# Patient Record
Sex: FEMALE | Race: WHITE | NOT HISPANIC OR LATINO | Employment: FULL TIME
[De-identification: names, ages, dates, MRNs, and addresses within clinical notes are randomized per-mention and may not be internally consistent; named-entity substitution may affect disease eponyms.]

---

## 2017-06-05 ENCOUNTER — RECORDS - HEALTHEAST (OUTPATIENT)
Dept: ADMINISTRATIVE | Facility: OTHER | Age: 22
End: 2017-06-05

## 2017-06-06 ENCOUNTER — COMMUNICATION - HEALTHEAST (OUTPATIENT)
Dept: SCHEDULING | Facility: CLINIC | Age: 22
End: 2017-06-06

## 2017-06-06 DIAGNOSIS — G43.909 MIGRAINE: ICD-10-CM

## 2017-06-09 ENCOUNTER — AMBULATORY - HEALTHEAST (OUTPATIENT)
Dept: FAMILY MEDICINE | Facility: CLINIC | Age: 22
End: 2017-06-09

## 2017-06-09 ENCOUNTER — COMMUNICATION - HEALTHEAST (OUTPATIENT)
Dept: INTERNAL MEDICINE | Facility: CLINIC | Age: 22
End: 2017-06-09

## 2017-06-09 ENCOUNTER — AMBULATORY - HEALTHEAST (OUTPATIENT)
Dept: NURSING | Facility: CLINIC | Age: 22
End: 2017-06-09

## 2017-06-09 ENCOUNTER — AMBULATORY - HEALTHEAST (OUTPATIENT)
Dept: INTERNAL MEDICINE | Facility: CLINIC | Age: 22
End: 2017-06-09

## 2017-06-09 DIAGNOSIS — Z23 IMMUNIZATION DUE: ICD-10-CM

## 2017-09-25 ENCOUNTER — OFFICE VISIT - HEALTHEAST (OUTPATIENT)
Dept: FAMILY MEDICINE | Facility: CLINIC | Age: 22
End: 2017-09-25

## 2017-09-25 DIAGNOSIS — R51.9 HEADACHE: ICD-10-CM

## 2017-09-25 DIAGNOSIS — R53.83 FATIGUE: ICD-10-CM

## 2017-09-25 LAB — HBA1C MFR BLD: 5.1 % (ref 3.5–6.1)

## 2017-09-25 RX ORDER — SUMATRIPTAN 100 MG/1
TABLET, FILM COATED ORAL
Qty: 10 TABLET | Refills: 0 | Status: SHIPPED | OUTPATIENT
Start: 2017-09-25 | End: 2022-03-14

## 2017-09-28 ENCOUNTER — COMMUNICATION - HEALTHEAST (OUTPATIENT)
Dept: FAMILY MEDICINE | Facility: CLINIC | Age: 22
End: 2017-09-28

## 2017-09-28 ENCOUNTER — AMBULATORY - HEALTHEAST (OUTPATIENT)
Dept: FAMILY MEDICINE | Facility: CLINIC | Age: 22
End: 2017-09-28

## 2017-09-28 DIAGNOSIS — E55.9 VITAMIN D DEFICIENCY: ICD-10-CM

## 2017-09-28 DIAGNOSIS — R79.0 LOW FERRITIN: ICD-10-CM

## 2017-11-13 ENCOUNTER — OFFICE VISIT - HEALTHEAST (OUTPATIENT)
Dept: FAMILY MEDICINE | Facility: CLINIC | Age: 22
End: 2017-11-13

## 2017-11-13 DIAGNOSIS — J02.9 SORE THROAT: ICD-10-CM

## 2017-11-21 ENCOUNTER — RECORDS - HEALTHEAST (OUTPATIENT)
Dept: ADMINISTRATIVE | Facility: OTHER | Age: 22
End: 2017-11-21

## 2020-02-25 ENCOUNTER — COMMUNICATION - HEALTHEAST (OUTPATIENT)
Dept: SCHEDULING | Facility: CLINIC | Age: 25
End: 2020-02-25

## 2020-02-26 ENCOUNTER — OFFICE VISIT - HEALTHEAST (OUTPATIENT)
Dept: FAMILY MEDICINE | Facility: CLINIC | Age: 25
End: 2020-02-26

## 2020-02-26 DIAGNOSIS — M54.50 ACUTE RIGHT-SIDED LOW BACK PAIN WITHOUT SCIATICA: ICD-10-CM

## 2020-02-26 RX ORDER — TRAMADOL HYDROCHLORIDE 50 MG/1
50 TABLET ORAL EVERY 6 HOURS PRN
Qty: 30 TABLET | Refills: 0 | Status: SHIPPED | OUTPATIENT
Start: 2020-02-26 | End: 2022-03-14

## 2020-02-26 RX ORDER — CYCLOBENZAPRINE HCL 10 MG
10 TABLET ORAL 3 TIMES DAILY PRN
Qty: 30 TABLET | Refills: 0 | Status: SHIPPED | OUTPATIENT
Start: 2020-02-26 | End: 2022-03-14

## 2020-08-14 ENCOUNTER — AMBULATORY - HEALTHEAST (OUTPATIENT)
Dept: FAMILY MEDICINE | Facility: CLINIC | Age: 25
End: 2020-08-14

## 2020-08-14 ENCOUNTER — VIRTUAL VISIT (OUTPATIENT)
Dept: FAMILY MEDICINE | Facility: OTHER | Age: 25
End: 2020-08-14

## 2020-08-14 DIAGNOSIS — Z20.822 SUSPECTED COVID-19 VIRUS INFECTION: ICD-10-CM

## 2020-08-14 NOTE — PROGRESS NOTES
"Date: 2020 10:16:31  Clinician: Tiana Castro  Clinician NPI: [id number]  Patient: Jennifer Gr  Patient : 1995  Patient Address: [address] [place], [place] [address]  Patient Phone: [phone]  Visit Protocol: URI  Patient Summary:  Jennifer is a 25 year old ( : 1995 ) female who initiated a Visit for COVID-19 (Coronavirus) evaluation and screening. When asked the question \"Please sign me up to receive news, health information and promotions. \", Jennifer responded \"No\".    Jennifer states her symptoms started gradually 3-4 days ago.   Her symptoms consist of a headache, a sore throat, a cough, nasal congestion, myalgia, anosmia, and malaise. Jennifer also feels feverish.   Symptom details     Nasal secretions: The color of her mucus is clear.    Cough: Jennifer coughs a few times an hour and her cough is not more bothersome at night. Phlegm comes into her throat when she coughs. She does not believe her cough is caused by post-nasal drip. The color of the phlegm is clear.     Sore throat: Jennifer reports having mild throat pain (1-3 on a 10 point pain scale), does not have exudate on her tonsils, and can swallow liquids. She is not sure if the lymph nodes in her neck are enlarged. A rash has not appeared on the skin since the sore throat started.     Temperature: Her current temperature is 99.4 degrees Fahrenheit.     Headache: She states the headache is mild (1-3 on a 10 point pain scale).      Jennifer denies having ear pain, chills, nausea, teeth pain, ageusia, diarrhea, vomiting, rhinitis, facial pain or pressure, and wheezing. She also denies having a sinus infection within the past year, taking antibiotic medication in the past month, double sickening (worsening symptoms after initial improvement), and having recent facial or sinus surgery in the past 60 days. She is not experiencing dyspnea.   Precipitating events  Within the past week, Jennifer has not been exposed to someone with strep " throat. She has recently been exposed to someone with influenza. Jennifer has not been in close contact with any high risk individuals.   Pertinent COVID-19 (Coronavirus) information  In the past 14 days, Jennifer has not worked in a congregate living setting.   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 She lives with a healthcare worker.   Jennifer has had a close contact with a laboratory-confirmed COVID-19 patient within 14 days of symptom onset.   Since December 2019, Jennifer and has not had upper respiratory infection or influenza-like illness. Has not been diagnosed with lab-confirmed COVID-19 test   Pertinent medical history  Jennifer does not get yeast infections when she takes antibiotics.   Jennifer does not need a return to work/school note.   Weight: 165 lbs   Jennifer does not smoke or use smokeless tobacco.   She denies pregnancy and denies breastfeeding. She has menstruated in the past month.   Weight: 165 lbs    MEDICATIONS: No current medications, ALLERGIES: NKDA  Clinician Response:  Dear Jennifer,   Your symptoms show that you may have coronavirus (COVID-19). This illness can cause fever, cough and trouble breathing. Many people get a mild case and get better on their own. Some people can get very sick.  What should I do?  We would like to test you for this virus.   1. Please call [phone] to schedule your visit. Explain that you were referred by [facility] to have a COVID-19 test. Be ready to share your [facility] visit ID number.  The following will serve as your written order for this COVID Test, ordered by me, for the indication of suspected COVID [Z20.828]: The test will be ordered in [employer], our electronic health record, after you are scheduled. It  "will show as ordered and authorized by Toy Gastelum MD.  Order: COVID-19 (Coronavirus) PCR for SYMPTOMATIC testing from [facility].    2. When it's time for your COVID test:  Stay at least 6 feet away from others. (If someone will drive you to your test, stay in the backseat, as far away from the  as you can.)   Cover your mouth and nose with a mask, tissue or washcloth.  Go straight to the testing site. Don't make any stops on the way there or back.    3.Starting now: Stay home and away from others (self-isolate) until:   You've had no fever---and no medicine that reduces fever---for one full day (24 hours). And...  Your other symptoms have gotten better. For example, your cough or breathing has improved. And...  At least 10 days have passed since your symptoms started.    During this time, don't leave the house except for testing or medical care.   Stay in your own room, even for meals. Use your own bathroom if you can.   Stay away from others in your home. No hugging, kissing or shaking hands. No visitors.  Don't go to work, school or anywhere else.    Clean \"high touch\" surfaces often (doorknobs, counters, handles, etc.). Use a household cleaning spray or wipes. You'll find a full list of  on the EPA website: www.epa.gov/pesticide-registration/list-n-disinfectants-use-against-sars-cov-2.   Cover your mouth and nose with a mask, tissue or washcloth to avoid spreading germs.  Wash your hands and face often. Use soap and water.  Caregivers in these groups are at risk for severe illness due to COVID-19:  o People 65 years and older  o People who live in a nursing home or long-term care facility  o People with chronic disease (lung, heart, cancer, diabetes, kidney, liver, immunologic)  o People who have a weakened immune system, including those who:   Are in cancer treatment  Take medicine that weakens the immune system, such as corticosteroids  Had a bone marrow or organ transplant  Have an immune " deficiency  Have poorly controlled HIV or AIDS  Are obese (body mass index of 40 or higher)  Smoke regularly   o Caregivers should wear gloves while washing dishes, handling laundry and cleaning bedrooms and bathrooms.  o Use caution when washing and drying laundry: Don't shake dirty laundry, and use the warmest water setting that you can.  o For more tips, go to www.cdc.gov/coronavirus/2019-ncov/downloads/10Things.pdf.   4.Sign up for [employer]. We know it's scary to hear that you might have COVID-19. We want to track your symptoms to make sure you're okay over the next 2 weeks. Please look for an email from [employer]---this is a free, online program that we'll use to keep in touch. To sign up, follow the link in the email. Learn more at [email or web address]  How can I take care of myself?   Get lots of rest. Drink extra fluids(unless a doctor has told you not to).  Take Tylenol (acetaminophen) for fever or pain. If you have liver or kidney problems, ask your family doctor if it's okay to take Tylenol.   Adults can take either:    650 mg (two 325 mg pills) every 4 to 6 hours, or...  1,000 mg (two 500 mg pills) every 8 hours as needed.   Note: Don't take more than 3,000 mg in one day. Acetaminophen is found in many medicines (both prescribed and over-the-counter medicines). Read all labels to be sure you don't take too much.   For children, check the Tylenol bottle for the right dose. The dose is based on the child's age or weight.    If you have other health problems (like cancer, heart failure, an organ transplant or severe kidney disease):Call your specialty clinic if you don't feel better in the next 2 days.    Know when to call 911. Emergency warning signs include:   Trouble breathing or shortness of breath Pain or pressure in the chest that doesn't go away Feeling confused like you haven't felt before, or not being able to wake up Bluish-colored lips or face.  Where can I get more  information?   [facility] -- About COVID-19: [email or web address]  CDC -- What to Do If You're Sick: www.cdc.gov/coronavirus/2019-ncov/about/steps-when-sick.html  [facility] -- Ending Home Isolation: www.cdc.gov/coronavirus/2019-ncov/hcp/disposition-in-home-patients.html  [facility] -- Caring for Someone: www.cdc.gov/coronavirus/2019-ncov/if-you-are-sick/care-for-someone.html  [facility] -- Interim Guidance for Hospital Discharge to Home: www.[facility].mn./diseases/coronavirus/hcp/hospdischarge.pdf  [facility] clinical trials (COVID-19 research studies): clinicalaffairs.[facility]-clinical-trials  Below are the COVID-19 hotlines at the [place] Department of Health ([facility]). Interpreters are available.    For health questions: Call 892-855-5818 or 1-761.774.4226 (7 a.m. to 7 p.m.) For questions about schools and childcare: Call 978-702-0452 or 1-672.861.9089 (7 a.m. to 7 p.m.)    Diagnosis: Cough  Diagnosis ICD: R05

## 2020-08-16 ENCOUNTER — COMMUNICATION - HEALTHEAST (OUTPATIENT)
Dept: SCHEDULING | Facility: CLINIC | Age: 25
End: 2020-08-16

## 2021-02-22 ENCOUNTER — OFFICE VISIT - HEALTHEAST (OUTPATIENT)
Dept: FAMILY MEDICINE | Facility: CLINIC | Age: 26
End: 2021-02-22

## 2021-02-22 DIAGNOSIS — Z30.019 ENCOUNTER FOR INITIAL PRESCRIPTION OF CONTRACEPTIVES, UNSPECIFIED CONTRACEPTIVE: ICD-10-CM

## 2021-03-04 ENCOUNTER — OFFICE VISIT - HEALTHEAST (OUTPATIENT)
Dept: FAMILY MEDICINE | Facility: CLINIC | Age: 26
End: 2021-03-04

## 2021-03-04 DIAGNOSIS — Z30.430 ENCOUNTER FOR IUD INSERTION: ICD-10-CM

## 2021-03-04 LAB — HCG UR QL: NEGATIVE

## 2021-03-04 RX ORDER — COPPER 313.4 MG/1
1 INTRAUTERINE DEVICE INTRAUTERINE ONCE
Status: SHIPPED | COMMUNITY
Start: 2021-03-04 | End: 2022-03-14

## 2021-05-31 VITALS — BODY MASS INDEX: 24.5 KG/M2 | WEIGHT: 168.3 LBS

## 2021-05-31 VITALS — BODY MASS INDEX: 25.11 KG/M2 | WEIGHT: 172.5 LBS

## 2021-06-04 VITALS
BODY MASS INDEX: 22.48 KG/M2 | WEIGHT: 154.44 LBS | DIASTOLIC BLOOD PRESSURE: 72 MMHG | SYSTOLIC BLOOD PRESSURE: 116 MMHG | HEART RATE: 64 BPM

## 2021-06-05 VITALS
WEIGHT: 171.5 LBS | BODY MASS INDEX: 24.96 KG/M2 | OXYGEN SATURATION: 100 % | HEART RATE: 82 BPM | SYSTOLIC BLOOD PRESSURE: 100 MMHG | DIASTOLIC BLOOD PRESSURE: 78 MMHG

## 2021-06-05 VITALS
SYSTOLIC BLOOD PRESSURE: 120 MMHG | OXYGEN SATURATION: 98 % | HEART RATE: 61 BPM | DIASTOLIC BLOOD PRESSURE: 80 MMHG | WEIGHT: 171.38 LBS | BODY MASS INDEX: 24.94 KG/M2

## 2021-06-06 NOTE — PROGRESS NOTES
ASSESSMENT/PLAN:  Acute right-sided low back pain without sciatica  This is a 25-year-old female who injured her right side lower back from playing broom ball 4 days ago.  Suspect her injury is musculoskeletal.  I will give her Flexeril and tramadol for management of her pain.  I asked that she continue with ice, heat, and modified weightbearing activities.  I encourage stretching when able.  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  Follow-up as needed.  Patient verbalized understanding and agreed with the plan  -     cyclobenzaprine (FLEXERIL) 10 MG tablet; Take 1 tablet (10 mg total) by mouth 3 (three) times a day as needed for muscle spasms.  -     traMADoL (ULTRAM) 50 mg tablet; Take 1 tablet (50 mg total) by mouth every 6 (six) hours as needed for pain.      CHIEF COMPLAINT:  Chief Complaint   Patient presents with     Back Pain     x 4 days due injury playing broom ball (ice hockey), right middle back     HISTORY OF PRESENT ILLNESS:  Violet is a 25 y.o. female presenting to the clinic today for upper right back pain. The patient was playing broom ball 4 days ago when she fell a few times and injured herself. She then went to work after playing broom ball. While at work, she reached for something and felt her back spasm and her pain spiked to a 9 out of 10. The pain is located in the upper lumbar region on the right side. The pain radiates up and down the back whenever she stands or sits. Today, the pain is at a 3 out of a 10 whenever she lays down. However, the pain will increase to a 6 out of a 10 whenever she walks or stands.  She has been icing and heating her back every 2 hours. She denies any hematuria. She does not have any history of chronic back pain.       REVIEW OF SYSTEMS:   Constitutional: Negative.   HENT: Negative.   Eyes: Negative.   Respiratory: Negative.   Cardiovascular: Negative.   Gastrointestinal: Negative.    Endocrine: Negative.   Genitourinary: Negative.   Musculoskeletal: Negative.   Skin: Negative.   Allergic/Immunologic: Negative.   Neurological: Negative.   Hematological: Negative.   Psychiatric/Behavioral: Negative.   All other systems are negative.    PFSH:  She works as a  at a restaurant.     TOBACCO USE:  Social History     Tobacco Use   Smoking Status Never Smoker   Smokeless Tobacco Never Used       VITALS:  Vitals:    02/26/20 0906   BP: 116/72   Patient Site: Left Arm   Patient Position: Sitting   Cuff Size: Adult Regular   Pulse: 64   Weight: 154 lb 7 oz (70.1 kg)     Wt Readings from Last 3 Encounters:   02/26/20 154 lb 7 oz (70.1 kg)   11/13/17 172 lb 8 oz (78.2 kg)   09/25/17 168 lb 4.8 oz (76.3 kg)       PHYSICAL EXAM:  Constitutional: Patient is oriented to person, place, and time. Patient appears well-developed and well-nourished. No distress.   Head: Normocephalic and atraumatic.   Right Ear: External ear normal.   Left Ear: External ear normal.   Nose: Nose normal.   Back: There is tenderness to palpation of the right thoracic area. There is no tenderness to palpation of the spinous or paraspinous process. There is no tenderness to percussion of the costal vertebral angle. There is no bruising, rash or deformity upon exam.   Neurological: Patient is alert and oriented to person, place, and time. Patient has normal reflexes. No cranial nerve deficit. Coordination normal.   Skin: Skin is warm and dry. No rash noted. Patient is not diaphoretic. No erythema. No pallor.    ADDITIONAL HISTORY SUMMARIZED (2): None.  DECISION TO OBTAIN EXTRA INFORMATION (1): None.   RADIOLOGY TESTS (1): None.  LABS (1): None.  MEDICINE TESTS (1): None.  INDEPENDENT REVIEW (2 each): None.     Amaris BARRIOS, am scribing for and in the presence of, Dr. Pittman.    IDr. Pittman, personally performed the services described in this documentation, as scribed by Amaris Irene in my presence, and it is both accurate  and complete.    MEDICATIONS:  Current Outpatient Medications   Medication Sig Dispense Refill     cyclobenzaprine (FLEXERIL) 10 MG tablet Take 1 tablet (10 mg total) by mouth 3 (three) times a day as needed for muscle spasms. 30 tablet 0     SUMAtriptan (IMITREX) 100 MG tablet Take 1 tablet orally once at the onset of headache, may repeat 1 dose after 2 hours. 10 tablet 0     traMADoL (ULTRAM) 50 mg tablet Take 1 tablet (50 mg total) by mouth every 6 (six) hours as needed for pain. 30 tablet 0     No current facility-administered medications for this visit.        Total data points:0

## 2021-06-06 NOTE — TELEPHONE ENCOUNTER
RN Assessment/Reason for Call:   Okay to leave Detailed Message  Jennifer calling in, appt Keith cevallos Sunday injured her back.  Using  ice and heat, tylenol and Motrin; helped some until spasms.  Once went down back of rt leg. Spasms thru her legs.    RN Action/Disposition:  Protocol recommends see Dr in 3 days.  Appt 9am [facility] Dr Pittman 2/26/20  Call back if worse symptoms  Discussed home care measures.  Agrees to plan.     Sandi Dye RN    [facility] Triage/med refill  2/25/2020  5:19 PM        Reason for Disposition    [1] After 3 days (72 hours) AND [2] back pain not improving    Protocols used: BACK INJURY-A-AH

## 2021-06-09 ENCOUNTER — RECORDS - HEALTHEAST (OUTPATIENT)
Dept: LAB | Facility: CLINIC | Age: 26
End: 2021-06-09

## 2021-06-09 LAB
ERYTHROCYTE [DISTWIDTH] IN BLOOD BY AUTOMATED COUNT: 12.3 % (ref 11–14.5)
HCT VFR BLD AUTO: 39.3 % (ref 35–47)
HGB BLD-MCNC: 12.7 G/DL (ref 12–16)
MCH RBC QN AUTO: 30 PG (ref 27–34)
MCHC RBC AUTO-ENTMCNC: 32.3 G/DL (ref 32–36)
MCV RBC AUTO: 93 FL (ref 80–100)
PLATELET # BLD AUTO: 291 THOU/UL (ref 140–440)
PMV BLD AUTO: 12.8 FL (ref 8.5–12.5)
PROGEST SERPL-MCNC: 8.4 NG/ML
RBC # BLD AUTO: 4.24 MILL/UL (ref 3.8–5.4)
TSH SERPL DL<=0.005 MIU/L-ACNC: 1.96 UIU/ML (ref 0.3–5)
WBC: 9.3 THOU/UL (ref 4–11)

## 2021-06-10 LAB — ESTRADIOL SERPL-MCNC: 98 PG/ML

## 2021-06-10 NOTE — TELEPHONE ENCOUNTER
"Coronavirus (COVID-19) Notification    Caller Name (Patient, parent, daughter/sone, grandparent, etc)  Patient     Reason for call  Notify of Positive Coronavirus (COVID-19) lab results, assess symptoms,  review [facility] recommendations    Lab Result    Lab test:  2019-nCoV rRt-PCR or SARS-CoV-2 PCR    Oropharyngeal AND/OR nasopharyngeal swabs is POSITIVE for 2019-nCoV RNA/SARS-COV-2 PCR (COVID-19 virus)    RN Recommendations/Instructions per [facility] Coronavirus COVID-19 recommendations    Brief introduction script  Introduce self and then review script:  \"I am calling on behalf of [employer].  We were notified that your Coronavirus test (COVID-19) for was POSITIVE for the virus.  I have some information to relay to you but first I wanted to mention that the [place] Dept of Health will be contacting you shortly [it's possible MD already called Patient] to talk to you more about how you are feeling and other people you have had contact with who might now also have the virus.  Also, [facility] is Partnering with the [facility] for Covid-19 research, you may be contacted directly by research staff.\"    ssessment (Inquire about Patient's current symptoms)   Assessment   Current Symptoms at time of phone call: (if no symptoms, document No symptoms]  cough,  Sore throat,  Headache, congested   Symptom onset (if applicable)  8/11/20     If at time of call, Patients symptoms hare worsened, the Patient should contact 911 or have someone drive them to Emergency Dept promptly:      If Patient calling 911, inform 911 personal that you have tested positive for the Coronavirus (COVID-19).  Place mask on and await 911 to arrive.    If Emergency Dept, If possible, please have another adult drive you to the Emergency Dept but you need to wear mask when in contact with other people.      Review information with Patient    Your result was positive. This means you have COVID-19 (coronavirus).  We have " sent you a letter that reviews the information that I'll be reviewing with you now.    How can I protect others?    If you have symptoms: stay home and away from others (self-isolate) until:    You've had no fever--and no medicine that reduces fever--for 3 full days (72 hours). And      Your other symptoms have gotten better. For example, your cough or breathing has improved. And     At least 10 days have passed since your symptoms started.    If you don't have symptoms: Stay home and away from others (self-isolate) until at least 10 days have passed since your first positive COVID-19 test. (Date test collected).    During this time:    Stay in your own room, including for meals. Use your own bathroom if you can.    Stay away from others in your home. No hugging, kissing or shaking hands. No visitors.     Don't go to work, school or anywhere else.     Clean  high touch  surfaces often (doorknobs, counters, handles, etc.). Use a household cleaning spray or wipes. You'll find a full list on the EPA website at www.epa.gov/pesticide-registration/list-n-disinfectants-use-against-sars-cov-2.     Cover your mouth and nose with a mask, tissue or washcloth to avoid spreading germs.    Wash your hands and face often with soap and water.    Caregivers in these groups are at risk for severe illness due to COVID-19:  o People 65 years and older  o People who live in a nursing home or long-term care facility  o People with chronic disease (lung, heart, cancer, diabetes, kidney, liver, immunologic)  o People who have a weakened immune system, including those who:  - Are in cancer treatment  - Take medicine that weakens the immune system, such as corticosteroids  - Had a bone marrow or organ transplant  - Have an immune deficiency  - Have poorly controlled HIV or AIDS  - Are obese (body mass index of 40 or higher)  - Smoke regularly    Caregivers should wear gloves while washing dishes, handling laundry and cleaning bedrooms and  bathrooms.    Wash and dry laundry with special caution. Don't shake dirty laundry, and use the warmest water setting you can.    If you have a weakened immune system, ask your doctor about other actions you should take.    For more tips, go to www.cdc.gov/coronavirus/2019-ncov/downloads/10Things.pdf.    You should not go back to work until you meet the guidelines above for ending your home isolation. You should meet these along with any other guidelines that your employer has.    Employers: This document serves as formal notice of your employee's medical guidelines for going back to work. They must meet the above guidelines before going back to work in person.    How can I take care of myself?    1. Get lots of rest. Drink extra fluids (unless a doctor has told you not to).    2. Take Tylenol (acetaminophen) for fever or pain. If you have liver or kidney problems, ask your family doctor if it's okay to take Tylenol.     Take either:     650 mg (two 325 mg pills) every 4 to 6 hours, or     1,000 mg (two 500 mg pills) every 8 hours as needed.     Note: Don't take more than 3,000 mg in one day. Acetaminophen is found in many medicines (both prescribed and over-the-counter medicines). Read all labels to be sure you don't take too much.    For children, check the Tylenol bottle for the right dose (based on their age or weight).    3. If you have other health problems (like cancer, heart failure, an organ transplant or severe kidney disease): Call your specialty clinic if you don't feel better in the next 2 days.    4. Know when to call 911: Emergency warning signs include:    Trouble breathing or shortness of breath    Pain or pressure in the chest that doesn't go away    Feeling confused like you haven't felt before, or not being able to wake up    Bluish-colored lips or face    5. Sign up for [employer]. We know it's scary to hear that you have COVID-19. We want to track your symptoms to make sure you're okay over  the next 2 weeks. Please look for an email from [employer]--this is a free, online program that we'll use to keep in touch. To sign up, follow the link in the email. Learn more at [email or web address].    Where can I get more information?    [facility]: [email or web address].[facility].[email or web address]/    Coronavirus Basics: www.health.[facility].[place]./diseases/coronavirus/basics.html    What to Do If You're Sick: www.cdc.gov/coronavirus/2019-ncov/about/steps-when-sick.html    Ending Home Isolation: www.cdc.gov/coronavirus/2019-ncov/hcp/disposition-in-home-patients.html     Caring for Someone with COVID-19: www.cdc.gov/coronavirus/2019-ncov/if-you-are-sick/care-for-someone.html     [facility] clinical trials (COVID-19 research studies): clinicalaffairs.[facility]-clinical-trials     A Positive COVID-19 letter will be sent via [employer] or the Mail    [Name]  Mark Mccauley RN  [facility] - [facility]  COVID19 Results Team RN  Ph# [phone]

## 2021-06-11 NOTE — PROGRESS NOTES
Pt has an appt with a nurse only today at [facility] asking for varicella shot, appt's note states no immune to varicella. Will call pt to bring result in.   Prior giving live vaccines. Pt needs pregnancy test. No birth control methods on file   Please advise/ place order for pregnancy test and  Varicella vaccine ASAP.       Baylee Oconnor, [facility] 6/9/2017 11:30 AM

## 2021-06-11 NOTE — PROGRESS NOTES
Chief Complaint   Patient presents with     Immunizations     Pt brought in varicella titer result.  Dr. Pena reviewed and states ok to give varicella injection today. Pregnancy test was negative   Pt states will get  2nd varicella in 4 weeks , per SHAYAN Latham in [place].  Varicella titer result is sent to med rec to scan.     Baylee Oconnor, [facility] 6/9/2017 2:53 PM

## 2021-06-13 NOTE — PROGRESS NOTES
Assessment/Plan:        Diagnoses and all orders for this visit:    Headache  -     Ambulatory referral to Neurology    Fatigue  -     Vitamin D, Total (25-Hydroxy)  -     Thyroid Stimulating Hormone (TSH)  -     Ferritin  -     HM2(CBC w/o Differential)  -     Comprehensive Metabolic Panel  -     Glycosylated Hemoglobin A1c    Other orders  -     SUMAtriptan (IMITREX) 100 MG tablet; Take 1 tablet orally once at the onset of headache, may repeat 1 dose after 2 hours.  Dispense: 10 tablet; Refill: 0        She is currently on 50 mg of Imitrex.  We will have her increase to 100 mg, not more than 200 mg total per day.  With her persistent and daily headache, numbness, will refer to [facility].  She is also to keep a headache diary to see if there is any particular pattern or triggers for her headaches.  For her fatigue, multifactorial.  We will do labs.  Await results prior to further recommendations.  For her menstrual cycle, closely monitor.  Could be affected by stressors or increase responsibilities, balancing work, life and school.  Recheck if worse.  She was agreeable with the plans.  Subjective:    Patient ID: Jennifer Gr is a 22 y.o. female.    TONY Rodriguez is here for follow-up from her last visit.  Has been having headaches persistently.  It is on a daily basis which is 2-5/10.  She has around 2 episodes in a month where it would be severe, 7-8/10.  Associated with dizziness, nausea, vomiting and numbness on the right lateral lip.  She takes Imitrex around 3 times a month.  If she is able to combine Imitrex with Naprosyn, Benadryl and sleep that it breaks the cycle.  Has had episodes where she had to repeat the Imitrex 2 hours after but felt miserable throughout the day.  She was started on Topamax but noted numbness in her hands and feet.  She also seems to be forgetting words.  She took it for 6-7 weeks and after that stopped it.  The numbness have improved.  Sometimes her daily headaches  would be worse mid day.  Tolerable during the morning.  Denies any syncopal events, vision changes.  She wears prescription glasses and the last eye exam was 6 months ago and was good.  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  She is able to function on a daily basis.    Wonders if her cycles are adding to her headache.  She would have her period every 26-37 days.  A few instances where she would have some brown spotting for a few days.  Sometimes her cycles are lighter.  She also feels tired for the past couple of months.  Wonders if this is work stress related.  Occasional lack of sleep.  There are times where she gets 8 hours of sleep but still feels tired even with less stressors or responsibilities for the day.  She is balancing work, school.  More requirements and need for focus.  She does function well with moderate amount of stress.    Review of Systems  As above otherwise negative.          Objective:    Physical Exam  /70 (Patient Site: Left Arm, Patient Position: Sitting, Cuff Size: Adult Large)  Pulse 65  Wt 168 lb 4.8 oz (76.3 kg)  LMP 09/07/2017 (Exact Date)  SpO2 98%  Breastfeeding? No  BMI 24.5 kg/m2    Vital signs noted above. AAO ×3.  HEENT no nasal discharge, moist oral mucosa.  Pupils 2-3 mm equally reactive to light.  Ears:TMs intact, no fluid collection. Neck: Supple neck, nonpalpable cervical lymph nodes.  Lungs: Clear to auscultation bilateral.  Heart: S1-S2 regular rate and rhythm, no murmurs were noted.  Abdomen:  with bowel sounds.

## 2021-06-14 NOTE — PROGRESS NOTES
Chief complaint: Sore throat and viral-like illness    HPI: The patient reports that Saturday, 3 days ago, she had body aches and fever.  She has been taking Tylenol and ibuprofen fairly regularly since that time has a fever will often precipitate a migraine.  She lives in a house with small children who have been ill.  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  She is also caring for a lot of classes at [employer] as she is studying public health.    She been ill with body aches over the weekend and yesterday was a little bit of a sore throat but today when she woke up the sore throat was worse and it seemed more swollen on the left than on the right.  She has not had vomiting or diarrhea or cough    Objective:/82 (Patient Site: Right Arm, Patient Position: Sitting, Cuff Size: Adult Regular)  Pulse 72  Wt 172 lb 8 oz (78.2 kg)  LMP 11/10/2017 (Approximate)  Breastfeeding? No  BMI 25.11 kg/m2  She is in no acute distress.  She is wearing glasses.  Her conjunctiva are clear and her TMs are pearly gray.  She does not have facial tenderness.  She has a little bit of erythema of her tonsils and the left one does appear to be slightly larger than the right and she has some mucus in the tonsillar crypts.  She has some anterior cervical adenopathy but it is not tender.  Lungs are clear to auscultation.  Recent Results (from the past 24 hour(s))   Rapid Strep A Screen-Throat   Result Value Ref Range    Rapid Strep A Antigen No Group A Strep detected, presumptive negative No Group A Strep detected, presumptive negative     Assessment: Viral illness with sore throat    Plan: It is possible that the strep throat culture will be positive even though the rapid test was negative.  I suggested salt water gargles and continued ibuprofen.  If the backup culture is positive, she will fill the prescription that I gave her today and printed out, for  amoxicillin.  If later in the week, despite a negative strep, she still is feeling as though she has a very red throat and sore swollen tonsils, she will fill the antibiotic prescription to treat tonsillitis.  She is comfortable with these parameters and will follow-up as needed

## 2021-06-15 NOTE — PROGRESS NOTES
IUD Insertion Procedure Note    Pre-operative Diagnosis: Contraception, paragard IUD Insertion    Post-operative Diagnosis: same    Indications: contraception    Procedure Details   Urine pregnancy test was done and result was negative.  The risks (including infection, bleeding, pain, and uterine perforation) and benefits of the procedure were explained to the patient and Written informed consent was obtained.      Cervix cleansed with Betadine. Uterus sounded to 8 cm. Paragard IUD inserted without difficulty. String visible and trimmed. Patient tolerated procedure well.    Condition:  Stable    Complications:  None    Plan:  The patient was advised to call for any fever or for prolonged or severe pain or bleeding. She was advised to use OTC analgesics as needed for mild to moderate pain.  She was advised to feel for the strings in 2 weeks or come in if she is unable to feel the strings.

## 2021-06-15 NOTE — PROGRESS NOTES
ASSESSMENT/PLAN:  Violet was seen today for iud consult.    Diagnoses and all orders for this visit:    Encounter for initial prescription of contraceptives, unspecified contraceptive  Spoke about various forms of birth control.  Also spoke about hormonal versus nonhormonal form of birth control.  Patient verbalized interest in the paragard IUD.  We spoke about the procedure, risks and benefit, and timing of insertion.  She will come back during her period week for insertion of paragard IUD.  Patient verbalized understanding and agree with plan    SUBJECTIVE:    Jennifer Gr is a 26 y.o. female who came in today     Interested in paragard IUD  Getting  end of June 2021  Was on progesterone only pills; got worsening HA  Menstrual cycles are 25-37 days interval  No heavy menstrual bleeding  Never been sexually active    Review of Systems (except those mentioned above)  Constitutional: Negative.   HENT: Negative.   Eyes: Negative.   Respiratory: Negative.   Cardiovascular: Negative.   Gastrointestinal: Negative.   Endocrine: Negative.   Genitourinary: Negative.   Musculoskeletal: Negative.   Skin: Negative.   Allergic/Immunologic: Negative.   Neurological: Negative.   Hematological: Negative.   Psychiatric/Behavioral: Negative.     There are no active problems to display for this patient.    No Known Allergies  Current Outpatient Medications   Medication Sig Dispense Refill     cyclobenzaprine (FLEXERIL) 10 MG tablet Take 1 tablet (10 mg total) by mouth 3 (three) times a day as needed for muscle spasms. 30 tablet 0     SUMAtriptan (IMITREX) 100 MG tablet Take 1 tablet orally once at the onset of headache, may repeat 1 dose after 2 hours. 10 tablet 0     traMADoL (ULTRAM) 50 mg tablet Take 1 tablet (50 mg total) by mouth every 6 (six) hours as needed for pain. 30 tablet 0     No current facility-administered medications for this visit.      No past medical history on file.  No past surgical history on  file.  Social History     Socioeconomic History     Marital status: Single     Spouse name: None     Number of children: None     Years of education: None     Highest education level: None   Occupational History     None   Social Needs     Financial resource strain: None     Food insecurity     Worry: None     Inability: None     Transportation needs     Medical: None     Non-medical: None   Tobacco Use     Smoking status: Never Smoker     Smokeless tobacco: Never Used   Substance and Sexual Activity     Alcohol use: None     Drug use: None     Sexual activity: None   Lifestyle     Physical activity     Days per week: None     Minutes per session: None     Stress: None   Relationships     Social connections     Talks on phone: None     Gets together: None     Attends [employer] service: None     Active member of club or organization: None     Attends meetings of clubs or organizations: None     Relationship status: None     Intimate partner violence     Fear of current or ex partner: None     Emotionally abused: None     Physically abused: None     Forced sexual activity: None   Other Topics Concern     None   Social History Narrative     None     No family history on file.      OBJECTIVE:    Vitals:    02/22/21 1036   BP: 120/80   Patient Site: Left Arm   Patient Position: Sitting   Cuff Size: Adult Regular   Pulse: 61   SpO2: 98%   Weight: 171 lb 6 oz (77.7 kg)     Body mass index is 24.94 kg/m .    Physical Exam:  Constitutional: Patient is oriented to person, place, and time. Patient appears well-developed and well-nourished. No distress.   Head: Normocephalic and atraumatic.   Right Ear: External ear normal.   Left Ear: External ear normal.   Eyes: Conjunctivae and EOM are normal. Right eye exhibits no discharge. Left eye exhibits no discharge. No scleral icterus.   Neurological: Patient is alert and oriented to person, place, and time. No cranial nerve deficit. Coordination normal.   Skin: No rash noted.  Patient is not diaphoretic. No erythema. No pallor.

## 2021-06-20 ENCOUNTER — HEALTH MAINTENANCE LETTER (OUTPATIENT)
Age: 26
End: 2021-06-20

## 2021-06-20 NOTE — LETTER
Letter by Oliver Taylor MD at      Author: Oliver Taylor MD Service: -- Author Type: --    Filed:  Encounter Date: 2/26/2020 Status: (Other)         February 26, 2020     Patient: Jennifer Gr   YOB: 1995   Date of Visit: 2/26/2020       To Whom it May Concern:    Jennifer Gr was seen in my clinic on 2/26/2020.  She injured her back and is experiencing significant pain and limitation in range of motion.  I recommend that she refrains from work effective today through March 2, 2020.  I recommend that she limits her weight bearing activities and lifting in the meantime.  She may return to work on March 3, 2020 with light duties for the first week.  Light duties include no lifting more than 5 pounds and to change position after prolonged standing or sitting for more than 60 minutes.    If you have any questions or concerns, please don't hesitate to call.    Sincerely,         Electronically signed by Oliver Alejo MD

## 2021-10-04 ENCOUNTER — LAB REQUISITION (OUTPATIENT)
Dept: LAB | Facility: CLINIC | Age: 26
End: 2021-10-04

## 2021-10-04 DIAGNOSIS — Z01.84 ENCOUNTER FOR ANTIBODY RESPONSE EXAMINATION: ICD-10-CM

## 2021-10-04 PROCEDURE — 86769 SARS-COV-2 COVID-19 ANTIBODY: CPT | Mod: ORL | Performed by: MIDWIFE

## 2021-10-06 LAB
SARS-COV-2 AB SERPL IA-ACNC: >250 U/ML
SARS-COV-2 AB SERPL-IMP: POSITIVE

## 2021-10-11 ENCOUNTER — HEALTH MAINTENANCE LETTER (OUTPATIENT)
Age: 26
End: 2021-10-11

## 2021-11-18 ENCOUNTER — LAB REQUISITION (OUTPATIENT)
Dept: LAB | Facility: CLINIC | Age: 26
End: 2021-11-18

## 2021-11-18 PROCEDURE — 86769 SARS-COV-2 COVID-19 ANTIBODY: CPT | Mod: ORL | Performed by: MIDWIFE

## 2021-11-22 LAB
SARS-COV-2 AB SERPL IA-ACNC: >250 U/ML
SARS-COV-2 AB SERPL-IMP: POSITIVE

## 2021-12-08 ENCOUNTER — OFFICE VISIT (OUTPATIENT)
Dept: URGENT CARE | Facility: URGENT CARE | Age: 26
End: 2021-12-08

## 2021-12-08 VITALS
HEART RATE: 104 BPM | DIASTOLIC BLOOD PRESSURE: 89 MMHG | OXYGEN SATURATION: 99 % | WEIGHT: 171 LBS | SYSTOLIC BLOOD PRESSURE: 138 MMHG | TEMPERATURE: 98.7 F | BODY MASS INDEX: 24.89 KG/M2

## 2021-12-08 DIAGNOSIS — M62.830 BACK MUSCLE SPASM: Primary | ICD-10-CM

## 2021-12-08 PROCEDURE — 99213 OFFICE O/P EST LOW 20 MIN: CPT | Performed by: NURSE PRACTITIONER

## 2021-12-08 RX ORDER — METHOCARBAMOL 750 MG/1
750 TABLET, FILM COATED ORAL 3 TIMES DAILY
Qty: 21 TABLET | Refills: 0 | Status: SHIPPED | OUTPATIENT
Start: 2021-12-08 | End: 2021-12-15

## 2021-12-08 NOTE — PROGRESS NOTES
Chief Complaint   Patient presents with     Urgent Care     Back Pain     c/o back pain after a fall yesterday     SUBJECTIVE:  Jennifer Gr is a 26 year old female who presents to the clinic today with left scapular back muscle spasm for 1 day. She slipped on ice yesterday and landed on her buttocks. Felt fine until this morning, was putting on jeans, and had severe pain, tightness, spasms. It hurts over her left scapula and somewhat towards her c spine. She declines numbness, tingling, weakness. Had had back injuries in the past, wonders if there is some disc narrowing, feels like this is more muscular. Tried ibuprofen.    No past medical history on file.  copper (PARAGARD T 380A) 380 square mm IUD IUD, [COPPER (PARAGARD T 380A) 380 SQUARE MM IUD IUD] 1 each by Intrauterine route once. Inserted by Dr. Jovita Ptitman on 03/04/2021  cyclobenzaprine (FLEXERIL) 10 MG tablet, [CYCLOBENZAPRINE (FLEXERIL) 10 MG TABLET] Take 1 tablet (10 mg total) by mouth 3 (three) times a day as needed for muscle spasms.  SUMAtriptan (IMITREX) 100 MG tablet, [SUMATRIPTAN (IMITREX) 100 MG TABLET] Take 1 tablet orally once at the onset of headache, may repeat 1 dose after 2 hours.  traMADoL (ULTRAM) 50 mg tablet, [TRAMADOL (ULTRAM) 50 MG TABLET] Take 1 tablet (50 mg total) by mouth every 6 (six) hours as needed for pain.    No current facility-administered medications on file prior to visit.    Social History     Tobacco Use     Smoking status: Never Smoker     Smokeless tobacco: Never Used   Substance Use Topics     Alcohol use: Not on file     No Known Allergies    Review of Systems   All systems negative except for those listed above in HPI.    EXAM:   /89   Pulse 104   Temp 98.7  F (37.1  C) (Tympanic)   Wt 77.6 kg (171 lb)   LMP 11/24/2021   SpO2 99%   BMI 24.89 kg/m      Physical Exam  Vitals reviewed.   Constitutional:       General: She is in acute distress (tearful).      Appearance: Normal appearance.   HENT:       Head: Normocephalic and atraumatic.   Cardiovascular:      Rate and Rhythm: Normal rate.   Pulmonary:      Effort: Pulmonary effort is normal.   Musculoskeletal:         General: Tenderness and signs of injury present. No swelling or deformity. Normal range of motion.      Right lower leg: No edema.      Left lower leg: No edema.   Skin:     General: Skin is warm and dry.      Findings: No rash.   Neurological:      General: No focal deficit present.      Mental Status: She is alert and oriented to person, place, and time.   Psychiatric:         Mood and Affect: Mood normal.         Behavior: Behavior normal.       ASSESSMENT:    ICD-10-CM    1. Back muscle spasm  M62.830 methocarbamol (ROBAXIN) 750 MG tablet     PLAN:    Likely cervical muscle strain with spasm  Robaxin  Rotate tylenol and ibuprofen  Heat, massage, stretching, topicals  Shared decision making to hold on imaging at this time as she did not fall onto her c spine and is neurovascularly intact    Patient Instructions     Patient Education     Back Spasm (No Trauma)    Spasm of the back muscles can occur after a sudden forceful twisting or bending such as in a car accident. A spasm can also happen after a simple awkward movement, or after lifting something heavy with poor body positioning. In any case, muscle spasm adds to the pain. Sleeping in an awkward position or on a poor quality mattress can also cause this. Some people respond to emotional stress by tensing the muscles of their back.  Pain that continues may need further assessment or other types of treatment such as physical therapy.  You don't always need X-rays for the first assessment of back pain, unless you had a physical injury such as from a car accident or fall. If your pain continues and doesn't respond to medical treatment, X-rays and other tests may then be done.   Home care    As soon as possible, start sitting or walking again. This will help prevent problems from a long bed rest.  These problems include muscle weakness, worsening back stiffness and pain, and blood clots in the legs.    When in bed, try to find a position of comfort. A firm mattress is best. Try lying flat on your back with pillows under your knees. You can also try lying on your side with your knees bent up toward your chest and a pillow between your knees.    Don't sit for long periods. Also limit car rides and travel. This puts more stress on the lower back than standing or walking.     During the first 24 to 72 hours after an injury or flare-up, put an ice pack on the painful area for 20 minutes, then remove it for 20 minutes. Do this over a period of 60 to 90 minutes, or several times a day. This will reduce swelling and pain. Always wrap ice packs in a thin towel.    You can start with ice, then switch to heat. Heat from a hot shower, hot bath, or heating pad reduces pain and works well for muscle spasms. Put heat on the painful area for 20 minutes, then remove it for 20 minutes. Do this over a period of 60 to 90 minutes, or several times a day. Don't sleep on a heating pad. It can burn or damage skin.    Alternate using ice and heat.    Be aware of safe lifting methods. don't lift anything over 15 pounds until all the pain is gone.  Gentle stretching will help your back heal faster. Do this simple routine 2 to 3 times a day until your back is feeling better.    Lie on your back with your knees bent and both feet on the ground.    Slowly raise your left knee to your chest as you flatten your lower back against the floor. Hold for 20 to 30 seconds.    Relax and repeat the exercise with your right knee.    Do 2 to 3 of these exercises for each leg.    Repeat, hugging both knees to your chest at the same time.    Don't bounce, but use a gentle pull.  Medicines  Talk with your doctor before using medicine, especially if you have other medical problems or are taking other medicines.  You may use over-the-counter medicines  such as acetaminophen, ibuprofen, or naprosyn to control pain, unless your healthcare provider prescribed another pain medicine. Talk with your healthcare provider if you have a chronic condition such as diabetes, liver or kidney disease, stomach ulcer, or digestive bleeding, or are taking blood thinners.  Be careful if you are given prescription pain medicine, opioids, or medicine for muscle spasm. They can cause drowsiness, and affect your coordination, reflexes, and judgment. Don't drive or operate heavy machinery when taking these medicines. Take pain medicine only as prescribed by your healthcare provider.  Follow-up care  Follow up with your doctor, or as advised. You may need physical therapy or more tests.  If X-rays were taken, they may be reviewed by a radiologist. You will be told of any new findings that may affect your care.  Call   Call if any of these occur:    Trouble breathing    Confusion    Drowsiness or trouble awakening    Fainting or loss of consciousness    Rapid or very slow heart rate    Loss of bowel or bladder control  When to seek medical advice  Call your healthcare provider right away if any of these occur:    Pain becomes worse or spreads to your legs    Weakness or numbness in one or both legs    Numbness in the groin or genital area    Fever of 100.4 F (38 C) or higher , or as directed by your healthcare provider    Chills    Burning or pain when passing urine  [employer] last reviewed this educational content on 11/1/2018 2000-2021 [employer]. All rights reserved. This information is not intended as a substitute for professional medical care. Always follow your healthcare professional's instructions.             Follow up with primary care provider with any problems, questions or concerns or if symptoms worsen or fail to improve. Patient agreed to plan and verbalized understanding.    [facility]

## 2021-12-08 NOTE — PATIENT INSTRUCTIONS
Patient Education     Back Spasm (No Trauma)    Spasm of the back muscles can occur after a sudden forceful twisting or bending such as in a car accident. A spasm can also happen after a simple awkward movement, or after lifting something heavy with poor body positioning. In any case, muscle spasm adds to the pain. Sleeping in an awkward position or on a poor quality mattress can also cause this. Some people respond to emotional stress by tensing the muscles of their back.  Pain that continues may need further assessment or other types of treatment such as physical therapy.  You don't always need X-rays for the first assessment of back pain, unless you had a physical injury such as from a car accident or fall. If your pain continues and doesn't respond to medical treatment, X-rays and other tests may then be done.   Home care    As soon as possible, start sitting or walking again. This will help prevent problems from a long bed rest. These problems include muscle weakness, worsening back stiffness and pain, and blood clots in the legs.    When in bed, try to find a position of comfort. A firm mattress is best. Try lying flat on your back with pillows under your knees. You can also try lying on your side with your knees bent up toward your chest and a pillow between your knees.    Don't sit for long periods. Also limit car rides and travel. This puts more stress on the lower back than standing or walking.     During the first 24 to 72 hours after an injury or flare-up, put an ice pack on the painful area for 20 minutes, then remove it for 20 minutes. Do this over a period of 60 to 90 minutes, or several times a day. This will reduce swelling and pain. Always wrap ice packs in a thin towel.    You can start with ice, then switch to heat. Heat from a hot shower, hot bath, or heating pad reduces pain and works well for muscle spasms. Put heat on the painful area for 20 minutes, then remove it for 20 minutes. Do this  over a period of 60 to 90 minutes, or several times a day. Don't sleep on a heating pad. It can burn or damage skin.    Alternate using ice and heat.    Be aware of safe lifting methods. don't lift anything over 15 pounds until all the pain is gone.  Gentle stretching will help your back heal faster. Do this simple routine 2 to 3 times a day until your back is feeling better.    Lie on your back with your knees bent and both feet on the ground.    Slowly raise your left knee to your chest as you flatten your lower back against the floor. Hold for 20 to 30 seconds.    Relax and repeat the exercise with your right knee.    Do 2 to 3 of these exercises for each leg.    Repeat, hugging both knees to your chest at the same time.    Don't bounce, but use a gentle pull.  Medicines  Talk with your doctor before using medicine, especially if you have other medical problems or are taking other medicines.  You may use over-the-counter medicines such as acetaminophen, ibuprofen, or naprosyn to control pain, unless your healthcare provider prescribed another pain medicine. Talk with your healthcare provider if you have a chronic condition such as diabetes, liver or kidney disease, stomach ulcer, or digestive bleeding, or are taking blood thinners.  Be careful if you are given prescription pain medicine, opioids, or medicine for muscle spasm. They can cause drowsiness, and affect your coordination, reflexes, and judgment. Don't drive or operate heavy machinery when taking these medicines. Take pain medicine only as prescribed by your healthcare provider.  Follow-up care  Follow up with your doctor, or as advised. You may need physical therapy or more tests.  If X-rays were taken, they may be reviewed by a radiologist. You will be told of any new findings that may affect your care.  Call   Call if any of these occur:    Trouble breathing    Confusion    Drowsiness or trouble awakening    Fainting or loss of consciousness    Rapid  or very slow heart rate    Loss of bowel or bladder control  When to seek medical advice  Call your healthcare provider right away if any of these occur:    Pain becomes worse or spreads to your legs    Weakness or numbness in one or both legs    Numbness in the groin or genital area    Fever of 100.4 F (38 C) or higher , or as directed by your healthcare provider    Chills    Burning or pain when passing urine  [employer] last reviewed this educational content on 11/1/2018 2000-2021 [employer]. All rights reserved. This information is not intended as a substitute for professional medical care. Always follow your healthcare professional's instructions.

## 2022-03-14 ENCOUNTER — OFFICE VISIT (OUTPATIENT)
Dept: FAMILY MEDICINE | Facility: CLINIC | Age: 27
End: 2022-03-14

## 2022-03-14 VITALS
BODY MASS INDEX: 29.51 KG/M2 | OXYGEN SATURATION: 98 % | HEART RATE: 81 BPM | WEIGHT: 199.25 LBS | SYSTOLIC BLOOD PRESSURE: 123 MMHG | TEMPERATURE: 98.3 F | DIASTOLIC BLOOD PRESSURE: 84 MMHG | HEIGHT: 69 IN

## 2022-03-14 DIAGNOSIS — M54.41 ACUTE BILATERAL LOW BACK PAIN WITH RIGHT-SIDED SCIATICA: Primary | ICD-10-CM

## 2022-03-14 DIAGNOSIS — Z11.4 SCREENING FOR HIV (HUMAN IMMUNODEFICIENCY VIRUS): ICD-10-CM

## 2022-03-14 DIAGNOSIS — Z11.59 NEED FOR HEPATITIS C SCREENING TEST: ICD-10-CM

## 2022-03-14 DIAGNOSIS — Z12.4 CERVICAL CANCER SCREENING: ICD-10-CM

## 2022-03-14 PROCEDURE — 99214 OFFICE O/P EST MOD 30 MIN: CPT | Performed by: FAMILY MEDICINE

## 2022-03-14 ASSESSMENT — ENCOUNTER SYMPTOMS: BACK PAIN: 1

## 2022-03-14 NOTE — PROGRESS NOTES
"Assessment & Plan    1. Acute bilateral low back pain with right-sided sciatica  This is a 28 yo female with acute on chronic low back pain - midline, but with right sided sciatica.  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  There are no neurologic red flags.  Xray of the lower spine is obtained and reviewed together.  I don't see any worrisome findings.  I would recommend PT and/or chiropractic intervention for active therapy/core strengthening.  Discussed warning signs/symptoms that should be evaluated more urgently.   - XR Lumbar Spine 2/3 Views; Future    2. Screening for HIV (human immunodeficiency virus)  3. Need for hepatitis C screening test  Reviewed HM - has \"health maintenance\" done at her place of employment    4. Cervical cancer screening  Reports normal pap smear at her place of employment - patient works as a midwife      Patient also notes a \"lump\" in left cheek - reports +TMJ.  Exam is unremarkable - reassured.      Return in about 4 weeks (around 4/11/2022) for if not getting better.    Chief Complaint   Patient presents with     Back Pain     lower left side-     Derm Problem      HPI  Lower back pain x months   Has \"spot\" where pain is x 1 month   Pain is not localized just to the lump  Lump isn't getting bigger  Hasn't seen someone for pain in past -   Is a midwife -   No injury to back  Slipped on ice a couple months ago - more upper back pain     Some pain on outside of right hip to side of legs -   Lump is on left leg     Had COVID August 2020  Still monitors antibodies - \"I'm really high\"    Patient adopted -   No known family history     Gained weight in last few months (30 pounds in last 10-11 months - got , happy, sitting still on the couch more) - now, starting to exercise a little more, snacking has been terrible;    Lump in left cheek x years -   No change   Has TMJ as well      Back Pain     History " of Present Illness       Back Pain:  She presents for follow up of back pain. Patient's back pain is a chronic problem.  Location of back pain:  Right lower back, left lower back and left buttock  Description of back pain: dull ache, sharp and shooting  Back pain spreads: right thigh    Since patient first noticed back pain, pain is: always present, but gets better and worse  Does back pain interfere with her job:  Yes      Reason for visit:  Low back pain with a cyst or bump low L side and L facial cheek  Symptom onset:  More than a month  Symptoms include:  Low pack una especially with bending and moving sitting to standing  Symptom intensity:  Moderate  Symptom progression:  Staying the same  Had these symptoms before:  No  What makes it worse:  Lots of bending of moving from sitting to standing  What makes it better:  Heat, NSAIDs, stretching, rest    She eats 4 or more servings of fruits and vegetables daily.She consumes 1 sweetened beverage(s) daily.She exercises with enough effort to increase her heart rate 9 or less minutes per day.  She exercises with enough effort to increase her heart rate 3 or less days per week.   She is taking medications regularly.       There is no problem list on file for this patient.       No past medical history on file.     No current outpatient medications on file.     No current facility-administered medications for this visit.        No past surgical history on file.     Social History     Socioeconomic History     Marital status:      Spouse name: Not on file     Number of children: Not on file     Years of education: Not on file     Highest education level: Not on file   Occupational History     Not on file   Tobacco Use     Smoking status: Never Smoker     Smokeless tobacco: Never Used   Substance and Sexual Activity     Alcohol use: Not on file     Drug use: Not on file     Sexual activity: Not on file   Other Topics Concern     Not on file   Social History  "Narrative     Not on file     Social Determinants of Health     Financial Resource Strain: Not on file   Food Insecurity: Not on file   Transportation Needs: Not on file   Physical Activity: Not on file   Stress: Not on file   Social Connections: Not on file   Intimate Partner Violence: Not on file   Housing Stability: Not on file        No family history on file.     Review of Systems   HENT:        Positive TMJ, L>R     Musculoskeletal: Positive for back pain.   Skin:        Lump in left cheek (unchanged x years per patient)_   All other systems reviewed and are negative.       /84   Pulse 81   Temp 98.3  F (36.8  C)   Ht 1.753 m (5' 9\")   Wt 90.4 kg (199 lb 4 oz)   LMP 03/02/2022   SpO2 98%   BMI 29.42 kg/m       Physical Exam  Constitutional:       General: She is not in acute distress.     Appearance: She is well-developed.   HENT:      Right Ear: Tympanic membrane and external ear normal.      Left Ear: Tympanic membrane and external ear normal.      Nose: Nose normal.      Mouth/Throat:      Pharynx: No oropharyngeal exudate.   Eyes:      General:         Right eye: No discharge.         Left eye: No discharge.      Conjunctiva/sclera: Conjunctivae normal.      Pupils: Pupils are equal, round, and reactive to light.   Neck:      Thyroid: No thyromegaly.      Trachea: No tracheal deviation.   Cardiovascular:      Rate and Rhythm: Normal rate and regular rhythm.      Pulses: Normal pulses.      Heart sounds: Normal heart sounds, S1 normal and S2 normal. No murmur heard.    No friction rub. No S3 or S4 sounds.   Pulmonary:      Effort: Pulmonary effort is normal. No respiratory distress.      Breath sounds: Normal breath sounds. No wheezing or rales.   Abdominal:      General: Bowel sounds are normal.      Palpations: Abdomen is soft. There is no mass.      Tenderness: There is no abdominal tenderness.   Musculoskeletal:         General: Normal range of motion.      Cervical back: Neck supple.      " Comments: Neg SLR   Back supple, spine straight   Lymphadenopathy:      Cervical: No cervical adenopathy.   Skin:     General: Skin is warm and dry.      Findings: No rash.   Neurological:      General: No focal deficit present.      Mental Status: She is alert and oriented to person, place, and time.      Cranial Nerves: No cranial nerve deficit.      Sensory: No sensory deficit.      Motor: No weakness or abnormal muscle tone.      Coordination: Coordination normal.      Gait: Gait normal.      Deep Tendon Reflexes: Reflexes are normal and symmetric. Reflexes normal.   Psychiatric:         Thought Content: Thought content normal.         Judgment: Judgment normal.          Results:  Results for orders placed or performed in visit on 03/14/22   XR Lumbar Spine 2/3 Views     Status: None    Narrative    EXAM: XR LUMBAR SPINE 2-3 VIEWS  LOCATION: [facility]  DATE/TIME: 3/14/2022 9:04 AM    INDICATION:  Acute bilateral low back pain with right-sided sciatica.  COMPARISON: None.  TECHNIQUE: CR Lumbar Spine.      Impression    IMPRESSION: There are 5 lumbar type vertebral bodies. Mild levocurvature centered at L3 with otherwise normal alignment. Normal vertebral body heights without compression fracture. Normal disc spaces. Normal posterior elements. The sacrum and visualized   pelvis are unremarkable.       Medications at Conclusion of Visit:  No current outpatient medications on file.         ANTHONY FLEMING MD

## 2022-04-04 ENCOUNTER — TRANSFERRED RECORDS (OUTPATIENT)
Dept: HEALTH INFORMATION MANAGEMENT | Facility: CLINIC | Age: 27
End: 2022-04-04

## 2022-04-04 ENCOUNTER — LAB REQUISITION (OUTPATIENT)
Dept: LAB | Facility: CLINIC | Age: 27
End: 2022-04-04
Payer: COMMERCIAL

## 2022-04-04 DIAGNOSIS — Z34.01 ENCOUNTER FOR SUPERVISION OF NORMAL FIRST PREGNANCY, FIRST TRIMESTER: ICD-10-CM

## 2022-04-04 DIAGNOSIS — Z36.0 ENCOUNTER FOR ANTENATAL SCREENING FOR CHROMOSOMAL ANOMALIES: ICD-10-CM

## 2022-04-04 LAB
HCG SERPL QL: POSITIVE
HCG SERPL-ACNC: 975 MLU/ML (ref 0–4)
PROGEST SERPL-MCNC: 15.5 NG/ML

## 2022-04-04 PROCEDURE — 84702 CHORIONIC GONADOTROPIN TEST: CPT | Performed by: MIDWIFE

## 2022-04-04 PROCEDURE — 36415 COLL VENOUS BLD VENIPUNCTURE: CPT | Performed by: MIDWIFE

## 2022-04-04 PROCEDURE — 84144 ASSAY OF PROGESTERONE: CPT | Performed by: MIDWIFE

## 2022-04-04 PROCEDURE — 84703 CHORIONIC GONADOTROPIN ASSAY: CPT | Mod: ORL | Performed by: MIDWIFE

## 2022-04-04 PROCEDURE — P9603 ONE-WAY ALLOW PRORATED MILES: HCPCS | Mod: ORL | Performed by: MIDWIFE

## 2022-04-04 PROCEDURE — P9603 ONE-WAY ALLOW PRORATED MILES: HCPCS | Performed by: MIDWIFE

## 2022-04-04 PROCEDURE — 84702 CHORIONIC GONADOTROPIN TEST: CPT | Mod: ORL | Performed by: MIDWIFE

## 2022-04-04 PROCEDURE — 84144 ASSAY OF PROGESTERONE: CPT | Mod: ORL | Performed by: MIDWIFE

## 2022-04-04 PROCEDURE — 84703 CHORIONIC GONADOTROPIN ASSAY: CPT | Performed by: MIDWIFE

## 2022-04-05 ENCOUNTER — TRANSFERRED RECORDS (OUTPATIENT)
Dept: HEALTH INFORMATION MANAGEMENT | Facility: CLINIC | Age: 27
End: 2022-04-05

## 2022-04-05 ENCOUNTER — LAB REQUISITION (OUTPATIENT)
Dept: LAB | Facility: CLINIC | Age: 27
End: 2022-04-05
Payer: COMMERCIAL

## 2022-04-05 DIAGNOSIS — Z36.0 ENCOUNTER FOR ANTENATAL SCREENING FOR CHROMOSOMAL ANOMALIES: ICD-10-CM

## 2022-04-05 DIAGNOSIS — Z34.01 ENCOUNTER FOR SUPERVISION OF NORMAL FIRST PREGNANCY, FIRST TRIMESTER: ICD-10-CM

## 2022-04-05 LAB
HCG SERPL QL: POSITIVE
HCG SERPL-ACNC: 1570 MLU/ML (ref 0–4)

## 2022-04-05 PROCEDURE — 84702 CHORIONIC GONADOTROPIN TEST: CPT | Performed by: MIDWIFE

## 2022-04-05 PROCEDURE — 84702 CHORIONIC GONADOTROPIN TEST: CPT | Mod: ORL | Performed by: MIDWIFE

## 2022-04-05 PROCEDURE — 84703 CHORIONIC GONADOTROPIN ASSAY: CPT | Performed by: MIDWIFE

## 2022-04-05 PROCEDURE — 84703 CHORIONIC GONADOTROPIN ASSAY: CPT | Mod: ORL | Performed by: MIDWIFE

## 2022-04-11 ENCOUNTER — LAB REQUISITION (OUTPATIENT)
Dept: LAB | Facility: CLINIC | Age: 27
End: 2022-04-11
Payer: COMMERCIAL

## 2022-04-11 ENCOUNTER — LAB REQUISITION (OUTPATIENT)
Dept: LAB | Facility: CLINIC | Age: 27
End: 2022-04-11

## 2022-04-11 ENCOUNTER — TRANSFERRED RECORDS (OUTPATIENT)
Dept: HEALTH INFORMATION MANAGEMENT | Facility: CLINIC | Age: 27
End: 2022-04-11

## 2022-04-11 DIAGNOSIS — Z34.01 ENCOUNTER FOR SUPERVISION OF NORMAL FIRST PREGNANCY, FIRST TRIMESTER: ICD-10-CM

## 2022-04-11 DIAGNOSIS — Z36.0 ENCOUNTER FOR ANTENATAL SCREENING FOR CHROMOSOMAL ANOMALIES: ICD-10-CM

## 2022-04-11 LAB
HCG SERPL QL: POSITIVE
HCG SERPL-ACNC: ABNORMAL MLU/ML (ref 0–4)
PROGEST SERPL-MCNC: 14.7 NG/ML

## 2022-04-11 PROCEDURE — 84702 CHORIONIC GONADOTROPIN TEST: CPT | Performed by: MIDWIFE

## 2022-04-11 PROCEDURE — 84144 ASSAY OF PROGESTERONE: CPT | Mod: ORL | Performed by: MIDWIFE

## 2022-04-11 PROCEDURE — 84703 CHORIONIC GONADOTROPIN ASSAY: CPT | Mod: ORL | Performed by: MIDWIFE

## 2022-04-11 PROCEDURE — 84144 ASSAY OF PROGESTERONE: CPT | Performed by: MIDWIFE

## 2022-04-11 PROCEDURE — 84703 CHORIONIC GONADOTROPIN ASSAY: CPT | Performed by: MIDWIFE

## 2022-04-11 PROCEDURE — 84702 CHORIONIC GONADOTROPIN TEST: CPT | Mod: ORL | Performed by: MIDWIFE

## 2022-04-15 ENCOUNTER — TRANSFERRED RECORDS (OUTPATIENT)
Dept: HEALTH INFORMATION MANAGEMENT | Facility: CLINIC | Age: 27
End: 2022-04-15

## 2022-04-18 ENCOUNTER — TRANSFERRED RECORDS (OUTPATIENT)
Dept: HEALTH INFORMATION MANAGEMENT | Facility: CLINIC | Age: 27
End: 2022-04-18

## 2022-04-18 PROCEDURE — 84144 ASSAY OF PROGESTERONE: CPT | Mod: ORL | Performed by: MIDWIFE

## 2022-04-18 PROCEDURE — 84702 CHORIONIC GONADOTROPIN TEST: CPT | Mod: ORL | Performed by: MIDWIFE

## 2022-04-18 PROCEDURE — 84702 CHORIONIC GONADOTROPIN TEST: CPT | Performed by: MIDWIFE

## 2022-04-18 PROCEDURE — 84144 ASSAY OF PROGESTERONE: CPT | Performed by: MIDWIFE

## 2022-04-19 ENCOUNTER — LAB REQUISITION (OUTPATIENT)
Dept: LAB | Facility: CLINIC | Age: 27
End: 2022-04-19
Payer: COMMERCIAL

## 2022-04-19 DIAGNOSIS — Z36.0 ENCOUNTER FOR ANTENATAL SCREENING FOR CHROMOSOMAL ANOMALIES: ICD-10-CM

## 2022-04-19 DIAGNOSIS — Z34.01 ENCOUNTER FOR SUPERVISION OF NORMAL FIRST PREGNANCY, FIRST TRIMESTER: ICD-10-CM

## 2022-04-19 LAB
B-HCG SERPL-ACNC: ABNORMAL IU/L (ref 0–5)
PROGEST SERPL-MCNC: 34.9 NG/ML

## 2022-05-03 ENCOUNTER — TRANSFERRED RECORDS (OUTPATIENT)
Dept: HEALTH INFORMATION MANAGEMENT | Facility: CLINIC | Age: 27
End: 2022-05-03

## 2022-05-03 PROCEDURE — 84144 ASSAY OF PROGESTERONE: CPT | Mod: ORL | Performed by: MIDWIFE

## 2022-05-04 ENCOUNTER — LAB REQUISITION (OUTPATIENT)
Dept: LAB | Facility: CLINIC | Age: 27
End: 2022-05-04
Payer: COMMERCIAL

## 2022-05-04 DIAGNOSIS — Z34.01 ENCOUNTER FOR SUPERVISION OF NORMAL FIRST PREGNANCY, FIRST TRIMESTER: ICD-10-CM

## 2022-05-04 LAB — PROGEST SERPL-MCNC: 12.3 NG/ML

## 2022-05-27 ENCOUNTER — TRANSFERRED RECORDS (OUTPATIENT)
Dept: HEALTH INFORMATION MANAGEMENT | Facility: CLINIC | Age: 27
End: 2022-05-27

## 2022-05-27 ENCOUNTER — LAB REQUISITION (OUTPATIENT)
Dept: LAB | Facility: CLINIC | Age: 27
End: 2022-05-27
Payer: COMMERCIAL

## 2022-05-27 DIAGNOSIS — Z34.81 ENCOUNTER FOR SUPERVISION OF OTHER NORMAL PREGNANCY, FIRST TRIMESTER: ICD-10-CM

## 2022-05-27 LAB
ABO/RH(D): NORMAL
ANTIBODY SCREEN: NEGATIVE
ERYTHROCYTE [DISTWIDTH] IN BLOOD BY AUTOMATED COUNT: 12.5 % (ref 10–15)
HCT VFR BLD AUTO: 43.6 % (ref 35–47)
HGB BLD-MCNC: 13.9 G/DL (ref 11.7–15.7)
HIV 1+2 AB+HIV1 P24 AG SERPL QL IA: NEGATIVE
MCH RBC QN AUTO: 28.8 PG (ref 26.5–33)
MCHC RBC AUTO-ENTMCNC: 31.9 G/DL (ref 31.5–36.5)
MCV RBC AUTO: 91 FL (ref 78–100)
PLATELET # BLD AUTO: 316 10E3/UL (ref 150–450)
PROGEST SERPL-MCNC: 24.4 NG/ML
RBC # BLD AUTO: 4.82 10E6/UL (ref 3.8–5.2)
SPECIMEN EXPIRATION DATE: NORMAL
SPECIMEN EXPIRATION DATE: NORMAL
TSH SERPL DL<=0.005 MIU/L-ACNC: 1.83 UIU/ML (ref 0.3–5)
WBC # BLD AUTO: 9 10E3/UL (ref 4–11)

## 2022-05-27 PROCEDURE — 86850 RBC ANTIBODY SCREEN: CPT | Mod: ORL | Performed by: MIDWIFE

## 2022-05-27 PROCEDURE — 86762 RUBELLA ANTIBODY: CPT | Mod: ORL | Performed by: MIDWIFE

## 2022-05-27 PROCEDURE — 84443 ASSAY THYROID STIM HORMONE: CPT | Mod: ORL | Performed by: MIDWIFE

## 2022-05-27 PROCEDURE — 84144 ASSAY OF PROGESTERONE: CPT | Mod: ORL | Performed by: MIDWIFE

## 2022-05-27 PROCEDURE — 86780 TREPONEMA PALLIDUM: CPT | Mod: ORL | Performed by: MIDWIFE

## 2022-05-27 PROCEDURE — 86803 HEPATITIS C AB TEST: CPT | Mod: ORL | Performed by: MIDWIFE

## 2022-05-27 PROCEDURE — 85027 COMPLETE CBC AUTOMATED: CPT | Mod: ORL | Performed by: MIDWIFE

## 2022-05-27 PROCEDURE — 86901 BLOOD TYPING SEROLOGIC RH(D): CPT | Mod: ORL | Performed by: MIDWIFE

## 2022-05-27 PROCEDURE — 87340 HEPATITIS B SURFACE AG IA: CPT | Performed by: MIDWIFE

## 2022-05-27 PROCEDURE — 86696 HERPES SIMPLEX TYPE 2 TEST: CPT | Mod: ORL | Performed by: MIDWIFE

## 2022-05-27 PROCEDURE — 86787 VARICELLA-ZOSTER ANTIBODY: CPT | Mod: ORL | Performed by: MIDWIFE

## 2022-05-27 PROCEDURE — 82306 VITAMIN D 25 HYDROXY: CPT | Mod: ORL | Performed by: MIDWIFE

## 2022-05-27 PROCEDURE — 87389 HIV-1 AG W/HIV-1&-2 AB AG IA: CPT | Performed by: MIDWIFE

## 2022-05-28 LAB — T PALLIDUM AB SER QL: NONREACTIVE

## 2022-05-30 LAB
DEPRECATED CALCIDIOL+CALCIFEROL SERPL-MC: 25 UG/L (ref 20–75)
HBV SURFACE AG SERPL QL IA: NONREACTIVE
HCV AB SERPL QL IA: NEGATIVE

## 2022-05-31 ENCOUNTER — TRANSFERRED RECORDS (OUTPATIENT)
Dept: HEALTH INFORMATION MANAGEMENT | Facility: CLINIC | Age: 27
End: 2022-05-31

## 2022-05-31 LAB
HSV1 IGG SERPL QL IA: 42.6 INDEX
HSV1 IGG SERPL QL IA: ABNORMAL
HSV2 IGG SERPL QL IA: 0.11 INDEX
HSV2 IGG SERPL QL IA: ABNORMAL
RUBV IGG SERPL QL IA: 2.31 INDEX
RUBV IGG SERPL QL IA: POSITIVE
VZV IGG SER QL IA: 642.6 INDEX
VZV IGG SER QL IA: POSITIVE

## 2022-06-29 ENCOUNTER — LAB REQUISITION (OUTPATIENT)
Dept: LAB | Facility: CLINIC | Age: 27
End: 2022-06-29
Payer: COMMERCIAL

## 2022-06-29 ENCOUNTER — TRANSFERRED RECORDS (OUTPATIENT)
Dept: HEALTH INFORMATION MANAGEMENT | Facility: CLINIC | Age: 27
End: 2022-06-29

## 2022-06-29 DIAGNOSIS — Z34.02 ENCOUNTER FOR SUPERVISION OF NORMAL FIRST PREGNANCY, SECOND TRIMESTER: ICD-10-CM

## 2022-06-29 PROCEDURE — 84144 ASSAY OF PROGESTERONE: CPT | Mod: ORL | Performed by: MIDWIFE

## 2022-06-29 PROCEDURE — 82306 VITAMIN D 25 HYDROXY: CPT | Mod: ORL

## 2022-06-29 PROCEDURE — 36415 COLL VENOUS BLD VENIPUNCTURE: CPT | Mod: ORL | Performed by: MIDWIFE

## 2022-06-29 PROCEDURE — 82306 VITAMIN D 25 HYDROXY: CPT | Mod: ORL | Performed by: MIDWIFE

## 2022-06-29 PROCEDURE — 36415 COLL VENOUS BLD VENIPUNCTURE: CPT | Mod: ORL

## 2022-06-29 PROCEDURE — 84144 ASSAY OF PROGESTERONE: CPT | Mod: ORL

## 2022-06-30 LAB — DEPRECATED CALCIDIOL+CALCIFEROL SERPL-MC: 48 UG/L (ref 20–75)

## 2022-07-01 LAB — PROGEST SERPL-MCNC: 31.4 NG/ML

## 2022-07-17 ENCOUNTER — HEALTH MAINTENANCE LETTER (OUTPATIENT)
Age: 27
End: 2022-07-17

## 2022-07-18 ENCOUNTER — LAB REQUISITION (OUTPATIENT)
Dept: LAB | Facility: CLINIC | Age: 27
End: 2022-07-18
Payer: COMMERCIAL

## 2022-07-18 ENCOUNTER — TRANSFERRED RECORDS (OUTPATIENT)
Dept: HEALTH INFORMATION MANAGEMENT | Facility: CLINIC | Age: 27
End: 2022-07-18

## 2022-07-18 DIAGNOSIS — Z34.02 ENCOUNTER FOR SUPERVISION OF NORMAL FIRST PREGNANCY, SECOND TRIMESTER: ICD-10-CM

## 2022-07-18 PROCEDURE — 84144 ASSAY OF PROGESTERONE: CPT | Mod: ORL | Performed by: MIDWIFE

## 2022-07-19 LAB — PROGEST SERPL-MCNC: 30 NG/ML

## 2022-07-26 ENCOUNTER — TRANSFERRED RECORDS (OUTPATIENT)
Dept: HEALTH INFORMATION MANAGEMENT | Facility: CLINIC | Age: 27
End: 2022-07-26

## 2022-08-01 ENCOUNTER — TRANSFERRED RECORDS (OUTPATIENT)
Dept: HEALTH INFORMATION MANAGEMENT | Facility: CLINIC | Age: 27
End: 2022-08-01

## 2022-08-01 PROCEDURE — 84144 ASSAY OF PROGESTERONE: CPT | Mod: ORL | Performed by: MIDWIFE

## 2022-08-02 ENCOUNTER — LAB REQUISITION (OUTPATIENT)
Dept: LAB | Facility: CLINIC | Age: 27
End: 2022-08-02
Payer: MEDICAID

## 2022-08-02 LAB — PROGEST SERPL-MCNC: 39.2 NG/ML

## 2022-08-03 ENCOUNTER — TRANSFERRED RECORDS (OUTPATIENT)
Dept: HEALTH INFORMATION MANAGEMENT | Facility: CLINIC | Age: 27
End: 2022-08-03

## 2022-08-04 ENCOUNTER — TRANSFERRED RECORDS (OUTPATIENT)
Dept: HEALTH INFORMATION MANAGEMENT | Facility: CLINIC | Age: 27
End: 2022-08-04

## 2022-08-05 ENCOUNTER — HOSPITAL ENCOUNTER (OUTPATIENT)
Dept: CARDIOLOGY | Facility: CLINIC | Age: 27
Discharge: HOME | End: 2022-08-05
Attending: PEDIATRICS | Admitting: PEDIATRICS
Payer: MEDICAID

## 2022-08-05 ENCOUNTER — OFFICE VISIT (OUTPATIENT)
Dept: CARDIOLOGY | Facility: CLINIC | Age: 27
End: 2022-08-05
Payer: MEDICAID

## 2022-08-05 DIAGNOSIS — Q89.9 CONGENITAL ABNORMALITY: ICD-10-CM

## 2022-08-05 DIAGNOSIS — O35.BXX0 FETAL CARDIAC DISEASE AFFECTING PREGNANCY, SINGLE OR UNSPECIFIED FETUS: Primary | ICD-10-CM

## 2022-08-05 PROCEDURE — 93325 DOPPLER ECHO COLOR FLOW MAPG: CPT

## 2022-08-05 PROCEDURE — 99205 OFFICE O/P NEW HI 60 MIN: CPT | Mod: 25 | Performed by: PEDIATRICS

## 2022-08-05 PROCEDURE — 76827 ECHO EXAM OF FETAL HEART: CPT | Mod: 26 | Performed by: PEDIATRICS

## 2022-08-05 PROCEDURE — 76825 ECHO EXAM OF FETAL HEART: CPT | Mod: 26 | Performed by: PEDIATRICS

## 2022-08-05 PROCEDURE — 93325 DOPPLER ECHO COLOR FLOW MAPG: CPT | Mod: 26 | Performed by: PEDIATRICS

## 2022-08-05 NOTE — PROGRESS NOTES
[facility]   Heart Center Fetal Consult Note    Patient:  Jennifer El MRN:  [id number]   YOB: 1995 Age:  27 year old   Date of Visit:  2022 PCP:  No Ref-Primary, Physician     Dear Doctor,     I had the pleasure of seeing Jennifer El at the [facility] on 2022 in fetal cardiology consultation for fetal echocardiogram results. She presented today accompanied by her  and mother. 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 The ascending aorta, transverse arch, and aortic isthmus are severely hypoplastic with retrograde flow from the ductus arteriosus. The atrial septum based on 2D and Doppler is concerning for mild restriction; the pulmonary venous Doppler VTI is normal. Mildly dilated right ventricle with normal systolic function. No hydrops.       With the help of diagrams, I reviewed the echo findings today with Jennifer El, her mother, and her partner. I discussed the fetal cardiac anatomy, function, physiology, and plans for intervention following delivery. I recommended delivery at [facility], with plans for PGE and post-wander echocardiogram. I reviewed the importance of a post-wander transthoracic echocardiogram to confirm these findings and help direct management. She had appropriate questions which I addressed. I provided her with my direct contact information for additional questions after they left.    Plan:    1. Follow up Fetal Echo at 26-27 weeks gestatoin.  2. This anatomy is dependent on the ductus arteriosus, and PGE will be needed following delivery.  3. Transthoracic echocardiogram to be obtained after delivery immediately on arrival in the NICU.   4.  neonatology  consultation and attendance at delivery is recommended.   5.  cardiothoracic surgery consultation is recommended.     Thank you for allowing me to participate in Jennifer's care. Please do not hesitate to contact me with questions or concerns.    This visit was separate from the performance and interpretation of the ultrasound. The majority of the time (>50%) was spent in counseling and coordination of care. I spent approximately 60 minutes in face-to-face time reviewing the above considerations.    Fernando Matson M.D.  Pediatric Cardiology  [facility] [address], 5th floor, [place] [address]  Phone [phone]  Fax [phone]

## 2022-08-08 DIAGNOSIS — O35.BXX0 ANOMALY OF HEART OF FETUS AFFECTING PREGNANCY, ANTEPARTUM, SINGLE OR UNSPECIFIED FETUS: Primary | ICD-10-CM

## 2022-08-12 ENCOUNTER — TRANSFERRED RECORDS (OUTPATIENT)
Dept: HEALTH INFORMATION MANAGEMENT | Facility: CLINIC | Age: 27
End: 2022-08-12

## 2022-08-15 ENCOUNTER — TRANSFERRED RECORDS (OUTPATIENT)
Dept: HEALTH INFORMATION MANAGEMENT | Facility: CLINIC | Age: 27
End: 2022-08-15

## 2022-08-15 PROCEDURE — 81001 URINALYSIS AUTO W/SCOPE: CPT | Mod: ORL | Performed by: MIDWIFE

## 2022-08-15 PROCEDURE — 82728 ASSAY OF FERRITIN: CPT | Mod: ORL | Performed by: MIDWIFE

## 2022-08-15 PROCEDURE — 84144 ASSAY OF PROGESTERONE: CPT | Mod: ORL | Performed by: MIDWIFE

## 2022-08-15 PROCEDURE — 85027 COMPLETE CBC AUTOMATED: CPT | Mod: ORL | Performed by: MIDWIFE

## 2022-08-16 ENCOUNTER — LAB REQUISITION (OUTPATIENT)
Dept: LAB | Facility: CLINIC | Age: 27
End: 2022-08-16
Payer: MEDICAID

## 2022-08-16 DIAGNOSIS — Z34.02 ENCOUNTER FOR SUPERVISION OF NORMAL FIRST PREGNANCY, SECOND TRIMESTER: ICD-10-CM

## 2022-08-16 LAB
ALBUMIN UR-MCNC: NEGATIVE MG/DL
APPEARANCE UR: CLEAR
BILIRUB UR QL STRIP: NEGATIVE
COLOR UR AUTO: ABNORMAL
ERYTHROCYTE [DISTWIDTH] IN BLOOD BY AUTOMATED COUNT: 12.9 % (ref 10–15)
FERRITIN SERPL-MCNC: 26 NG/ML (ref 6–175)
GLUCOSE UR STRIP-MCNC: NEGATIVE MG/DL
HCT VFR BLD AUTO: 37.9 % (ref 35–47)
HGB BLD-MCNC: 12.1 G/DL (ref 11.7–15.7)
HGB UR QL STRIP: NEGATIVE
KETONES UR STRIP-MCNC: NEGATIVE MG/DL
LEUKOCYTE ESTERASE UR QL STRIP: NEGATIVE
MCH RBC QN AUTO: 29.1 PG (ref 26.5–33)
MCHC RBC AUTO-ENTMCNC: 31.9 G/DL (ref 31.5–36.5)
MCV RBC AUTO: 91 FL (ref 78–100)
MUCOUS THREADS #/AREA URNS LPF: PRESENT /LPF
NITRATE UR QL: NEGATIVE
PH UR STRIP: 6.5 [PH] (ref 5–7)
PLATELET # BLD AUTO: 325 10E3/UL (ref 150–450)
PROGEST SERPL-MCNC: 41.7 NG/ML
RBC # BLD AUTO: 4.16 10E6/UL (ref 3.8–5.2)
RBC URINE: <1 /HPF
SP GR UR STRIP: 1.01 (ref 1–1.03)
SQUAMOUS EPITHELIAL: <1 /HPF
UROBILINOGEN UR STRIP-MCNC: NORMAL MG/DL
WBC # BLD AUTO: 14.8 10E3/UL (ref 4–11)
WBC URINE: 1 /HPF

## 2022-08-18 ENCOUNTER — PRE VISIT (OUTPATIENT)
Dept: MATERNAL FETAL MEDICINE | Facility: CLINIC | Age: 27
End: 2022-08-18

## 2022-08-23 ENCOUNTER — PRE VISIT (OUTPATIENT)
Dept: MATERNAL FETAL MEDICINE | Facility: CLINIC | Age: 27
End: 2022-08-23

## 2022-08-26 ENCOUNTER — HOSPITAL ENCOUNTER (OUTPATIENT)
Dept: ULTRASOUND IMAGING | Facility: CLINIC | Age: 27
Discharge: HOME | End: 2022-08-26
Attending: OBSTETRICS & GYNECOLOGY
Payer: MEDICAID

## 2022-08-26 ENCOUNTER — OFFICE VISIT (OUTPATIENT)
Dept: MATERNAL FETAL MEDICINE | Facility: CLINIC | Age: 27
End: 2022-08-26
Attending: OBSTETRICS & GYNECOLOGY
Payer: MEDICAID

## 2022-08-26 ENCOUNTER — TRANSFERRED RECORDS (OUTPATIENT)
Dept: HEALTH INFORMATION MANAGEMENT | Facility: CLINIC | Age: 27
End: 2022-08-26

## 2022-08-26 DIAGNOSIS — O35.BXX0 ANOMALY OF HEART OF FETUS AFFECTING PREGNANCY, ANTEPARTUM, SINGLE OR UNSPECIFIED FETUS: ICD-10-CM

## 2022-08-26 DIAGNOSIS — O35.BXX0 ANOMALY OF HEART OF FETUS AFFECTING PREGNANCY, ANTEPARTUM, SINGLE OR UNSPECIFIED FETUS: Primary | ICD-10-CM

## 2022-08-26 PROCEDURE — 76816 OB US FOLLOW-UP PER FETUS: CPT

## 2022-08-26 PROCEDURE — 76816 OB US FOLLOW-UP PER FETUS: CPT | Mod: 26 | Performed by: OBSTETRICS & GYNECOLOGY

## 2022-08-26 NOTE — PROGRESS NOTES
Please see the imaging tab for details of the ultrasound performed today.    Joleen Alberto MD  Specialist in Maternal-Fetal Medicine

## 2022-09-06 PROCEDURE — 85027 COMPLETE CBC AUTOMATED: CPT | Mod: ORL | Performed by: MIDWIFE

## 2022-09-06 PROCEDURE — 84144 ASSAY OF PROGESTERONE: CPT | Mod: ORL | Performed by: MIDWIFE

## 2022-09-06 PROCEDURE — 82950 GLUCOSE TEST: CPT | Mod: ORL | Performed by: MIDWIFE

## 2022-09-07 ENCOUNTER — OFFICE VISIT (OUTPATIENT)
Dept: CARDIOLOGY | Facility: CLINIC | Age: 27
End: 2022-09-07
Payer: MEDICAID

## 2022-09-07 ENCOUNTER — LAB REQUISITION (OUTPATIENT)
Dept: LAB | Facility: CLINIC | Age: 27
End: 2022-09-07
Payer: MEDICAID

## 2022-09-07 ENCOUNTER — HOSPITAL ENCOUNTER (OUTPATIENT)
Dept: CARDIOLOGY | Facility: CLINIC | Age: 27
Discharge: HOME | End: 2022-09-07
Attending: OBSTETRICS & GYNECOLOGY | Admitting: OBSTETRICS & GYNECOLOGY
Payer: MEDICAID

## 2022-09-07 DIAGNOSIS — O35.BXX0 ANOMALY OF HEART OF FETUS AFFECTING PREGNANCY, ANTEPARTUM, SINGLE OR UNSPECIFIED FETUS: ICD-10-CM

## 2022-09-07 DIAGNOSIS — Z34.02 ENCOUNTER FOR SUPERVISION OF NORMAL FIRST PREGNANCY, SECOND TRIMESTER: ICD-10-CM

## 2022-09-07 DIAGNOSIS — Z36.0 ENCOUNTER FOR ANTENATAL SCREENING FOR CHROMOSOMAL ANOMALIES: ICD-10-CM

## 2022-09-07 DIAGNOSIS — O35.BXX0 FETAL CARDIAC DISEASE AFFECTING PREGNANCY, SINGLE OR UNSPECIFIED FETUS: Primary | ICD-10-CM

## 2022-09-07 LAB
ERYTHROCYTE [DISTWIDTH] IN BLOOD BY AUTOMATED COUNT: 12.9 % (ref 10–15)
GLUCOSE 1H P 50 G GLC PO SERPL-MCNC: 89 MG/DL (ref 70–129)
HCT VFR BLD AUTO: 37.8 % (ref 35–47)
HGB BLD-MCNC: 11.9 G/DL (ref 11.7–15.7)
HOLD SPECIMEN: NORMAL
MCH RBC QN AUTO: 29.1 PG (ref 26.5–33)
MCHC RBC AUTO-ENTMCNC: 31.5 G/DL (ref 31.5–36.5)
MCV RBC AUTO: 92 FL (ref 78–100)
PLATELET # BLD AUTO: 315 10E3/UL (ref 150–450)
PROGEST SERPL-MCNC: 46.9 NG/ML
RBC # BLD AUTO: 4.09 10E6/UL (ref 3.8–5.2)
WBC # BLD AUTO: 14.6 10E3/UL (ref 4–11)

## 2022-09-07 PROCEDURE — 93325 DOPPLER ECHO COLOR FLOW MAPG: CPT

## 2022-09-07 PROCEDURE — 93325 DOPPLER ECHO COLOR FLOW MAPG: CPT | Mod: 26 | Performed by: PEDIATRICS

## 2022-09-07 PROCEDURE — 76825 ECHO EXAM OF FETAL HEART: CPT | Mod: 26 | Performed by: PEDIATRICS

## 2022-09-07 PROCEDURE — 99215 OFFICE O/P EST HI 40 MIN: CPT | Mod: 25 | Performed by: PEDIATRICS

## 2022-09-07 PROCEDURE — 76827 ECHO EXAM OF FETAL HEART: CPT | Mod: 26 | Performed by: PEDIATRICS

## 2022-09-07 NOTE — PROGRESS NOTES
[facility]   Heart Center Fetal Consult Note    Patient:  Jennifer El MRN:  [id number]   YOB: 1995 Age:  27 year old   Date of Visit:  2022 PCP:  No Ref-Primary, Physician     Dear Doctor,     I had the pleasure of seeing Jennifer El at the [facility] on 2022 in fetal cardiology consultation for fetal echocardiogram results. She presented today accompanied by her  and mother. As you know, she is a 27 year old  at 27w0d who presented for fetal echocardiogram today because of follow up for HLHS.    I performed and interpreted the fetal echocardiogram today, which demonstrated hypoplsastic left heart syndrome MS/AA vs. MA/AA. The ascending aorta, transverse arch, and aortic isthmus are severely hypoplastic with retrograde flow from the ductus arteriosus. The atrial septum based on 2D and Doppler is restricted; the pulmonary venous foward to reverse ratio is < 5. Mildly dilated right ventricle with normal systolic function. No hydrops.     With the help of diagrams, I reviewed the echo findings today with Jennifer El, her mother, and her partner. I discussed the fetal cardiac anatomy, function, physiology, and plans for intervention following delivery. I recommended delivery at [facility], with plans for post-wander echocardiogram and likely balloon atrial septostomy. I reviewed the importance of a post-wander transthoracic echocardiogram to confirm these findings and help direct management. She had appropriate questions which I addressed. I provided her with my direct contact information for additional questions after they left.    Plan:    1. Follow up Fetal Echo in 32-34 weeks gestation.   2. This anatomy is dependent on the ductus arteriosus, and PGE will be needed following delivery.  3. Transthoracic echocardiogram to be obtained after delivery immediately on arrival in the NICU.    Thank you for allowing me to participate in  Jennifer's care. Please do not hesitate to contact me with questions or concerns.    This visit was separate from the performance and interpretation of the ultrasound. The majority of the time (>50%) was spent in counseling and coordination of care. I spent approximately 45 minutes in face-to-face time reviewing the above considerations.    Fernando Matson M.D.  Pediatric Cardiology  [address], 5th floor, [place] [address]  Phone [phone]  Fax [phone]

## 2022-09-08 ENCOUNTER — TRANSFERRED RECORDS (OUTPATIENT)
Dept: HEALTH INFORMATION MANAGEMENT | Facility: CLINIC | Age: 27
End: 2022-09-08

## 2022-09-13 DIAGNOSIS — O35.BXX0: Primary | ICD-10-CM

## 2022-09-13 ASSESSMENT — ENCOUNTER SYMPTOMS
DYSURIA: 0
PANIC: 0
FLANK PAIN: 0
DIFFICULTY URINATING: 1
NERVOUS/ANXIOUS: 1
DEPRESSION: 0
HEMATURIA: 0
INSOMNIA: 1
DECREASED CONCENTRATION: 0

## 2022-09-13 ASSESSMENT — ANXIETY QUESTIONNAIRES
4. TROUBLE RELAXING: SEVERAL DAYS
7. FEELING AFRAID AS IF SOMETHING AWFUL MIGHT HAPPEN: MORE THAN HALF THE DAYS
8. IF YOU CHECKED OFF ANY PROBLEMS, HOW DIFFICULT HAVE THESE MADE IT FOR YOU TO DO YOUR WORK, TAKE CARE OF THINGS AT HOME, OR GET ALONG WITH OTHER PEOPLE?: SOMEWHAT DIFFICULT
GAD7 TOTAL SCORE: 7
2. NOT BEING ABLE TO STOP OR CONTROL WORRYING: SEVERAL DAYS
6. BECOMING EASILY ANNOYED OR IRRITABLE: SEVERAL DAYS
GAD7 TOTAL SCORE: 7
1. FEELING NERVOUS, ANXIOUS, OR ON EDGE: SEVERAL DAYS
3. WORRYING TOO MUCH ABOUT DIFFERENT THINGS: SEVERAL DAYS
7. FEELING AFRAID AS IF SOMETHING AWFUL MIGHT HAPPEN: MORE THAN HALF THE DAYS
IF YOU CHECKED OFF ANY PROBLEMS ON THIS QUESTIONNAIRE, HOW DIFFICULT HAVE THESE PROBLEMS MADE IT FOR YOU TO DO YOUR WORK, TAKE CARE OF THINGS AT HOME, OR GET ALONG WITH OTHER PEOPLE: SOMEWHAT DIFFICULT
5. BEING SO RESTLESS THAT IT IS HARD TO SIT STILL: NOT AT ALL

## 2022-09-16 ENCOUNTER — OFFICE VISIT (OUTPATIENT)
Dept: OBGYN | Facility: CLINIC | Age: 27
End: 2022-09-16
Attending: ADVANCED PRACTICE MIDWIFE
Payer: MEDICAID

## 2022-09-16 VITALS
HEART RATE: 104 BPM | HEIGHT: 69 IN | BODY MASS INDEX: 32.92 KG/M2 | SYSTOLIC BLOOD PRESSURE: 137 MMHG | DIASTOLIC BLOOD PRESSURE: 84 MMHG | WEIGHT: 222.3 LBS

## 2022-09-16 DIAGNOSIS — O09.90 HIGH RISK PREGNANCY, ANTEPARTUM: ICD-10-CM

## 2022-09-16 DIAGNOSIS — O35.BXX0 ANOMALY OF HEART OF FETUS AFFECTING PREGNANCY, ANTEPARTUM, SINGLE OR UNSPECIFIED FETUS: ICD-10-CM

## 2022-09-16 PROCEDURE — 99207 PR PRENATAL VISIT: CPT | Performed by: ADVANCED PRACTICE MIDWIFE

## 2022-09-16 PROCEDURE — G0463 HOSPITAL OUTPT CLINIC VISIT: HCPCS

## 2022-09-16 RX ORDER — MULTIVIT-MIN/IRON/FOLIC ACID/K 18-600-40
4000 CAPSULE ORAL DAILY
COMMUNITY
End: 2023-01-24

## 2022-09-16 RX ORDER — ONDANSETRON 8 MG/1
8 TABLET, FILM COATED ORAL 3 TIMES DAILY PRN
COMMUNITY
Start: 2022-05-01 | End: 2023-01-24

## 2022-09-16 RX ORDER — PNV NO.95/FERROUS FUM/FOLIC AC 28MG-0.8MG
TABLET ORAL
COMMUNITY
End: 2023-01-24

## 2022-09-16 RX ORDER — PROGESTERONE 200 MG/1
200 CAPSULE ORAL 2 TIMES DAILY
Status: ON HOLD | COMMUNITY
Start: 2022-06-01 | End: 2022-11-18

## 2022-09-16 ASSESSMENT — ANXIETY QUESTIONNAIRES
7. FEELING AFRAID AS IF SOMETHING AWFUL MIGHT HAPPEN: MORE THAN HALF THE DAYS
5. BEING SO RESTLESS THAT IT IS HARD TO SIT STILL: NOT AT ALL
6. BECOMING EASILY ANNOYED OR IRRITABLE: NOT AT ALL
IF YOU CHECKED OFF ANY PROBLEMS ON THIS QUESTIONNAIRE, HOW DIFFICULT HAVE THESE PROBLEMS MADE IT FOR YOU TO DO YOUR WORK, TAKE CARE OF THINGS AT HOME, OR GET ALONG WITH OTHER PEOPLE: NOT DIFFICULT AT ALL
2. NOT BEING ABLE TO STOP OR CONTROL WORRYING: SEVERAL DAYS
3. WORRYING TOO MUCH ABOUT DIFFERENT THINGS: SEVERAL DAYS
GAD7 TOTAL SCORE: 5
1. FEELING NERVOUS, ANXIOUS, OR ON EDGE: SEVERAL DAYS

## 2022-09-16 ASSESSMENT — PATIENT HEALTH QUESTIONNAIRE - PHQ9
5. POOR APPETITE OR OVEREATING: NOT AT ALL
SUM OF ALL RESPONSES TO PHQ QUESTIONS 1-9: 2

## 2022-09-16 ASSESSMENT — PAIN SCALES - GENERAL: PAINLEVEL: NO PAIN (0)

## 2022-09-16 NOTE — LETTER
Date:September 19, 2022      Provider requested that no letter be sent. Do not send.       [facility]

## 2022-09-16 NOTE — LETTER
2022       RE: Jennifer El  [address] [place] [address]     Dear Colleague,    Thank you for referring your patient, Jennifer El, to the [facility] [place] at [facility]. Please see a copy of my visit note below.    Transfer of Care  SUBJECTIVE  27 year old woman presents to clinic for transfer of OB care appointment.    Patient's last menstrual period was 2022.  at 28w2d by Estimated Date of Delivery: Dec 7, 2022 based on LMP.     - Feels well overall.   - Pt was receiving prenatal care from [facility], prenatal records not sent, lab work and ultrasound reports available to review.      - Reason for transfer to [facility] care need to deliver with NICU due to fetal cardiac anomaly-Hypoplastic left heart.   - prenatal records not yet received    - After review of prenatal records, additional routine orders are recommended including Hepatitis B antibody  -Level II Ultrasound abnormal results, follow up per [facility] due to fetal HLHS        OTHER CONCERNS: wondering about starting induction with home birth team (castellanos balloon placement).        - Current Medications    Current Outpatient Medications   Medication Sig Dispense Refill     Doxylamine Succinate, Sleep, (UNISOM PO) Take 1 tablet by mouth daily       Ferrous Sulfate (IRON SUPPLEMENT PO) Take 1 tablet by mouth daily       magnesium citrate solution (NOT currently available) Take 296 mLs by mouth once       ondansetron (ZOFRAN) 8 MG tablet 8 mg 3 times daily as needed       Prenatal Vit-Fe Fumarate-FA (PRENATAL VITAMIN AND MINERAL) 28-0.8 MG TABS        progesterone (PROMETRIUM) 200 MG capsule 400 mg 2 times daily       Vitamin D, Cholecalciferol, 25 MCG (1000 UT) TABS Take 4,000 Units by mouth daily           - Co-morbids    Past Medical History:   Diagnosis Date     Migraines        - Risk for GDM -  Has completed routine GCT and was  normal    - High Risk for Pre E-  NA        - The patient  does not have a history of spontaneous  birth so  WILL NOT consider progesterone starting at 16-20 weeks and/or serial transvaginal cervical length ultrasounds from 16-24 weeks.     -The patient does not have a history of immunosuppresion or HIV so Toxoplasma IgG/IgM WILL NOT be ordered.    PERSONAL/SOCIAL HISTORY  Partner is involved,  Allie  , he works with youth ministry  lives with their spouse.  Employment: Part time. 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  History of anxiety or depression  no    PSYCHIATRIC:  Denies depression  PHQ-9 score:    PHQ-9 SCORE 2022   PHQ-9 Total Score 2     YI-7 SCORE 2022   Total Score 7 (mild anxiety) -   Total Score 7 5         Past History:  Her past medical history   Past Medical History:   Diagnosis Date     Migraines    .   This is her first pregnancy  Since her last LMP she denies use of alcohol, tobacco and street drugs.  HISTORY:  Family History   Adopted: Yes   Problem Relation Age of Onset     Unknown/Adopted No family hx of      Social History     Socioeconomic History     Marital status:      Spouse name: Allie     Number of children: None     Years of education: None     Highest education level: None   Tobacco Use     Smoking status: Never Smoker     Smokeless tobacco: Never Used   Vaping Use     Vaping Use: Never used   Substance and Sexual Activity     Alcohol use: Not Currently     Drug use: Never     Sexual activity: Yes     Partners: Male     Birth control/protection: None     Current Outpatient Medications   Medication Sig     Doxylamine Succinate, Sleep, (UNISOM PO) Take 1 tablet by mouth daily     Ferrous Sulfate (IRON SUPPLEMENT PO) Take 1 tablet by mouth daily     magnesium citrate solution (NOT currently available) Take 296 mLs by mouth once     ondansetron (ZOFRAN) 8 MG tablet 8 mg 3 times daily as needed     Prenatal  "Vit-Fe Fumarate-FA (PRENATAL VITAMIN AND MINERAL) 28-0.8 MG TABS      progesterone (PROMETRIUM) 200 MG capsule 400 mg 2 times daily     Vitamin D, Cholecalciferol, 25 MCG (1000 UT) TABS Take 4,000 Units by mouth daily     No current facility-administered medications for this visit.     No Known Allergies    ============================================  MEDICAL HISTORY  Past Medical History:   Diagnosis Date     Migraines      History reviewed. No pertinent surgical history.    OB History    Para Term  AB Living   1 0 0 0 0 0   SAB IAB Ectopic Multiple Live Births   0 0 0 0 0      # Outcome Date GA Lbr Yeison/2nd Weight Sex Delivery Anes PTL Lv   1 Current                    GYN History- denies Abnormal Pap Smears                        Cervical procedures: denies                        History of STI: no    I personally reviewed the past social/family/medical and surgical history on the date of service.       ROS: 10 point ROS neg other than the symptoms noted above in the HPI.    Objective  /84   Pulse 104   Ht 1.753 m (5' 9\")   Wt 100.8 kg (222 lb 4.8 oz)   LMP 2022   BMI 32.83 kg/m     EXAM:  GENERAL:  Pleasant pregnant female, alert, cooperative and well groomed.  SKIN:  Warm and dry, without lesions or rashes  HEAD: Symmetrical features.  MOUTH:  Buccal mucosa pink, moist without lesions.  Teeth in good repair.    ABDOMEN: Soft without masses , tenderness or organomegaly.  No CVA tenderness.  Uterus palpable at size equal to dates.  No scars noted.. Fetal heart tones present.  MUSCULOSKELETAL:  Full range of motion  EXTREMITIES:  No edema. No significant varicosities.   PELVIC EXAM: deferred    GC/CHLAMYDIA CULTURE OBTAINED:NO     No results found for: PAP     Assessment/Plan  27 year old , 28w3d weeks of pregnancy with ELBA of Dec 7, 2022 by US confirms  Intrauterine pregnancy 28w3d size  consistent with dates        - Oriented to Practice, types of care, and how to reach a " provider.  Pt prefers CNM team  - Patient has ultrasound follow up planned  - Educational handout on the prevention of infections diseases during pregnancy provided.  - Reviewed healthy diet and foods to avoid, exercise and activity during pregnancy; avoiding exposure to toxoplasmosis; and maintenance of a generally healthy lifestyle.   - Discussed the harms, benefits, side effects and alternative therapies for current prescribed and OTC medications. Patient was encouraged to start prenatal vitamins as tolerated.    - Reviewed use of triage nurse line and contacting the on-call provider after hours for an urgent need such as fever, vagina bleeding, bladder or vaginal infection, rupture of membranes,  or term labor.      - All pt's and partner's questions discussed and answered.  Pt verbalized understanding of and agreement to plan of care.   - Will review patient's request for starting induction out of hospital with CNM group and will follow up with patient  - Continue scheduled prenatal care and prn if questions or concerns    MARIO Lucas CNM      Again, thank you for allowing me to participate in the care of your patient.      Sincerely,    MARIO Lucas CNM

## 2022-09-19 ENCOUNTER — TRANSFERRED RECORDS (OUTPATIENT)
Dept: HEALTH INFORMATION MANAGEMENT | Facility: CLINIC | Age: 27
End: 2022-09-19

## 2022-09-19 PROBLEM — O35.BXX0 FETAL CARDIAC ANOMALY AFFECTING PREGNANCY, ANTEPARTUM: Status: ACTIVE | Noted: 2022-09-19

## 2022-09-19 NOTE — PROGRESS NOTES
Transfer of Care  SUBJECTIVE  27 year old woman presents to clinic for transfer of OB care appointment.    Patient's last menstrual period was 2022.  at 28w2d by Estimated Date of Delivery: Dec 7, 2022 based on LMP.     - Feels well overall.   - Pt was receiving prenatal care from [facility], prenatal records not sent, lab work and ultrasound reports available to review.      - Reason for transfer to [facility] care need to deliver with NICU due to fetal cardiac anomaly-Hypoplastic left heart.   - prenatal records not yet received    - After review of prenatal records, additional routine orders are recommended including Hepatitis B antibody  -Level II Ultrasound abnormal results, follow up per [facility] due to fetal HLHS        OTHER CONCERNS: wondering about starting induction with home birth team (castellanos balloon placement).        - Current Medications    Current Outpatient Medications   Medication Sig Dispense Refill     Doxylamine Succinate, Sleep, (UNISOM PO) Take 1 tablet by mouth daily       Ferrous Sulfate (IRON SUPPLEMENT PO) Take 1 tablet by mouth daily       magnesium citrate solution (NOT currently available) Take 296 mLs by mouth once       ondansetron (ZOFRAN) 8 MG tablet 8 mg 3 times daily as needed       Prenatal Vit-Fe Fumarate-FA (PRENATAL VITAMIN AND MINERAL) 28-0.8 MG TABS        progesterone (PROMETRIUM) 200 MG capsule 400 mg 2 times daily       Vitamin D, Cholecalciferol, 25 MCG (1000 UT) TABS Take 4,000 Units by mouth daily           - Co-morbids    Past Medical History:   Diagnosis Date     2014       - Risk for GDM -  Has completed routine GCT and was normal    - High Risk for Pre E-  NA        - The patient  does not have a history of spontaneous  birth so  WILL NOT consider progesterone starting at 16-20 weeks and/or serial transvaginal cervical length ultrasounds from 16-24 weeks.     -The patient does not have a history of immunosuppresion or HIV so Toxoplasma  IgG/IgM WILL NOT be ordered.    PERSONAL/SOCIAL HISTORY  Partner is involved,  Allie  , he works with youth ministry  lives with their spouse.  Employment: Part time. 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  History of anxiety or depression  no    PSYCHIATRIC:  Denies depression  PHQ-9 score:    PHQ-9 SCORE 9/16/2022   PHQ-9 Total Score 2     YI-7 SCORE 9/13/2022 9/16/2022   Total Score 7 (mild anxiety) -   Total Score 7 5         Past History:  Her past medical history   Past Medical History:   Diagnosis Date     Migraines 2014   .   This is her first pregnancy  Since her last LMP she denies use of alcohol, tobacco and street drugs.  HISTORY:  Family History   Adopted: Yes   Problem Relation Age of Onset     Unknown/Adopted No family hx of      Social History     Socioeconomic History     Marital status:      Spouse name: Allie     Number of children: None     Years of education: None     Highest education level: None   Tobacco Use     Smoking status: Never Smoker     Smokeless tobacco: Never Used   Vaping Use     Vaping Use: Never used   Substance and Sexual Activity     Alcohol use: Not Currently     Drug use: Never     Sexual activity: Yes     Partners: Male     Birth control/protection: None     Current Outpatient Medications   Medication Sig     Doxylamine Succinate, Sleep, (UNISOM PO) Take 1 tablet by mouth daily     Ferrous Sulfate (IRON SUPPLEMENT PO) Take 1 tablet by mouth daily     magnesium citrate solution (NOT currently available) Take 296 mLs by mouth once     ondansetron (ZOFRAN) 8 MG tablet 8 mg 3 times daily as needed     Prenatal Vit-Fe Fumarate-FA (PRENATAL VITAMIN AND MINERAL) 28-0.8 MG TABS      progesterone (PROMETRIUM) 200 MG capsule 400 mg 2 times daily     Vitamin D, Cholecalciferol, 25 MCG (1000 UT) TABS Take 4,000 Units by mouth daily     No current facility-administered medications for this visit.     No Known  "Allergies    ============================================  MEDICAL HISTORY  Past Medical History:   Diagnosis Date     Migraines      History reviewed. No pertinent surgical history.    OB History    Para Term  AB Living   1 0 0 0 0 0   SAB IAB Ectopic Multiple Live Births   0 0 0 0 0      # Outcome Date GA Lbr Yeison/2nd Weight Sex Delivery Anes PTL Lv   1 Current                    GYN History- denies Abnormal Pap Smears                        Cervical procedures: denies                        History of STI: no    I personally reviewed the past social/family/medical and surgical history on the date of service.       ROS: 10 point ROS neg other than the symptoms noted above in the HPI.    Objective  /84   Pulse 104   Ht 1.753 m (5' 9\")   Wt 100.8 kg (222 lb 4.8 oz)   LMP 2022   BMI 32.83 kg/m     EXAM:  GENERAL:  Pleasant pregnant female, alert, cooperative and well groomed.  SKIN:  Warm and dry, without lesions or rashes  HEAD: Symmetrical features.  MOUTH:  Buccal mucosa pink, moist without lesions.  Teeth in good repair.    ABDOMEN: Soft without masses , tenderness or organomegaly.  No CVA tenderness.  Uterus palpable at size equal to dates.  No scars noted.. Fetal heart tones present.  MUSCULOSKELETAL:  Full range of motion  EXTREMITIES:  No edema. No significant varicosities.   PELVIC EXAM: deferred    GC/CHLAMYDIA CULTURE OBTAINED:NO     No results found for: PAP     Assessment/Plan  27 year old , 28w3d weeks of pregnancy with ELBA of Dec 7, 2022 by US confirms  Intrauterine pregnancy 28w3d size  consistent with dates        - Oriented to Practice, types of care, and how to reach a provider.  Pt prefers CNM team  - Patient has ultrasound follow up planned  - Educational handout on the prevention of infections diseases during pregnancy provided.  - Reviewed healthy diet and foods to avoid, exercise and activity during pregnancy; avoiding exposure to toxoplasmosis; and " maintenance of a generally healthy lifestyle.   - Discussed the harms, benefits, side effects and alternative therapies for current prescribed and OTC medications. Patient was encouraged to start prenatal vitamins as tolerated.    - Reviewed use of triage nurse line and contacting the on-call provider after hours for an urgent need such as fever, vagina bleeding, bladder or vaginal infection, rupture of membranes,  or term labor.      - All pt's and partner's questions discussed and answered.  Pt verbalized understanding of and agreement to plan of care.   - Will review patient's request for starting induction out of hospital with CNM group and will follow up with patient  - Continue scheduled prenatal care and prn if questions or concerns    MARIO Lucas CNM

## 2022-09-21 LAB
ABO (EXTERNAL): NORMAL
HEMATOCRIT (EXTERNAL): 43.6 % (ref 35.4–44.4)
HEMOGLOBIN (EXTERNAL): 13.9 G/DL (ref 10.5–16)
HEPATITIS C ANTIBODY (EXTERNAL): NONREACTIVE
HIV1+2 AB SERPL QL IA: NONREACTIVE
HIV1+2 AB SERPL QL IA: NONREACTIVE
PLATELET COUNT (EXTERNAL): 316 10E3/UL (ref 165–415)
RH (EXTERNAL): POSITIVE
RUBELLA ANTIBODY IGG (EXTERNAL): NORMAL
TREPONEMA PALLIDUM ANTIBODY (EXTERNAL): NONREACTIVE

## 2022-09-23 ENCOUNTER — ALLIED HEALTH/NURSE VISIT (OUTPATIENT)
Dept: PEDIATRIC CARDIOLOGY | Facility: CLINIC | Age: 27
End: 2022-09-23
Attending: THORACIC SURGERY (CARDIOTHORACIC VASCULAR SURGERY)
Payer: MEDICAID

## 2022-09-23 DIAGNOSIS — O35.BXX0 FETUS WITH COMPLEX CONGENITAL HEART DISEASE, ANTEPARTUM, SINGLE OR UNSPECIFIED FETUS: Primary | ICD-10-CM

## 2022-09-23 PROCEDURE — 99203 OFFICE O/P NEW LOW 30 MIN: CPT | Performed by: THORACIC SURGERY (CARDIOTHORACIC VASCULAR SURGERY)

## 2022-09-23 NOTE — PATIENT INSTRUCTIONS
[facility]  [address]  EXPLORER CLINIC  [address]  [facility] [address] [phone]      Cardiology Clinic   RN Care Coordinators: Vanessa Franklin or Jerica Crockett  [phone]  Pediatric Call Center/Scheduling  [phone]    After Hours and Emergency Contact Number  [phone]  * Ask for the pediatric cardiologist on call         Prescription Renewals  The pharmacy must fax requests to [phone]  * Please allow 3-4 days for prescriptions to be authorized     Imaging Scheduling for Peds Cardiology  Shahzadana lilia Jeevan [phone]  SHE WILL REACH OUT TO YOU TO SCHEDULE ANY IMAGING NEEDS THAT WERE ORDERED.    Your feedback is very important to us. If you receive a survey about your visit today, please take the time to fill this out so we can continue to improve.

## 2022-09-23 NOTE — PROGRESS NOTES
I had the pleasure of seeing Jennifer El at the [facility] on 2022 in prenatal CV surgery consultation for fetal echocardiogram results. She presented today accompanied by her  and mother. As you know, she is a 27 year old  at 27w0d who presented for fetal echocardiogram today because of follow up for HLHS.     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 The ascending aorta, transverse arch, and aortic isthmus are severely hypoplastic with retrograde flow from the ductus arteriosus. The atrial septum based on 2D and Doppler is restricted; the pulmonary venous foward to reverse ratio is < 5. Mildly dilated right ventricle with normal systolic function. No hydrops.      With the help of diagrams, I reviewed the findings today with Jennifer El, her mother, and her partner. I discussed the fetal cardiac anatomy, function, physiology, and plans for intervention following delivery. I second the pediatric cardiology recommendation of delivery at [facility], with plans for post-wander echocardiogram. I reviewed the importance of a post- transthoracic echocardiogram to confirm these findings and help direct management. She had appropriate questions which I addressed. I provided her with my direct contact information for additional questions after they left.     Plan:     1. Transthoracic echocardiogram to be obtained after delivery immediately on arrival in the NICU.  2. Surgical and percutaneous intervention planning based on  anatomy and physiology     Thank you for allowing me to participate in Jennifer's care. Please do not hesitate to contact me with questions or concerns.      I spent approximately 10 minutes reviewing the chart and 30 minutes in face-to-face time reviewing the above considerations.

## 2022-09-23 NOTE — PROGRESS NOTES
Met with Jennifer, her  Allie and her mother during pre- visit with CV Surgery. Introduced single ventricle team, home monitoring plan, and reviewed principles of home monitoring. Shared parental support information, including [facility] handout.  Parents were engaged and asked appropriate questions. They were able to describe hypoplastic left heart syndrome, and had understanding of the staged approach to palliation.   Encouraged parents to reach out with questions.     I spent approximately 40 minutes in direct consultation with Jennifer and her family.

## 2022-09-23 NOTE — LETTER
Date:September 26, 2022      Patient was self referred, no letter generated. Do not send.        [facility] Health Information

## 2022-09-23 NOTE — LETTER
2022      RE: Jennifer El  [address] [place] [address]     Dear Colleague,    Thank you for the opportunity to participate in the care of your patient, Jennifer El, at the [facility] at [facility]. Please see a copy of my visit note below.    I had the pleasure of seeing Jennifer El at the [facility] on 2022 in prenatal CV surgery consultation for fetal echocardiogram results. She presented today accompanied by her  and mother. As you know, she is a 27 year old  at 27w0d who presented for fetal echocardiogram today because of follow up for HLHS.     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 The ascending aorta, transverse arch, and aortic isthmus are severely hypoplastic with retrograde flow from the ductus arteriosus. The atrial septum based on 2D and Doppler is restricted; the pulmonary venous foward to reverse ratio is < 5. Mildly dilated right ventricle with normal systolic function. No hydrops.      With the help of diagrams, I reviewed the findings today with Jennifer El, her mother, and her partner. I discussed the fetal cardiac anatomy, function, physiology, and plans for intervention following delivery. I second the pediatric cardiology recommendation of delivery at [facility], with plans for post-wander echocardiogram. I reviewed the importance of a post- transthoracic echocardiogram to confirm these findings and help direct management. She had appropriate questions which I addressed. I provided her with my direct contact information for additional questions after they left.     Plan:     1. Transthoracic echocardiogram to be obtained after delivery immediately on arrival in the NICU.  2. Surgical and percutaneous intervention planning based on  anatomy and  physiology     Thank you for allowing me to participate in Jennifer's care. Please do not hesitate to contact me with questions or concerns.      I spent approximately 10 minutes reviewing the chart and 30 minutes in face-to-face time reviewing the above considerations.      Please do not hesitate to contact me if you have any questions/concerns.     Sincerely,       Esa Bolton MD

## 2022-09-24 ENCOUNTER — HEALTH MAINTENANCE LETTER (OUTPATIENT)
Age: 27
End: 2022-09-24

## 2022-09-27 ENCOUNTER — HOSPITAL ENCOUNTER (OUTPATIENT)
Dept: ULTRASOUND IMAGING | Facility: CLINIC | Age: 27
Discharge: HOME | End: 2022-09-27
Attending: OBSTETRICS & GYNECOLOGY
Payer: MEDICAID

## 2022-09-27 ENCOUNTER — OFFICE VISIT (OUTPATIENT)
Dept: MATERNAL FETAL MEDICINE | Facility: CLINIC | Age: 27
End: 2022-09-27
Attending: OBSTETRICS & GYNECOLOGY
Payer: MEDICAID

## 2022-09-27 DIAGNOSIS — O35.BXX0 ANOMALY OF HEART OF FETUS AFFECTING PREGNANCY, ANTEPARTUM, SINGLE OR UNSPECIFIED FETUS: ICD-10-CM

## 2022-09-27 DIAGNOSIS — O35.BXX0 ANOMALY OF HEART OF FETUS AFFECTING PREGNANCY, ANTEPARTUM, SINGLE OR UNSPECIFIED FETUS: Primary | ICD-10-CM

## 2022-09-27 PROCEDURE — 76816 OB US FOLLOW-UP PER FETUS: CPT

## 2022-09-27 PROCEDURE — 76816 OB US FOLLOW-UP PER FETUS: CPT | Mod: 26 | Performed by: OBSTETRICS & GYNECOLOGY

## 2022-09-29 LAB
ABO (EXTERNAL): NORMAL
GLUCOSE (EXTERNAL): 89 MG/DL (ref 70–99)
HEMATOCRIT (EXTERNAL): 37.9 % (ref 35.4–44.4)
HEMOGLOBIN (EXTERNAL): 12.1 G/DL (ref 12–15.8)
HEPATITIS B SURFACE ANTIGEN (EXTERNAL): NONREACTIVE
HEPATITIS C ANTIBODY (EXTERNAL): NEGATIVE
HIV1+2 AB SERPL QL IA: NONREACTIVE
PLATELET COUNT (EXTERNAL): 325 10E3/UL (ref 165–415)
RH (EXTERNAL): POSITIVE
RUBELLA ANTIBODY IGG (EXTERNAL): NORMAL
TREPONEMA PALLIDUM ANTIBODY (EXTERNAL): NONREACTIVE

## 2022-09-30 ENCOUNTER — OFFICE VISIT (OUTPATIENT)
Dept: OBGYN | Facility: CLINIC | Age: 27
End: 2022-09-30
Attending: ADVANCED PRACTICE MIDWIFE
Payer: MEDICAID

## 2022-09-30 VITALS
HEIGHT: 69 IN | DIASTOLIC BLOOD PRESSURE: 85 MMHG | BODY MASS INDEX: 33.92 KG/M2 | SYSTOLIC BLOOD PRESSURE: 125 MMHG | HEART RATE: 116 BPM | WEIGHT: 229 LBS

## 2022-09-30 DIAGNOSIS — O09.93 HIGH-RISK PREGNANCY IN THIRD TRIMESTER: Primary | ICD-10-CM

## 2022-09-30 PROCEDURE — G0463 HOSPITAL OUTPT CLINIC VISIT: HCPCS

## 2022-09-30 PROCEDURE — 99207 PR PRENATAL VISIT: CPT | Performed by: ADVANCED PRACTICE MIDWIFE

## 2022-09-30 ASSESSMENT — PAIN SCALES - GENERAL: PAINLEVEL: NO PAIN (0)

## 2022-09-30 NOTE — PROGRESS NOTES
"Subjective:      27 year old  at 30w2d presentst for a routine prenatal appointment.    No vaginal bleeding or leakage of fluid.  No contractions. + fetal movement.       No HA, visual changes, RUQ or epigastric pain.     Patient concerns: Pt has no concerns today.   Feeling well overall. Pt reprots she has cut back to half-time which has helped w/ fatigue. Pt reports follow up w/ MFM Dr. Alberto for monitoring of HLHS.  Previously considered starting induction with home birth team and castellanos bulb placement.  After discussion with Dr. Alberto, now considering starting induction with [facility] CNM team and agrees with NST monitoring.      Reviewed EOB labs with patient.  Reviewed TDAP Declines today, may consider after further discussion with MFM team.  Objective:  Vitals:    22 1147   BP: 125/85   Pulse: 116   Weight: 103.9 kg (229 lb)   Height: 1.753 m (5' 9.02\")   , see ob flowsheet  Assessment/Plan     Encounter Diagnosis   Name Primary?     High-risk pregnancy in third trimester Yes         Antibody Screen   Date Value Ref Range Status   2022 Negative Negative Final   , Rhogam was not given.    - Reviewed and encouraged pt to continue follow up with Dr. Alberto and to return to clinic in 2 weeks and prn if questions or concerns.     - Reviewed total weight gain, encouraged continued healthy diet and exercise.  Reviewed importance of daily fetal kick count and why/how to contact provider.    - Reviewed why/how to contact provider if headache/visual changes/RUQ or epigastric pain, decreased fetal movement, vaginal bleeding, leakage of fluid or more than 4 contractions in an hour.       I, Reshma Anderson (Samuel), am serving as a scribe; to document services personally performed by  Ruthann Ibrahim CNM based on data collection and the provider's statements to me.     Reshma Anderson (Samuel) MS3  [facility]    I agree with the PFSH and ROS as completed by Reshma Anderson (Samuel) MS3 except for changes " made by me. The remainder of the encounter was performed by me and scribed by Reshma Anderson (Samuel) MS3. The scribed note accurately reflects my personal services and decisions made by me.   MARIO Lucas CNM

## 2022-09-30 NOTE — LETTER
Date:October 3, 2022      Provider requested that no letter be sent. Do not send.       [facility]

## 2022-09-30 NOTE — LETTER
"2022       RE: Jennifer El  [address] [facility] [address]     Dear Colleague,    Thank you for referring your patient, Jennifer El, to the [facility] [place] at . Please see a copy of my visit note below.    Subjective:      27 year old  at 30w2d presentst for a routine prenatal appointment.    No vaginal bleeding or leakage of fluid.  No contractions. + fetal movement.       No HA, visual changes, RUQ or epigastric pain.     Patient concerns: Pt has no concerns today.   Feeling well overall. Pt reprots she has cut back to half-time which has helped w/ fatigue. Pt reports follow up w/ MFM Dr. Alberto for monitoring of HLHS.  Previously considered starting induction with home birth team and castellanos bulb placement.  After discussion with Dr. Alberto, now considering starting induction with [facility] CNM team and agrees with NST monitoring.      Reviewed EOB labs with patient.  Reviewed TDAP Declines today, may consider after further discussion with MFM team.  Objective:  Vitals:    22 1147   BP: 125/85   Pulse: 116   Weight: 103.9 kg (229 lb)   Height: 1.753 m (5' 9.02\")   , see ob flowsheet  Assessment/Plan     Encounter Diagnosis   Name Primary?     High-risk pregnancy in third trimester Yes         Antibody Screen   Date Value Ref Range Status   2022 Negative Negative Final   , Rhogam was not given.    - Reviewed and encouraged pt to continue follow up with Dr. Alberto and to return to clinic in 2 weeks and prn if questions or concerns.     - Reviewed total weight gain, encouraged continued healthy diet and exercise.  Reviewed importance of daily fetal kick count and why/how to contact provider.    - Reviewed why/how to contact provider if headache/visual changes/RUQ or epigastric pain, decreased fetal movement, vaginal bleeding, leakage of fluid or more than 4 contractions in an hour.       I, " Reshma Anderson (Samuel), am serving as a scribe; to document services personally performed by  Ruthann Ibrahim CNM based on data collection and the provider's statements to me.     Reshma Anderson (Samuel) MS3  [facility]    I agree with the PFSH and ROS as completed by Reshma Anderson (Samuel) MS3 except for changes made by me. The remainder of the encounter was performed by me and scribed by Reshma Anderson (Samuel) MS3. The scribed note accurately reflects my personal services and decisions made by me.   MARIO Lucas CNM              Again, thank you for allowing me to participate in the care of your patient.      Sincerely,    MARIO Lucas CNM

## 2022-10-03 ENCOUNTER — OFFICE VISIT (OUTPATIENT)
Dept: MATERNAL FETAL MEDICINE | Facility: CLINIC | Age: 27
End: 2022-10-03
Attending: OBSTETRICS & GYNECOLOGY
Payer: COMMERCIAL

## 2022-10-03 DIAGNOSIS — O35.BXX0 ANOMALY OF HEART OF FETUS AFFECTING PREGNANCY, ANTEPARTUM, SINGLE OR UNSPECIFIED FETUS: ICD-10-CM

## 2022-10-03 PROCEDURE — G0463 HOSPITAL OUTPT CLINIC VISIT: HCPCS

## 2022-10-03 PROCEDURE — 99241 PR OFFICE CONSULTATION,LEVEL I: CPT | Performed by: PEDIATRICS

## 2022-10-04 ENCOUNTER — CARE COORDINATION (OUTPATIENT)
Dept: MATERNAL FETAL MEDICINE | Facility: CLINIC | Age: 27
End: 2022-10-04

## 2022-10-04 ENCOUNTER — CARE COORDINATION (OUTPATIENT)
Dept: CARE COORDINATION | Facility: CLINIC | Age: 27
End: 2022-10-04

## 2022-10-04 NOTE — PROGRESS NOTES
Jennifer and Allie had a [facility] NICU Consult yesterday.  This writer was unable to attend the consult and called Jennifer to explain the social work role and answer any questions parents have after yesterday's consult.  Jennifer reports her son has HLHS and is following closely with Cardiology.  Jennifer had questions about parental involvement during the NICU stay and lodging in or near the hospital. Jennifer reports this is their first baby and they are struggling to think of leaving him alone in the NICU.  We discussed ways parents can be involved during the NICU period, I.e hand hugs, [employer], sitting bedside in recliner but that parents can't sleep in the [facility] nurseries.  This writer provided information on lodging near the hospital through the [employer] group or [employer].  Family has financial resources to private pay for these rooms if needed.      Jennifer is struggling a bit with the unknowns around her son's time in NICU and transfer to CVICU.  We discussed social work role as one of advocacy and assistance managing logistics and resources as well as emotional support.  Jennifer seemed very appreciative of the information and seemed much less anxious by the end of the phone call. This writer will continue to follow this family through the remainder of the pregnancy and during the family's NICU stay.    Maria Del Rosario COLEW, MSW, LICSW  Maternal Child Health

## 2022-10-05 ENCOUNTER — DOCUMENTATION ONLY (OUTPATIENT)
Dept: MATERNAL FETAL MEDICINE | Facility: CLINIC | Age: 27
End: 2022-10-05

## 2022-10-05 DIAGNOSIS — O35.BXX0 ANOMALY OF HEART OF FETUS AFFECTING PREGNANCY, ANTEPARTUM, SINGLE OR UNSPECIFIED FETUS: Primary | ICD-10-CM

## 2022-10-05 NOTE — PROGRESS NOTES
October 4, 2022    Reviewed on call with Pediatric Genetics. Recommending cord blood array with limited g-bands and inpatient consult. Will need to meet with GC to be consented. Order placed for GC visit and PCCs alerted.     Swathi Lopez MS, [facility]  Licensed Genetic Counselor  [facility]  Maternal Fetal Medicine  [email or web address]  [phone]

## 2022-10-13 NOTE — PROGRESS NOTES
"Subjective:      27 year old  at 32w1d presents with Allie for a routine prenatal appointment.   1 hour GCT 89.  Baby has hypoplastic left heart syndrome.  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  Hx of HSV 1, Jennifer gets cold sores, no genital lesions.  Needs Hep B antibody with next blood draw, [facility].  Planning cervical ripening with [place] bulb with monitoring initially then would like to go home for 12 hours and return to continue the induction. Dr. Alberto felt this was a reasonable option as long as she returns to the [facility] once she is in labor.      Last US 22: 29/6 weeks, cephalic, normal fluid, EFW 32%, AC 35%  Has peds cardio appt 10/18/22, serial growth US, weekly BPP at 32 weeks, delivery 39-40 weeks.     No vaginal bleeding or leakage of fluid. Feeling rachana hick contractions at night and with movement. Feeling good fetal movement.      No HA, visual changes, RUQ or epigastric pain.   BPP scheduled after today's visit.  Breast pump Rx given today.    Patient concerns: Feeling well overall.   - Had concern about how long she would get with baby and skin to skin before being transferred to NICU. Was told differing options ranging from only having 60 seconds with baby versus being able to do skin to skin for a few minutes before going to NICU. Is seeking reassurance on this. Prefers to have skin to skin longer if she can  - Reviewed TDAP Declines, declined last visit but may reconsider at 36 weeks.    Objective:  Vitals:    10/14/22 1439   BP: 131/85   BP Location: Left arm   Patient Position: Sitting   Cuff Size: Adult Regular   Pulse: 114   Weight: 104.3 kg (230 lb)   Height: 1.753 m (5' 9\")   , see ob flowsheet  Assessment/Plan     Encounter Diagnoses   Name Primary?     High risk pregnancy, antepartum Yes     Anomaly of heart of fetus affecting pregnancy, antepartum, single or unspecified fetus  "     Orders Placed This Encounter   Procedures     Breast Pump Order for DME - ONLY FOR DME     No orders of the defined types were placed in this encounter.       Antibody Screen   Date Value Ref Range Status   05/27/2022 Negative Negative Final   Rhogam  was not given.    - Weekly BPPs starting at 32 weeks per [facility] recommendations. Scheduled BPP today after visit.  - Discussed NICU transfer of baby will depend on how he is doing after birth and what his needs will be at that time. Patient understanding of this.    - Reviewed total weight gain, encouraged continued healthy diet and exercise. Reviewed importance of daily fetal kick count and why/how to contact provider.    - Reviewed why/how to contact provider if headache/visual changes/RUQ or epigastric pain, decreased fetal movement, vaginal bleeding, leakage of fluid or more than 4 contractions in an hour.     Patient education/orders or handouts today:  PTL signs/symptoms and TDAP  Reviewed GBS screening at 35-36 wks, patient agreeable.    Return to clinic in 2 weeks and prn if questions or concerns.     Cynthia Fields CNM    I, Genevieve Gamble RN, JOZEF-Student am serving as a scribe; to document services personally performed by Cynthia Fields CNM based on data collection and the provider's statements to me.     Genevieve Gamble RN, JOZEF-Student  I, Cynthia Fields, was present with the medical/ALICIA student who participated in the service and in the documentation of the note.  I have verified the history and personally performed the physical exam and medical decision making.  I agree with the assessment and plan of care as documented in the note.    MARIO Michelle CNM

## 2022-10-14 ENCOUNTER — PRENATAL OFFICE VISIT (OUTPATIENT)
Dept: OBGYN | Facility: CLINIC | Age: 27
End: 2022-10-14
Attending: ADVANCED PRACTICE MIDWIFE
Payer: COMMERCIAL

## 2022-10-14 ENCOUNTER — OFFICE VISIT (OUTPATIENT)
Dept: MATERNAL FETAL MEDICINE | Facility: CLINIC | Age: 27
End: 2022-10-14
Attending: OBSTETRICS & GYNECOLOGY
Payer: COMMERCIAL

## 2022-10-14 ENCOUNTER — HOSPITAL ENCOUNTER (OUTPATIENT)
Dept: ULTRASOUND IMAGING | Facility: CLINIC | Age: 27
Discharge: HOME | End: 2022-10-14
Attending: OBSTETRICS & GYNECOLOGY
Payer: COMMERCIAL

## 2022-10-14 VITALS
DIASTOLIC BLOOD PRESSURE: 85 MMHG | SYSTOLIC BLOOD PRESSURE: 131 MMHG | BODY MASS INDEX: 34.07 KG/M2 | WEIGHT: 230 LBS | HEIGHT: 69 IN | HEART RATE: 114 BPM

## 2022-10-14 DIAGNOSIS — O35.BXX0 ANOMALY OF HEART OF FETUS AFFECTING PREGNANCY, ANTEPARTUM, SINGLE OR UNSPECIFIED FETUS: ICD-10-CM

## 2022-10-14 DIAGNOSIS — O35.BXX0 ANOMALY OF HEART OF FETUS AFFECTING PREGNANCY, ANTEPARTUM, SINGLE OR UNSPECIFIED FETUS: Primary | ICD-10-CM

## 2022-10-14 DIAGNOSIS — O09.90 HIGH RISK PREGNANCY, ANTEPARTUM: Primary | ICD-10-CM

## 2022-10-14 PROCEDURE — G0463 HOSPITAL OUTPT CLINIC VISIT: HCPCS

## 2022-10-14 PROCEDURE — 76819 FETAL BIOPHYS PROFIL W/O NST: CPT | Mod: 26 | Performed by: OBSTETRICS & GYNECOLOGY

## 2022-10-14 PROCEDURE — 76819 FETAL BIOPHYS PROFIL W/O NST: CPT

## 2022-10-14 PROCEDURE — 99207 PR PRENATAL VISIT: CPT | Performed by: ADVANCED PRACTICE MIDWIFE

## 2022-10-17 ENCOUNTER — LAB REQUISITION (OUTPATIENT)
Dept: LAB | Facility: CLINIC | Age: 27
End: 2022-10-17
Payer: COMMERCIAL

## 2022-10-17 DIAGNOSIS — Z34.03 ENCOUNTER FOR SUPERVISION OF NORMAL FIRST PREGNANCY, THIRD TRIMESTER: ICD-10-CM

## 2022-10-17 PROCEDURE — 86706 HEP B SURFACE ANTIBODY: CPT | Mod: ORL | Performed by: MIDWIFE

## 2022-10-17 PROCEDURE — 84144 ASSAY OF PROGESTERONE: CPT | Mod: ORL | Performed by: MIDWIFE

## 2022-10-18 ENCOUNTER — HOSPITAL ENCOUNTER (OUTPATIENT)
Dept: CARDIOLOGY | Facility: CLINIC | Age: 27
Discharge: HOME | End: 2022-10-18
Attending: PEDIATRICS
Payer: COMMERCIAL

## 2022-10-18 ENCOUNTER — OFFICE VISIT (OUTPATIENT)
Dept: CARDIOLOGY | Facility: CLINIC | Age: 27
End: 2022-10-18

## 2022-10-18 ENCOUNTER — OFFICE VISIT (OUTPATIENT)
Dept: MATERNAL FETAL MEDICINE | Facility: CLINIC | Age: 27
End: 2022-10-18
Attending: OBSTETRICS & GYNECOLOGY
Payer: COMMERCIAL

## 2022-10-18 ENCOUNTER — HOSPITAL ENCOUNTER (OUTPATIENT)
Dept: ULTRASOUND IMAGING | Facility: CLINIC | Age: 27
Discharge: HOME | End: 2022-10-18
Attending: OBSTETRICS & GYNECOLOGY
Payer: COMMERCIAL

## 2022-10-18 ENCOUNTER — TELEPHONE (OUTPATIENT)
Dept: MATERNAL FETAL MEDICINE | Facility: CLINIC | Age: 27
End: 2022-10-18

## 2022-10-18 DIAGNOSIS — O35.BXX0 FETAL CARDIAC DISEASE AFFECTING PREGNANCY, SINGLE OR UNSPECIFIED FETUS: Primary | ICD-10-CM

## 2022-10-18 DIAGNOSIS — O35.BXX0: ICD-10-CM

## 2022-10-18 DIAGNOSIS — O35.BXX0 ANOMALY OF HEART OF FETUS AFFECTING PREGNANCY, ANTEPARTUM, SINGLE OR UNSPECIFIED FETUS: Primary | ICD-10-CM

## 2022-10-18 DIAGNOSIS — O35.BXX0 ANOMALY OF HEART OF FETUS AFFECTING PREGNANCY, ANTEPARTUM, SINGLE OR UNSPECIFIED FETUS: ICD-10-CM

## 2022-10-18 LAB
HBV SURFACE AB SERPL IA-ACNC: 399.62 M[IU]/ML
HBV SURFACE AB SERPL IA-ACNC: REACTIVE M[IU]/ML
PROGEST SERPL-MCNC: >60 NG/ML

## 2022-10-18 PROCEDURE — 99215 OFFICE O/P EST HI 40 MIN: CPT | Mod: 25 | Performed by: PEDIATRICS

## 2022-10-18 PROCEDURE — 76819 FETAL BIOPHYS PROFIL W/O NST: CPT | Mod: 26 | Performed by: OBSTETRICS & GYNECOLOGY

## 2022-10-18 PROCEDURE — 76828 ECHO EXAM OF FETAL HEART: CPT | Mod: 26 | Performed by: PEDIATRICS

## 2022-10-18 PROCEDURE — 76819 FETAL BIOPHYS PROFIL W/O NST: CPT

## 2022-10-18 PROCEDURE — 93325 DOPPLER ECHO COLOR FLOW MAPG: CPT

## 2022-10-18 PROCEDURE — 93325 DOPPLER ECHO COLOR FLOW MAPG: CPT | Mod: 26 | Performed by: PEDIATRICS

## 2022-10-18 PROCEDURE — 76826 ECHO EXAM OF FETAL HEART: CPT | Mod: 26 | Performed by: PEDIATRICS

## 2022-10-18 NOTE — PROGRESS NOTES
[facility]   Heart Center Fetal Consult Note    Patient:  Jennifer El MRN:  [id number]   YOB: 1995 Age:  27 year old   Date of Visit:  10/18/2022 PCP:  No Ref-Primary, Physician     Dear Doctor,     I had the pleasure of seeing Jennifer El at the [facility] on 10/18/2022 in fetal cardiology consultation for ongoing fetal management. She presented today accompanied by her  and mother. 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    I performed and interpreted the fetal echocardiogram today, which demonstrated hypoplsastic left heart syndrome - MS/AA. The ascending aorta, transverse arch, and aortic isthmus are severely hypoplastic with retrograde flow from the ductus arteriosus. No flow is documented through the atrial septum. The pulmonary venous foward to reverse ratio is 1.8. Mildly dilated right ventricle with normal systolic function. No hydrops.     With the help of diagrams, I reviewed the echo findings today with Jennifer El, mother, and her partner. I discussed the new finding of an intact atrial septum with HLHS disease. This is very high risk, and it can affect the lung development of the fetus in utero. At this time, the fetus is showing no signs of distress, so we will continue to plan for term delivery. It will be a scheduled C/S. We will discuss as a cardiology and OB team the best plan for delivery with immediate transfer for procedure to open the atrial septum. After the initial procedure, we will than assess the baby and determine the next steps.  I reviewed the importance of a post-wander transthoracic echocardiogram to confirm these findings and help direct management. She had appropriate questions which I addressed. I provided her with my direct contact information for additional  questions after they left.    Plan:    1. No follow up fetal echocardiogram.   2. Recommend twice weekly hydrops checks with MFM team.   3. We will discuss as a team the best delivery plan and initial post-wander management of the fetus.     I discussed the above plan with Dr. Alberto.     Thank you for allowing me to participate in Jennifer's care. Please do not hesitate to contact me with questions or concerns.    This visit was separate from the performance and interpretation of the ultrasound. The majority of the time (>50%) was spent in counseling and coordination of care. I spent approximately 50 minutes in face-to-face time reviewing the above considerations.    Fernando Matson M.D.  Pediatric Cardiology  [facility]'s [address], 5th floor, [place] [address]  Phone [phone]  Fax [phone]

## 2022-10-18 NOTE — TELEPHONE ENCOUNTER
Phone call to Jennifer to schedule follow up [facility] ultrasounds. Pt saw peds cardiology today. Dr. Alberto recommends hydrops checks with BPP 2x/week. Pt will come to [facility] on Friday 10/21 for hydrops check, BPP. [facility] Wednesday. Ok to keep this visit then start M/Th visits the following week.    Will discuss transfer of prenatal care to [facility] at Friday's visit.     Antonietta Ivey RN

## 2022-10-18 NOTE — PROGRESS NOTES
Please see full imaging report from ViewPoint program under imaging tab.    Savi Cortes MD  Maternal Fetal Medicine

## 2022-10-19 DIAGNOSIS — O35.BXX0 ANOMALY OF HEART OF FETUS AFFECTING PREGNANCY, ANTEPARTUM, SINGLE OR UNSPECIFIED FETUS: Primary | ICD-10-CM

## 2022-10-21 ENCOUNTER — OFFICE VISIT (OUTPATIENT)
Dept: MATERNAL FETAL MEDICINE | Facility: CLINIC | Age: 27
End: 2022-10-21
Attending: OBSTETRICS & GYNECOLOGY
Payer: COMMERCIAL

## 2022-10-21 ENCOUNTER — HOSPITAL ENCOUNTER (OUTPATIENT)
Dept: ULTRASOUND IMAGING | Facility: CLINIC | Age: 27
Discharge: HOME | End: 2022-10-21
Attending: OBSTETRICS & GYNECOLOGY
Payer: COMMERCIAL

## 2022-10-21 DIAGNOSIS — O35.BXX0 ANOMALY OF HEART OF FETUS AFFECTING PREGNANCY, ANTEPARTUM, SINGLE OR UNSPECIFIED FETUS: Primary | ICD-10-CM

## 2022-10-21 DIAGNOSIS — O35.BXX0 ANOMALY OF HEART OF FETUS AFFECTING PREGNANCY, ANTEPARTUM, SINGLE OR UNSPECIFIED FETUS: ICD-10-CM

## 2022-10-21 DIAGNOSIS — O09.90 HIGH RISK PREGNANCY, ANTEPARTUM: Primary | ICD-10-CM

## 2022-10-21 PROCEDURE — 76819 FETAL BIOPHYS PROFIL W/O NST: CPT | Mod: 26 | Performed by: OBSTETRICS & GYNECOLOGY

## 2022-10-21 PROCEDURE — 76816 OB US FOLLOW-UP PER FETUS: CPT | Mod: 26 | Performed by: OBSTETRICS & GYNECOLOGY

## 2022-10-21 PROCEDURE — 76819 FETAL BIOPHYS PROFIL W/O NST: CPT

## 2022-10-21 PROCEDURE — 76816 OB US FOLLOW-UP PER FETUS: CPT

## 2022-10-21 NOTE — PROGRESS NOTES
Please refer to ultrasound report under 'Imaging' Studies of 'Chart Review' tabs.    Олег Bates M.D.

## 2022-10-25 ENCOUNTER — DOCUMENTATION ONLY (OUTPATIENT)
Dept: PEDIATRIC CARDIOLOGY | Facility: CLINIC | Age: 27
End: 2022-10-25

## 2022-10-25 NOTE — PROGRESS NOTES
[facility] Heart Center Disposition Conference Note    Patient:  Jennifer El MRN:  [id number]   Surgeon:  : 1995   Primary Card: Dr. Matson  Age:  27 year old   Date of Discussion:  Oct 28, 2022 PCP:  No Ref-Primary, Physician     HPI: Jennifer is a 27 year old  female at 34w2d gestation with fetal dianosis of hypoplastic left heart syndrome (MS/AA) with restrictive atrial septum, and severe hypoplasia of the ascending aorta, transverse arch, and aortic isthmus. On her most resent fetal echo (10/18/22) there was no flow is documented through the atrial septum. No hydrops. At this time, the fetus is showing no signs of distress, current plan for term delivery via scheduled C/S. She is being presented for discussion regarding intervention and delivery planning due to the high risk nature of this heart lesion.      Cardiac Diagnoses:  1. HLHS (MS/AA)  o No atrial level shunt   o Pulmonary venous foward to reverse ratio is 1.8.  2. Severely hypoplastic ascending aorta, transverse arch, and aortic isthmus  o Retrograde flow from PDA    Previous Cardiac Surgeries/Catheterizations: None      Medications:   Current Outpatient Medications   Medication Instructions     Doxylamine Succinate, Sleep, (UNISOM PO) 1 tablet, Oral, DAILY     Ferrous Sulfate (IRON SUPPLEMENT PO) 1 tablet, Oral, daily     magnesium citrate solution (NOT currently available) 296 mLs, Oral, ONCE     ondansetron (ZOFRAN) 8 mg, 3 TIMES DAILY PRN     Prenatal Vit-Fe Fumarate-FA (PRENATAL VITAMIN AND MINERAL) 28-0.8 MG TABS Oral     progesterone (PROMETRIUM) 200 mg, Oral, 2 TIMES DAILY     Vitamin D (Cholecalciferol) 4,000 Units, Oral, DAILY       Allergies:  No Known Allergies    Imaging/Studies:    [facility] Comprehensive US (10/21/22):   Patient here for a fetal growth scan secondary to a diagnosis of HLVS with intact septum. She is at 33w2d gestational age. Active single fetus with behavior appropriate for gestational age.  Appropriate interval fetal  growth. Estimated fetal weight is appropriate for gestational age.  None of the anomalies commonly detected by ultrasound were evident in the limited fetal anatomic survey described above. Normal amniotic fluid volume. No evidence for hydrops.  BPP (performed for fetal CHD) is reassuring.    Fetal Echocardiogram 32+6 weeks Gestation (10/18/22):   Hypoplastic left heart syndrome - MS/AA. The ascending aorta, transverse arch, and aortic isthmus are severely hypoplastic with retrograde flow from the ductus arteriosus. No flow is documented across the atrial septum. The pulmonary venous foward to reverse ratio is 1.8. Mildly dilated right ventricle with normal systolic function. No hydrops. I have reviewed the cardiac anatomy, physiology and intervention needed after birth extensively.  neonatology consultation and attendance at delivery is recommended.  cardiothoracic surgery consultation is recommended. The fetal cardiac condition is ductal dependent and prostaglandin therapy should be commenced immediately after delivery. Delivery is recommended at [facility]. Cardiology consultation and echocardiogram  is recommended immediately after delivery.    Fetal Echocardiogram 27 Weeks Gestation (22):   Hypoplsastic left heart syndrome MS/AA vs. MA/AA. The ascending aorta, transverse arch, and aortic isthmus are severely hypoplastic with retrograde flow from the ductus arteriosus. The atrial septum based on 2D and Doppler is restricted; the pulmonary venous foward to reverse ratio is < 5. Mildly dilated right ventricle with normal systolic function. No hydrops. I have reviewed the cardiac anatomy, physiology and intervention needed after birth extensively. A follow-up fetal echocardiogram is recommended at 32-34 weeks gestation.  neonatology consultation and attendance at delivery is recommended.  cardiothoracic surgery consultation is recommended. The fetal cardiac condition  is ductal dependent and prostaglandin therapy should be commenced immediately after delivery. Delivery is recommended at [facility]. Cardiology consultation and echocardiogram is recommended immediately after delivery.      Pertinent Labs: None  Hematologic Issues: None  Current access/access Issues: None  Anesthesia Issues: None    Discussion (10/28/22): HLH--MSAA. Premature closure of PFO. Delivery in OR with immediate cath to stent atrial septum. Need delivery MWF for necessary personnel. ECMO bailout. --JLB     Prepared By: JEREMIAH 10/25/22      Present for discussion:       Cardiology   Administration   Radiology     Dr. Myles Akbar    X Dr. Surinder Fernandez    X Dr. Osmar Leon   Surgery        X Dr. Nely Bolton   Anesthesia    X Dr Daniel Rivera  X Dr. Jarrell Cartwright    X Dr. Lissy Singleton    Critical Care  X Dr. Александр Hicks    X Dr. Lucinda Solorzano  X Dr. Kofi Newby  X Dr. Kezia Marquez    X Dr. Gee Melara    X Dr. Shanti Narasimhan Dr. Sameer Gupta Dr. Nathan Rodgers Dr. Janet Hume   Neonatology    X Dr. Kiko Corrigan  X Dr. Rafael Wilson  X Dr. Bhavana Dillon    X Dr. Fernando Pina  X Dr. Jeannie Rowley   Perfusion    X Dr. Juanis Adames  X Diallo Troo                                     [facility]      Physical Exam

## 2022-10-26 ENCOUNTER — ANCILLARY PROCEDURE (OUTPATIENT)
Dept: ULTRASOUND IMAGING | Facility: HOSPITAL | Age: 27
End: 2022-10-26
Attending: OBSTETRICS & GYNECOLOGY
Payer: COMMERCIAL

## 2022-10-26 ENCOUNTER — OFFICE VISIT (OUTPATIENT)
Dept: MATERNAL FETAL MEDICINE | Facility: HOSPITAL | Age: 27
End: 2022-10-26
Attending: OBSTETRICS & GYNECOLOGY
Payer: COMMERCIAL

## 2022-10-26 DIAGNOSIS — O35.BXX0 ANOMALY OF HEART OF FETUS AFFECTING PREGNANCY, ANTEPARTUM, SINGLE OR UNSPECIFIED FETUS: Primary | ICD-10-CM

## 2022-10-26 DIAGNOSIS — O35.BXX0 ANOMALY OF HEART OF FETUS AFFECTING PREGNANCY, ANTEPARTUM, SINGLE OR UNSPECIFIED FETUS: ICD-10-CM

## 2022-10-26 PROCEDURE — 76816 OB US FOLLOW-UP PER FETUS: CPT | Mod: 26 | Performed by: OBSTETRICS & GYNECOLOGY

## 2022-10-26 PROCEDURE — 99207 PR NO CHARGE LOS: CPT | Performed by: OBSTETRICS & GYNECOLOGY

## 2022-10-26 PROCEDURE — 76819 FETAL BIOPHYS PROFIL W/O NST: CPT | Mod: 26 | Performed by: OBSTETRICS & GYNECOLOGY

## 2022-10-26 PROCEDURE — 999N000069 HC STATISTIC GENETIC COUNSELING, < 16 MIN: Performed by: GENETIC COUNSELOR, MS

## 2022-10-26 PROCEDURE — 76816 OB US FOLLOW-UP PER FETUS: CPT

## 2022-10-26 NOTE — PROGRESS NOTES
[facility]  Genetic Counseling Consult    Patient: Jennifer El YOB: 1995   Date of Service: 10/26/22      Jennifer El was seen at [facility] for genetic consultation to discuss recommendations for  genetic testing and evaluations. The indication for genetics is fetal hypoplastic left heart syndrome.       Impression/Plan:   1.  Jennifer  consented for genetic testing to be performed on her child after delivery.  The consent was scanned into her medical record and details for testing were added to Jennifer 's delivery plans.    2.  Recommendations for  genetics evaluation per pediatric genetics are SNP array with limited gbands on umbilical cord blood and an inpatient genetics evaluation.    Discussion:     After a brief review of Jennifer 's prenatal history and recommendations from pediatric genetics, Jennifer provied consent for SNP array on cord blood to be collected at delivery, as well as an inpatient genetics consult to be done after delivery. Orders are signed and held in the fetal chart for admission.  Specimen requirements for [id number] are 5 mL of cord blood in a green top tube and 5mL of cord blood in a purple top tube. We also briefly reviewed that there are other individual genes that have some evidence for association with HLHS, and that a chromosome microarray would not be expected to provide results on single gene disorders/variants.  Further genetic testing may be recommended at a later time by her care team.  We discussed that in any future pregnancy, recurrence risks for congenital heart defects in general, not just HLHS, will be increased over the general population,and that close ultrasound surveillance will be recommended.  The consent for genetic testing was signed at today's visit and is scanned in the Violet 's chart     It was a pleasure to be involved with Jennifer 's care.  Face-to-face time of the meeting was 15 minutes.       Sebastien Perez MS, [facility]  Licensed Genetic Counselor  Phone: [phone]  Pager: [phone]

## 2022-10-31 ENCOUNTER — OFFICE VISIT (OUTPATIENT)
Dept: MATERNAL FETAL MEDICINE | Facility: CLINIC | Age: 27
End: 2022-10-31
Attending: OBSTETRICS & GYNECOLOGY
Payer: COMMERCIAL

## 2022-10-31 ENCOUNTER — HOSPITAL ENCOUNTER (OUTPATIENT)
Dept: ULTRASOUND IMAGING | Facility: CLINIC | Age: 27
Discharge: HOME | End: 2022-10-31
Attending: OBSTETRICS & GYNECOLOGY
Payer: COMMERCIAL

## 2022-10-31 ENCOUNTER — APPOINTMENT (OUTPATIENT)
Dept: LAB | Facility: CLINIC | Age: 27
End: 2022-10-31
Attending: OBSTETRICS & GYNECOLOGY
Payer: COMMERCIAL

## 2022-10-31 VITALS
BODY MASS INDEX: 36.62 KG/M2 | DIASTOLIC BLOOD PRESSURE: 97 MMHG | WEIGHT: 248 LBS | SYSTOLIC BLOOD PRESSURE: 151 MMHG | RESPIRATION RATE: 18 BRPM | OXYGEN SATURATION: 97 % | HEART RATE: 88 BPM

## 2022-10-31 DIAGNOSIS — O09.90 HIGH RISK PREGNANCY, ANTEPARTUM: Primary | ICD-10-CM

## 2022-10-31 DIAGNOSIS — O35.BXX0 ANOMALY OF HEART OF FETUS AFFECTING PREGNANCY, ANTEPARTUM, SINGLE OR UNSPECIFIED FETUS: ICD-10-CM

## 2022-10-31 LAB
ALT SERPL W P-5'-P-CCNC: 20 U/L (ref 0–50)
AST SERPL W P-5'-P-CCNC: 16 U/L (ref 0–45)
CREAT SERPL-MCNC: 0.64 MG/DL (ref 0.52–1.04)
CREAT UR-MCNC: 20 MG/DL
ERYTHROCYTE [DISTWIDTH] IN BLOOD BY AUTOMATED COUNT: 12.7 % (ref 10–15)
GFR SERPL CREATININE-BSD FRML MDRD: >90 ML/MIN/1.73M2
HCT VFR BLD AUTO: 36.5 % (ref 35–47)
HGB BLD-MCNC: 12.3 G/DL (ref 11.7–15.7)
MCH RBC QN AUTO: 29 PG (ref 26.5–33)
MCHC RBC AUTO-ENTMCNC: 33.7 G/DL (ref 31.5–36.5)
MCV RBC AUTO: 86 FL (ref 78–100)
PLATELET # BLD AUTO: 312 10E3/UL (ref 150–450)
PROT UR-MCNC: <0.05 G/L
PROT/CREAT 24H UR: NORMAL MG/G{CREAT}
RBC # BLD AUTO: 4.24 10E6/UL (ref 3.8–5.2)
WBC # BLD AUTO: 17.4 10E3/UL (ref 4–11)

## 2022-10-31 PROCEDURE — 36415 COLL VENOUS BLD VENIPUNCTURE: CPT | Performed by: OBSTETRICS & GYNECOLOGY

## 2022-10-31 PROCEDURE — 76816 OB US FOLLOW-UP PER FETUS: CPT | Mod: 26 | Performed by: OBSTETRICS & GYNECOLOGY

## 2022-10-31 PROCEDURE — 85014 HEMATOCRIT: CPT | Performed by: OBSTETRICS & GYNECOLOGY

## 2022-10-31 PROCEDURE — 76819 FETAL BIOPHYS PROFIL W/O NST: CPT

## 2022-10-31 PROCEDURE — 99214 OFFICE O/P EST MOD 30 MIN: CPT | Mod: 25 | Performed by: OBSTETRICS & GYNECOLOGY

## 2022-10-31 PROCEDURE — 76816 OB US FOLLOW-UP PER FETUS: CPT

## 2022-10-31 PROCEDURE — 84450 TRANSFERASE (AST) (SGOT): CPT | Performed by: OBSTETRICS & GYNECOLOGY

## 2022-10-31 PROCEDURE — G0463 HOSPITAL OUTPT CLINIC VISIT: HCPCS | Mod: 25

## 2022-10-31 PROCEDURE — 82565 ASSAY OF CREATININE: CPT | Performed by: OBSTETRICS & GYNECOLOGY

## 2022-10-31 PROCEDURE — 90715 TDAP VACCINE 7 YRS/> IM: CPT

## 2022-10-31 PROCEDURE — 84460 ALANINE AMINO (ALT) (SGPT): CPT | Performed by: OBSTETRICS & GYNECOLOGY

## 2022-10-31 PROCEDURE — 84156 ASSAY OF PROTEIN URINE: CPT | Performed by: OBSTETRICS & GYNECOLOGY

## 2022-10-31 PROCEDURE — 250N000011 HC RX IP 250 OP 636

## 2022-10-31 PROCEDURE — 90471 IMMUNIZATION ADMIN: CPT

## 2022-10-31 ASSESSMENT — PATIENT HEALTH QUESTIONNAIRE - PHQ9: SUM OF ALL RESPONSES TO PHQ QUESTIONS 1-9: 2

## 2022-10-31 ASSESSMENT — PAIN SCALES - GENERAL: PAINLEVEL: NO PAIN (0)

## 2022-10-31 NOTE — NURSING NOTE
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 VS done, see flowsheet, BP elevated plan for labs today. Jennifer reports she takes her BP regularly at home and its usually 110-120/70-80. Pt reports positive fetal movement. Pt denies bldg/lof/change in discharge/contractions/headache/vision changes/chest pain/SOB. Jennifer reports recently had a cold and is feeling better now. PT also reports \"kankles\" in her BL lower extremities.  +1 edema noted up to knees, pt is wearing compression stockings.  New OB folder information and teaching sheets given and reviewed. PT agreeable to TDAP vaccine, see immunization record for documentation. Dr. Alberto reviewed US and met with pt, OBV done, see separate note. Plan for fetal MRI on 11/2, continue 2x weekly hydrops/BPP, OBV in 1 week. Will also recheck BP on 11/3. Future visits previously scheduled. Pt discharged stable and ambulatory.    Lou Beck RN    "

## 2022-10-31 NOTE — PROGRESS NOTES
Maternal-Fetal Medicine First OB Visit    Jennifer El  : 1995  MRN: [id number]    REFERRAL:  Jennifer El is a 27 year old sent by Imelda Kahn    HPI:  Jennifer El is a 27 year old  at 34w5d by LMP consistent with 5w6d ultrasound here to establish care with [facility] in anticipation of delivery at [facility]. Violet's pregnancy is complicated by fetal hypoplastic left heart syndrome (HLHS), now with intact septum.    Notes swelling in the lower extremities, and has now started wearing erica stockings. No headaches, visual changes, or epigastric pain. Checking blood pressure at home and has been within the normal range. Regular fetal movement. No regular contractions, leakage of fluid or bleeding.    Pregnancy complicated by:  1) Fetal Hypoplastic Left Heart Syndrome with intact atrial septum    Prenatal Care:  Primary OB care this pregnancy was initially with [facility]. She subsequently was followed by the [facility] midwives given planned delivery at [facility]. She is transferring OB care to [facility] clinic today given plan for primary  section.    Obstetrics History:  OB History    Para Term  AB Living   1 0 0 0 0 0   SAB IAB Ectopic Multiple Live Births   0 0 0 0 0      # Outcome Date GA Lbr Yeison/2nd Weight Sex Delivery Anes PTL Lv   1 Current              Past Medical History:  Past Medical History:   Diagnosis Date     Migraines      Past Surgical History:  None    Current Medications:  Prior to Admission medications    Medication Sig Last Dose Taking? Auth Provider Long Term End Date   Doxylamine Succinate, Sleep, (UNISOM PO) Take 1 tablet by mouth daily   Reported, Patient     Ferrous Sulfate (IRON SUPPLEMENT PO) Take 1 tablet by mouth daily   Reported, Patient     magnesium citrate solution (NOT currently available) Take 296 mLs by mouth once   Reported, Patient     ondansetron (ZOFRAN) 8 MG tablet 8 mg 3 times daily as needed   Reported, Patient      Prenatal Vit-Fe Fumarate-FA (PRENATAL VITAMIN AND MINERAL) 28-0.8 MG TABS    Reported, Patient     progesterone (PROMETRIUM) 200 MG capsule Take 200 mg by mouth 2 times daily   Reported, Patient     Vitamin D (Cholecalciferol) 25 MCG (1000 UT) TABS Take 4,000 Units by mouth daily   Reported, Patient       Allergies:  Patient has no known allergies.    Social History:   Social History     Socioeconomic History     Marital status:      Spouse name: Allie     Number of children: Not on file     Years of education: Not on file     Highest education level: Not on file   Occupational History     Not on file   Tobacco Use     Smoking status: Never     Smokeless tobacco: Never   Vaping Use     Vaping Use: Never used   Substance and Sexual Activity     Alcohol use: Not Currently     Drug use: Never     Sexual activity: Yes     Partners: Male     Birth control/protection: None   Other Topics Concern     Not on file   Social History Narrative     Not on file     Social Determinants of Health     Financial Resource Strain: Not on file   Food Insecurity: Not on file   Transportation Needs: Not on file   Physical Activity: Not on file   Stress: Not on file   Social Connections: Not on file   Intimate Partner Violence: Not on file   Housing Stability: Not on file     Family History:  Family History   Adopted: Yes   Problem Relation Age of Onset     Unknown/Adopted No family hx of      ROS:  10-point ROS negative except as in HPI     PHYSICAL EXAM:  BP (!) 151/97 (BP Location: Left arm, Patient Position: Sitting, Cuff Size: Adult Large)   Pulse 88   Resp 18   Wt 112.5 kg (248 lb)   LMP 03/02/2022   SpO2 97%   BMI 36.62 kg/m    Gen: NAD, well appearing  Chest: Non-labored breathing  Abdomen: gravid  Extremities: 2+ pedal edema    Labs:   Lab Results   Component Value Date    AS Negative 05/27/2022    HEPBANG Nonreactive 05/27/2022    HGB 11.9 09/06/2022     GBS Status:   No results found for: GBS    Genetic Testing:  "  Normal cell-free DNA screening    Ultrasounds:   Please see the imaging tab for details of the ultrasound performed today.    ASSESSMENT and PLAN:  27 year old y.o.  at 34w5d by LMP, consistent with 5w6d ultrasound with pregnancy complicated by HLHS with intact atrial septum    # Fetal hypoplastic left heart syndrome with intact atrial septum  -Low risk cell-free DNA screening  -Planning SNP array with limited gbands on umbilical cord blood and an inpatient genetics evaluation  -S/p Neonatology, Pediatric Cardiology and CV surgery consultations  -Primary  section at term in the main OR with Neonatology, Pediatric Cardiology and CV surgery to facilitate immediate treatment of the . Awaiting confirmation from Pediatric Cardiology regarding date, but potentially considering 2022.  -Desire to have Mom and  present at the time of delivery for support, will discuss with anesthesiology once surgery is scheduled  -Fetal MRI scheduled for 2022 to evaluate for \"Nutmeg\" lung; plan to review with Dr. Matson and per Rodriguez to review the results  -Twice weekly BPP with hydrops assessment  -Every 4 week assessments of fetal growth    # Elevated blood pressure  Blood pressure elevated in clinic today. No signs and symptoms of preeclampsia. Blood pressures have been normal at home. Does not meet criteria for hypertensive disorder pregnancy at this time, but will monitor closely.  -Draw preeclampsia labs today (reviewed and all normal)  -Repeat blood pressure in clinic on Thursday    #Routine PNC:  - Prenatal labs:   Rh: +  antibody: neg               HepB/HIV/RPR/HepC: nonreactive               UC: no growth               Rubella: immune                 GCT: pass    - Pap smear: need to discuss when last completed  - Immunizations:  TDaP today  - Genetic screening: low-risk NIPT  - GBS to be collected next week    Joleen Alberto MD  Specialist in Maternal-Fetal Medicine    2022 , " 8:26 AM       I spent 3 minutes prior to the visit preparing to see the patient (reviewing medical records and tests).  I spent 20 minutes face-face-to-face with the patient during the visit with the majority (>50%) spent on counseling and coordination of care with the patient and/or family members.  I spent 8 minutes after the visit with the patient documenting the visit in the electronic health record and/or communicating with other health care professionals and/or care coordination.      Total time spent on today's date of service: 31 minutes.

## 2022-11-02 ENCOUNTER — HOSPITAL ENCOUNTER (OUTPATIENT)
Dept: MRI IMAGING | Facility: CLINIC | Age: 27
Discharge: HOME | End: 2022-11-02
Attending: OBSTETRICS & GYNECOLOGY | Admitting: OBSTETRICS & GYNECOLOGY
Payer: COMMERCIAL

## 2022-11-02 ENCOUNTER — TELEPHONE (OUTPATIENT)
Dept: MATERNAL FETAL MEDICINE | Facility: CLINIC | Age: 27
End: 2022-11-02

## 2022-11-02 DIAGNOSIS — O09.90 HIGH RISK PREGNANCY, ANTEPARTUM: ICD-10-CM

## 2022-11-02 DIAGNOSIS — O35.BXX0 ANOMALY OF HEART OF FETUS AFFECTING PREGNANCY, ANTEPARTUM, SINGLE OR UNSPECIFIED FETUS: ICD-10-CM

## 2022-11-02 PROCEDURE — 74712 MRI FETAL SNGL/1ST GESTATION: CPT

## 2022-11-02 PROCEDURE — 74712 MRI FETAL SNGL/1ST GESTATION: CPT | Mod: 26 | Performed by: RADIOLOGY

## 2022-11-02 NOTE — TELEPHONE ENCOUNTER
I called and reviewed the MRI results with Titi. Discussed with Dr. Matson. Planning to review at [facility] conference tomorrow and will finalize delivery date and details soon.     Blood pressures have been normal at home.    Follow up in the office tomorrow for BPP/hydrops check, and blood pressure check.    Joleen Alberto MD  Specialist in Maternal-Fetal Medicine

## 2022-11-03 ENCOUNTER — OFFICE VISIT (OUTPATIENT)
Dept: MATERNAL FETAL MEDICINE | Facility: CLINIC | Age: 27
End: 2022-11-03
Attending: OBSTETRICS & GYNECOLOGY
Payer: COMMERCIAL

## 2022-11-03 ENCOUNTER — HOSPITAL ENCOUNTER (OUTPATIENT)
Dept: ULTRASOUND IMAGING | Facility: CLINIC | Age: 27
Discharge: HOME | End: 2022-11-03
Attending: OBSTETRICS & GYNECOLOGY
Payer: COMMERCIAL

## 2022-11-03 VITALS
DIASTOLIC BLOOD PRESSURE: 90 MMHG | OXYGEN SATURATION: 98 % | SYSTOLIC BLOOD PRESSURE: 139 MMHG | HEART RATE: 84 BPM | RESPIRATION RATE: 18 BRPM

## 2022-11-03 DIAGNOSIS — O35.BXX0 ANOMALY OF HEART OF FETUS AFFECTING PREGNANCY, ANTEPARTUM, SINGLE OR UNSPECIFIED FETUS: ICD-10-CM

## 2022-11-03 DIAGNOSIS — O35.BXX0 ANOMALY OF HEART OF FETUS AFFECTING PREGNANCY, ANTEPARTUM, SINGLE OR UNSPECIFIED FETUS: Primary | ICD-10-CM

## 2022-11-03 PROCEDURE — 76819 FETAL BIOPHYS PROFIL W/O NST: CPT

## 2022-11-03 PROCEDURE — 76816 OB US FOLLOW-UP PER FETUS: CPT | Mod: 26 | Performed by: OBSTETRICS & GYNECOLOGY

## 2022-11-03 NOTE — NURSING NOTE
Jennifer seen in clinic today for hydrops check, BPP, and BP check at 35w1d gestation due to pregnancy c/b fetal CHD (see report/notes). Pt reports + FM. BP readings today (see VS).  Pt reports feeling well today. Pt denies bldg/lof/change in discharge/contractions/headache/vision changes/chest pain/SOB/edema. BP readings at home 112/77, 116/73, 122/81. Dr. Alberto met with pt and discussed POC. Plan: follow up as scheduled. Awaiting final decision on delivery date/time. Pt discharged stable and ambulatory.     Antonietta Ivey RN

## 2022-11-07 ENCOUNTER — HOSPITAL ENCOUNTER (OUTPATIENT)
Dept: ULTRASOUND IMAGING | Facility: CLINIC | Age: 27
Discharge: HOME | End: 2022-11-07
Attending: OBSTETRICS & GYNECOLOGY
Payer: COMMERCIAL

## 2022-11-07 ENCOUNTER — TELEPHONE (OUTPATIENT)
Dept: MATERNAL FETAL MEDICINE | Facility: CLINIC | Age: 27
End: 2022-11-07

## 2022-11-07 ENCOUNTER — OFFICE VISIT (OUTPATIENT)
Dept: MATERNAL FETAL MEDICINE | Facility: CLINIC | Age: 27
End: 2022-11-07
Attending: OBSTETRICS & GYNECOLOGY
Payer: COMMERCIAL

## 2022-11-07 ENCOUNTER — HOSPITAL ENCOUNTER (OUTPATIENT)
Facility: CLINIC | Age: 27
Discharge: HOME | End: 2022-11-07
Attending: OBSTETRICS & GYNECOLOGY | Admitting: OBSTETRICS & GYNECOLOGY
Payer: COMMERCIAL

## 2022-11-07 VITALS
HEART RATE: 113 BPM | BODY MASS INDEX: 36.89 KG/M2 | RESPIRATION RATE: 18 BRPM | SYSTOLIC BLOOD PRESSURE: 142 MMHG | DIASTOLIC BLOOD PRESSURE: 97 MMHG | OXYGEN SATURATION: 99 % | WEIGHT: 249.8 LBS

## 2022-11-07 VITALS
BODY MASS INDEX: 35.65 KG/M2 | TEMPERATURE: 98.3 F | DIASTOLIC BLOOD PRESSURE: 79 MMHG | HEIGHT: 70 IN | RESPIRATION RATE: 16 BRPM | WEIGHT: 249 LBS | SYSTOLIC BLOOD PRESSURE: 128 MMHG | HEART RATE: 93 BPM

## 2022-11-07 DIAGNOSIS — O14.90 PRE-ECLAMPSIA AFFECTING PREGNANCY, ANTEPARTUM: Primary | ICD-10-CM

## 2022-11-07 DIAGNOSIS — O35.BXX0 ANOMALY OF HEART OF FETUS AFFECTING PREGNANCY, ANTEPARTUM, SINGLE OR UNSPECIFIED FETUS: ICD-10-CM

## 2022-11-07 DIAGNOSIS — O09.90 HIGH RISK PREGNANCY, ANTEPARTUM: Primary | ICD-10-CM

## 2022-11-07 DIAGNOSIS — O35.BXX0 ANOMALY OF HEART OF FETUS AFFECTING PREGNANCY, ANTEPARTUM, SINGLE OR UNSPECIFIED FETUS: Primary | ICD-10-CM

## 2022-11-07 LAB
ABO/RH(D): NORMAL
ALT SERPL W P-5'-P-CCNC: 22 U/L (ref 0–50)
ANTIBODY SCREEN: NEGATIVE
AST SERPL W P-5'-P-CCNC: 16 U/L (ref 0–45)
CREAT SERPL-MCNC: 0.66 MG/DL (ref 0.52–1.04)
CREAT UR-MCNC: 16 MG/DL
ERYTHROCYTE [DISTWIDTH] IN BLOOD BY AUTOMATED COUNT: 13 % (ref 10–15)
GFR SERPL CREATININE-BSD FRML MDRD: >90 ML/MIN/1.73M2
HCT VFR BLD AUTO: 36.4 % (ref 35–47)
HGB BLD-MCNC: 12.4 G/DL (ref 11.7–15.7)
MCH RBC QN AUTO: 29.2 PG (ref 26.5–33)
MCHC RBC AUTO-ENTMCNC: 34.1 G/DL (ref 31.5–36.5)
MCV RBC AUTO: 86 FL (ref 78–100)
PLATELET # BLD AUTO: 342 10E3/UL (ref 150–450)
PROT UR-MCNC: <0.05 G/L
PROT/CREAT 24H UR: NORMAL MG/G{CREAT}
RBC # BLD AUTO: 4.24 10E6/UL (ref 3.8–5.2)
SPECIMEN EXPIRATION DATE: NORMAL
WBC # BLD AUTO: 14.4 10E3/UL (ref 4–11)

## 2022-11-07 PROCEDURE — 84450 TRANSFERASE (AST) (SGOT): CPT

## 2022-11-07 PROCEDURE — 84156 ASSAY OF PROTEIN URINE: CPT | Performed by: OBSTETRICS & GYNECOLOGY

## 2022-11-07 PROCEDURE — 76819 FETAL BIOPHYS PROFIL W/O NST: CPT | Mod: 26 | Performed by: OBSTETRICS & GYNECOLOGY

## 2022-11-07 PROCEDURE — G0463 HOSPITAL OUTPT CLINIC VISIT: HCPCS | Mod: 25

## 2022-11-07 PROCEDURE — G0463 HOSPITAL OUTPT CLINIC VISIT: HCPCS

## 2022-11-07 PROCEDURE — 76816 OB US FOLLOW-UP PER FETUS: CPT | Mod: 26 | Performed by: OBSTETRICS & GYNECOLOGY

## 2022-11-07 PROCEDURE — 87653 STREP B DNA AMP PROBE: CPT | Performed by: OBSTETRICS & GYNECOLOGY

## 2022-11-07 PROCEDURE — 76819 FETAL BIOPHYS PROFIL W/O NST: CPT

## 2022-11-07 PROCEDURE — 99214 OFFICE O/P EST MOD 30 MIN: CPT | Mod: 25 | Performed by: OBSTETRICS & GYNECOLOGY

## 2022-11-07 PROCEDURE — 86850 RBC ANTIBODY SCREEN: CPT

## 2022-11-07 PROCEDURE — 84460 ALANINE AMINO (ALT) (SGPT): CPT

## 2022-11-07 PROCEDURE — 96372 THER/PROPH/DIAG INJ SC/IM: CPT | Performed by: STUDENT IN AN ORGANIZED HEALTH CARE EDUCATION/TRAINING PROGRAM

## 2022-11-07 PROCEDURE — 85027 COMPLETE CBC AUTOMATED: CPT

## 2022-11-07 PROCEDURE — 36415 COLL VENOUS BLD VENIPUNCTURE: CPT

## 2022-11-07 PROCEDURE — 82565 ASSAY OF CREATININE: CPT

## 2022-11-07 PROCEDURE — 250N000011 HC RX IP 250 OP 636: Performed by: STUDENT IN AN ORGANIZED HEALTH CARE EDUCATION/TRAINING PROGRAM

## 2022-11-07 PROCEDURE — 86901 BLOOD TYPING SEROLOGIC RH(D): CPT

## 2022-11-07 RX ORDER — LIDOCAINE 40 MG/G
CREAM TOPICAL
Status: DISCONTINUED | OUTPATIENT
Start: 2022-11-07 | End: 2022-11-07 | Stop reason: HOSPADM

## 2022-11-07 RX ORDER — BETAMETHASONE SODIUM PHOSPHATE AND BETAMETHASONE ACETATE 3; 3 MG/ML; MG/ML
12 INJECTION, SUSPENSION INTRA-ARTICULAR; INTRALESIONAL; INTRAMUSCULAR; SOFT TISSUE ONCE
Status: COMPLETED | OUTPATIENT
Start: 2022-11-07 | End: 2022-11-07

## 2022-11-07 RX ADMIN — BETAMETHASONE SODIUM PHOSPHATE AND BETAMETHASONE ACETATE 12 MG: 3; 3 INJECTION, SUSPENSION INTRA-ARTICULAR; INTRALESIONAL; INTRAMUSCULAR at 12:36

## 2022-11-07 ASSESSMENT — PAIN SCALES - GENERAL: PAINLEVEL: NO PAIN (0)

## 2022-11-07 ASSESSMENT — ACTIVITIES OF DAILY LIVING (ADL)
ADLS_ACUITY_SCORE: 18
ADLS_ACUITY_SCORE: 18

## 2022-11-07 NOTE — DISCHARGE INSTRUCTIONS
Discharge Instruction for Undelivered Patients      You were seen for: pre eclamptic evaluation  We Consulted: Dr. Roberto and Dr. Coleman  You had (Test or Medicine): labs and serial BP's     Diet:   Drink 8 to 12 glasses of liquids (milk, juice, water) every day.  You may eat meals and snacks.     Activity:  Count fetal kicks everyday (see handout)     Call your provider if you notice:  Swelling in your face or increased swelling in your hands or legs.  Headaches that are not relieved by Tylenol (acetaminophen).  Changes in your vision (blurring: seeing spots or stars.)  Nausea (sick to your stomach) and vomiting (throwing up).   Weight gain of 5 pounds or more per week.  Heartburn that doesn't go away.  Signs of bladder infection: pain when you urinate (use the toilet), need to go more often and more urgently.  The bag of weaver (rupture of membranes) breaks, or you notice leaking in your underwear.  Bright red blood in your underwear.  Abdominal (lower belly) or stomach pain.  For first baby: Contractions (tightening) less than 5 minutes apart for one hour or more.  Second (plus) baby: Contractions (tightening) less than 10 minutes apart and getting stronger.  *If less than 34 weeks: Contractions (tightening) more than 6 times in one hour.  Increase or change in vaginal discharge (note the color and amount    Follow-up:  As scheduled in the clinic

## 2022-11-07 NOTE — PLAN OF CARE
Data: Patient admitted to room triage 2. Patient is a . Prenatal record reviewed.   OB History    Para Term  AB Living   1 0 0 0 0 0   SAB IAB Ectopic Multiple Live Births   0 0 0 0 0      # Outcome Date GA Lbr Yeison/2nd Weight Sex Delivery Anes PTL Lv   1 Current            .  Medical History:   Past Medical History:   Diagnosis Date     Migraines    Was seen in the clinic today and had elevated BP. [facility].  Gestational age 35w5d. Vital signs per doc flowsheet. Fetal movement present. Patient reports No chief complaint on file.   as reason for admission. Spouse present.  Action: Verbal consent for EFM, external fetal monitors applied. Admission assessment completed. Patient instructed to report change in fetal movement, contractions, vaginal leaking of fluid or bleeding, abdominal pain, or any concerns related to the pregnancy to her nurse/physician. Patient oriented to room, call light in reach.   Response:   informed of arrival. Plan per provider are labs and serial BP's. Patient verbalized agreement with plan.      Goal Outcome Evaluation:

## 2022-11-07 NOTE — H&P
"[facility]  OB Triage Note    CC: Rule out preeclampsia    HPI: Ms. Jennifer El is a 27 year old  at 35w5d by LMP c/w 5w6d US, who presents with for preeclampsia evaluation.     She was seen in clinic today and had non-sustained severe range blood pressures. She had an elevated blood pressure in clinic on 10/31 and had a preeclampsia evaluation at that time, which was normal. She has been monitoring her blood pressures since that time and they have been normal at home in the 110-120s/70-80s. She denies any HA, vision changes, chest pain, dyspnea, RUQ/epigastic pain. Endorses normal FM. Denies VB, LOF, or regular contractions.     Obstetric Complications  - Fetal Hypoplastic left heart syndrome  - Gestational hypertension    Objective:  Patient Vitals for the past 24 hrs:   BP Temp Temp src Pulse Resp Height Weight   22 1038 136/89 -- -- -- -- -- --   22 1032 -- -- -- -- -- 1.765 m (5' 9.5\") 112.9 kg (249 lb)   22 1018 (!) 130/98 -- -- -- -- -- --   22 1017 (!) 135/95 98.3  F (36.8  C) Oral 93 18 -- --     Gen: Well-appearing, NAD  CV: RRR, no murmurs  Pulm: CTAB, no wheezes  Abd: Soft, gravid, non-tender to palpation, no RUQ/epigastric tenderness to palpation  Ext: Trace pretibial LE edema   Neuro: Reflexes 2+, no myoclonus      FHT: , mod isaac, pos accels, no decels  [place]: One ctx in 10 mins    Labs:  Component      Latest Ref Rng & Units 2022   WBC      4.0 - 11.0 10e3/uL 14.4 (H)   RBC Count      3.80 - 5.20 10e6/uL 4.24   Hemoglobin      11.7 - 15.7 g/dL 12.4   Hematocrit      35.0 - 47.0 % 36.4   MCV      78 - 100 fL 86   MCH      26.5 - 33.0 pg 29.2   MCHC      31.5 - 36.5 g/dL 34.1   RDW      10.0 - 15.0 % 13.0   Platelet Count      150 - 450 10e3/uL 342     Component      Latest Ref Rng & Units 2022   Creatinine      0.52 - 1.04 mg/dL 0.66   GFR Estimate      >60 mL/min/1.73m2 >90   AST      0 - 45 U/L 16   ALT      0 - 50 U/L 22 "     Component      Latest Ref Rng & Units 2022   Protein Random Urine      g/L <0.05   Protein Total Urine g/gr Creatinine          Creatinine Urine      mg/dL 16       Assesment/Plan:  Ms. Jennifer El is a 27 year old  at 35w5d by LMP c/w 5w6d here with for preeclampsia evaluation in the setting of elevated blood pressures in clinic. Her blood pressures have been normal to mild range in L&D triage, preeclamsia labs have been normal, and she does not have any symptoms of preeclampsia with severe features. Currently she meets criteria for gestational hypertension.     Gestational Hypertension  - Blood pressures normal to mild range in clinic  - Preeclampsia labs normal, UPC negative  - She has BP cuff at home and continue to monitor BP  - Symptoms of preeclampsia again reviewed and patient demonstrates understanding.  - Discussed that at a minimum , delivery at 37 weeks indicated given this diagnosis. Will work to coordinate this delivery to ensure appropriate  cardiac services for the patient and her baby.    Fetal Well Being  - Category 1 FHT. Reactive and reassuring  - Administered one dose of late  BMZ. Will have patient receive second dose tomorrow in clinic    Seen and discussed with Dr. Roberto.    Daniel Taylor MD  Maternal-Fetal Medicine Fellow    Physician Attestation   I saw this patient with the resident and agree with the resident/fellow's findings and plan of care as documented in the note.      I personally reviewed vital signs, medications, labs, imaging and EFM.    Key findings: In summary, Jennifer El is a  at 35w5d who presents for preeclampsia evaluation due to elevated BP in clinic.  After evaluation, Jennifer's overall clinical history is most consistent with a diagnosis of gestational hypertension.  Plan twice weekly BPP and BP checks and weekly preeclampsia labs as outpatient and delivery at 37 weeks gestation.  Jennifer will closely monitor for signs/symptoms  of preeclampsia and report any that are present.  S/P BMZ x 1 now and will repeat tomorrow as outpatient.     Brady Roberto MD  Date of Service (when I saw the patient): 22    Time Spent on this Encounter   I spent 35 minutes on the unit/floor managing the care of Jennifer El. Over 50% of my time was spent on the following:   - Counseling the patient and/or family regarding: diagnostic results, prognosis, risks and benefits of treatment options, recommended follow-up and medical compliance  - Coordination of care with the: nurse and patient     In summary, Jennifer El is a  at 35w5d who presents for preeclampsia evaluation due to elevated BP in clinic.  After evaluation, Jennifer's overall clinical history is most consistent with a diagnosis of gestational hypertension.  Plan twice weekly BPP and BP checks and weekly preeclampsia labs as outpatient and delivery at 37 weeks gestation.  Jennifer will closely monitor for signs/symptoms of preeclampsia and report any that are present.  S/P BMZ x 1 now and will repeat tomorrow as outpatient.         Brady Roberto MD

## 2022-11-07 NOTE — PLAN OF CARE
Goal Outcome Evaluation:       BP's elevated but no severe range. Denies HA, visual changes and no epigastric pain. FHT's 130 with moderate variability and accelerations. No decelerations noted. No contractions, leaking nor bleeding. Betamethasone given as ordered. Discharged home with written and verbal instructions.

## 2022-11-07 NOTE — NURSING NOTE
Jennifer, accompanied by her SO Allie, seen in clinic today for Hydrops check/BPP and OB visit at 35w5d gestation for pregnancy complicated by HLHS, GTHN (see report/notes). VS done, see flowsheet, serial BP's done as patient reports BP's 110-120' /70's at home. Instructed pt to bring home BP cuff to clinic at next appointment. Pt reports positive fetal movement. Pt denies bldg/lof/contractions/headache/vision changes/chest pain/SOB/edema. Jennifer does report an increase in vaginal discharge, does not have itching or odor, feels like it is normal in pregnancy. Dr. Alberto reviewed US, met with pt and discussed POC. Plan for evaluation in L&D for labs and BP monitoring. Report called to [facility] L&D charge nurse.  GBS and urine protein collected and sent to lab. Pt discharged stable and ambulatory to L&D.    Lou Beck RN

## 2022-11-07 NOTE — PROGRESS NOTES
Maternal-Fetal Medicine Return OB Visit     Jennifer El  : 1995  MRN: [id number]     S: Feeling good today. Continues to have swelling in the lower extremities is stable, using erica stockings. No headaches, visual changes, or epigastric pain. Checking blood pressure at home and has been within the normal range (all <140/90). Just purchased new blood pressure cuff Regular fetal movement. No regular contractions, leakage of fluid or bleeding.     Pregnancy complicated by:  1) Fetal Hypoplastic Left Heart Syndrome with intact atrial septum  2) Gestational hypertension     Obstetrics History:                   OB History    Para Term  AB Living   1 0 0 0 0 0   SAB IAB Ectopic Multiple Live Births      0 0 0 0 0          # Outcome Date GA Lbr Yeison/2nd Weight Sex Delivery Anes PTL Lv   1 Current                           PHYSICAL EXAM:  BP (!) 142/97 (BP Location: Left arm, Patient Position: Sitting, Cuff Size: Adult Large)   Pulse 113   Resp 18   Wt 113.3 kg (249 lb 12.8 oz)   LMP 2022   SpO2 99%   BMI 36.89 kg/m    Gen: NAD, well appearing  Chest: Non-labored breathing  Abdomen: gravid  Extremities: 2+ pedal edema     Labs:         Lab Results   Component Value Date     AS Negative 2022     HEPBANG Nonreactive 2022     HGB 11.9 2022      GBS Status: Collected today     Genetic Testing:   Normal cell-free DNA screening     Ultrasounds:   Please see the imaging tab for details of the ultrasound performed today.     ASSESSMENT and PLAN:  27 year old y.o.  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 Awaiting confirmation from Pediatric Cardiology regarding date, but anticipating delivery now at around 37 weeks gestation given new diagnosis of gestational hypertension.  -Desire to have Mom and  present at the time of delivery for support, will discuss with anesthesiology once surgery is scheduled  -Fetal MRI on 2022 did not show evidence of \"Nutmeg\" lung.  -Twice weekly BPP with hydrops assessment.  -Every 4 week assessments of fetal growth.     # Gestational hypertension  Blood pressure again elevated in clinic today. No other signs and symptoms of preeclampsia. Blood pressures continue to be normal at home. There is likely a component of anxiety leading to the degree of hypertension in clinic, but that the degree of elevation in blood pressure is more than what I would attribute to anxiety alone. We discussed that given that the blood pressures today in clinic have been intermittently in the severe range that would recommend further evaluation at the [facility] at this time.  -To [facility] for preeclampsia evaluation  -Recommend BMZ course given risk for late  delivery; if discharged home will plan to RTC tomorrow for repeat blood pressure check and 2nd BMZ injection  -Bring blood pressure cuff clinic to calibrate  -Delivery at around 37 weeks gestation (see below for counseling)     #Routine PNC:  - Prenatal labs:   Rh: +  antibody: neg               HepB/HIV/RPR/HepC: nonreactive               UC: no growth               Rubella: immune                 GCT: pass    - Pap smear: need to discuss when last completed  - Immunizations:  TDaP today  - Genetic screening: low-risk NIPT  - GBS collected today     Joleen Alberto MD  Specialist in Maternal-Fetal Medicine     2022 , 8:26 AM      ADDENDUM: Jennifer had normalization of her blood pressures with " extended monitoring. Her preeclampsia labs returned normal. I discussed her case with Dr. Roberto and Dr. Matson, and as a team we discussed delivery at 37 weeks gestation given the maternal diagnosis of gestational hypertension with progressively increasing blood pressures. The risks of delivery at 37 weeks with known HLHS with IAS in a planned setting, versus the risk for emergent delivery if there is progression of her disease if expectant management until 38-39 weeks were weighed, and at this time I would recommend proceeding with delivery at 37 weeks gestation. Discussed this with Jennifer, who is in agreement with the plan. We will work with Pediatric Cardiology and CV surgery to find a delivery date.     I spent 3 minutes prior to the visit preparing to see the patient (reviewing medical records and tests).  I spent 22 minutes face-face-to-face with the patient during the visit with the majority (>50%) spent on counseling and coordination of care with the patient and/or family members.  I spent 14 minutes after the visit with the patient documenting the visit in the electronic health record and/or communicating with other health care professionals and/or care coordination.       Total time spent on today's date of service: 31 minutes.

## 2022-11-08 ENCOUNTER — OFFICE VISIT (OUTPATIENT)
Dept: PEDIATRIC CARDIOLOGY | Facility: CLINIC | Age: 27
End: 2022-11-08
Attending: PEDIATRICS
Payer: COMMERCIAL

## 2022-11-08 ENCOUNTER — ALLIED HEALTH/NURSE VISIT (OUTPATIENT)
Dept: MATERNAL FETAL MEDICINE | Facility: CLINIC | Age: 27
End: 2022-11-08
Attending: OBSTETRICS & GYNECOLOGY
Payer: COMMERCIAL

## 2022-11-08 VITALS
BODY MASS INDEX: 37 KG/M2 | HEIGHT: 69 IN | OXYGEN SATURATION: 99 % | SYSTOLIC BLOOD PRESSURE: 137 MMHG | HEART RATE: 93 BPM | WEIGHT: 249.78 LBS | DIASTOLIC BLOOD PRESSURE: 90 MMHG | RESPIRATION RATE: 16 BRPM

## 2022-11-08 VITALS — HEART RATE: 93 BPM | DIASTOLIC BLOOD PRESSURE: 90 MMHG | SYSTOLIC BLOOD PRESSURE: 137 MMHG

## 2022-11-08 DIAGNOSIS — O09.90 HIGH RISK PREGNANCY, ANTEPARTUM: Primary | ICD-10-CM

## 2022-11-08 DIAGNOSIS — O09.90 HIGH RISK PREGNANCY, ANTEPARTUM: ICD-10-CM

## 2022-11-08 DIAGNOSIS — O35.BXX0 ANOMALY OF HEART OF FETUS AFFECTING PREGNANCY, ANTEPARTUM, SINGLE OR UNSPECIFIED FETUS: Primary | ICD-10-CM

## 2022-11-08 DIAGNOSIS — O35.BXX0 ANOMALY OF HEART OF FETUS AFFECTING PREGNANCY, ANTEPARTUM, SINGLE OR UNSPECIFIED FETUS: ICD-10-CM

## 2022-11-08 DIAGNOSIS — O13.9 GESTATIONAL HYPERTENSION: ICD-10-CM

## 2022-11-08 LAB — GP B STREP DNA SPEC QL NAA+PROBE: NEGATIVE

## 2022-11-08 PROCEDURE — 99215 OFFICE O/P EST HI 40 MIN: CPT | Performed by: PEDIATRICS

## 2022-11-08 PROCEDURE — 96372 THER/PROPH/DIAG INJ SC/IM: CPT | Performed by: OBSTETRICS & GYNECOLOGY

## 2022-11-08 PROCEDURE — G0463 HOSPITAL OUTPT CLINIC VISIT: HCPCS

## 2022-11-08 PROCEDURE — 250N000011 HC RX IP 250 OP 636: Performed by: OBSTETRICS & GYNECOLOGY

## 2022-11-08 RX ORDER — BETAMETHASONE SODIUM PHOSPHATE AND BETAMETHASONE ACETATE 3; 3 MG/ML; MG/ML
12 INJECTION, SUSPENSION INTRA-ARTICULAR; INTRALESIONAL; INTRAMUSCULAR; SOFT TISSUE ONCE
Status: COMPLETED | OUTPATIENT
Start: 2022-11-08 | End: 2022-11-08

## 2022-11-08 RX ADMIN — BETAMETHASONE SODIUM PHOSPHATE AND BETAMETHASONE ACETATE 12 MG: 3; 3 INJECTION, SUSPENSION INTRA-ARTICULAR; INTRALESIONAL; INTRAMUSCULAR at 12:31

## 2022-11-08 NOTE — NURSING NOTE
Jennifer seen in clinic today for a Nurse only visit at 35w6d gestation for pregnancy complicated by Fetal HLHS, GHTN (see report/notes). VSS. Pt reports positive fetal movement, see flowsheet. Pt denies bldg/lof/change in discharge/contractions/headache/vision changes/chest pain/SOB/edema. 2nd dose of Betamethasone given, see MAR. Dr. Alberto notified of BP, no action needed at this time. Plan for hydrops check/BPP and BP check on 11/10, working to schedule c/s in coordination with cardiology team. Once scheduled (11/16 or 11/18), will notify pt. Future visits previously scheduled. Pt discharged stable and ambulatory.   Lou Beck RN

## 2022-11-08 NOTE — PATIENT INSTRUCTIONS
[facility]  [address]  [address]  [place] [id number] [phone]      Cardiology Clinic   RN Care Coordinators: Vanessa Franklin or Jerica Crockett  [phone]  Pediatric Call Center/Scheduling  [phone]    After Hours and Emergency Contact Number  [phone]  * Ask for the pediatric cardiologist on call         Prescription Renewals  The pharmacy must fax requests to [phone]  * Please allow 3-4 days for prescriptions to be authorized     Imaging Scheduling for Peds Cardiology  Annalise Chew [phone]  SHE WILL REACH OUT TO YOU TO SCHEDULE ANY IMAGING NEEDS THAT WERE ORDERED.    Your feedback is very important to us. If you receive a survey about your visit today, please take the time to fill this out so we can continue to improve.

## 2022-11-08 NOTE — NURSING NOTE
"No chief complaint on file.      Vitals:    11/08/22 1245   BP: (!) 137/90   BP Location: Right arm   Patient Position: Sitting   Cuff Size: Adult Regular   Pulse: 93   Resp: 16   SpO2: 99%   Weight: 249 lb 12.5 oz (113.3 kg)   Height: 5' 8.86\" (174.9 cm)       Haseeb Baer, EMT  November 8, 2022   "

## 2022-11-08 NOTE — LETTER
2022      RE: Jennifer El  [address] [place] [address]     Dear Colleague,    Thank you for the opportunity to participate in the care of your patient, Jennifer El, at the [facility] at [facility]. Please see a copy of my visit note below.                                                      Pediatric Cardiology Clinic Note      Patient:  Jennifer El MRN:  [id number]   YOB: 1995 Age:  27 year old   Date of Visit:  2022 PCP:  Leydi Ref-Primary, Physician     Dear Dr. Holley Ref-Primary, Physician:    I had the pleasure of seeing your patient Jennifer El at the [facility] for a consultation on 2022 for discussion for her  post delivery therapeutic options.      Jennifer El is a 27 year old who has been followed by my colleague Dr. Matson for her fetus being diagnosed with hypoplastic left heart syndrome, MS, AA. In the most recent echocardiogram which was reviewed by me as well demonstrated restrictive atrial septum.     Pulmonary venous doppler interrogation demonstrated continuous forward flow with an increased A-wave reversal (velocity time integral reverse/forward flow ~53%). This is concerning. In addition, there is no levocardinal decompressing vein demonstrated.     Jennifer is scheduled for a planned C section at 37 weeks gestational age on 22 per [facility] recommendations due to her hypertension. We have requested the delivery to be scheduled near the cardiac OR and the cath lab and OR have been kept on standby for the baby.     I had extensive discussion about the anatomy and physiology with the help of diagrams. Jennifer has read extensively and seems to be very appropriately informed about the possible complications. I discussed with her the possible need for ECMO, percutaneous or hybrid  atrial septal decompression (balloon/stent) as well as possibility of hybrid PA bands and PDA stent. The exact interventions will be decided after delivery based on the hemodynamic status and needs. I went over the procedure in detail with both Jennifer and her  including the anticipated complications. Both verbalized understanding, asked relevant questions and consented for the procedure. The consents were signed today to save time after delivery. Blood consents were also obtained as well.     Jennifer expressed a desire to meet with Dr. Bolton as well and I sent a message for that to be scheduled as well. I offered to meet her again if any further questions come up or if we have any change in the plan.     I spent 60 minutes on the day of the visit.      Thank you for referring this wonderful patient for a consultation. Please feel free to reach us in case of questions or concerns.     Sincerely,      SAM Petersen MD [facility]  Pediatric Interventional Cardiologist   of Pediatrics  Pager: [phone]  Office: [phone]    CC:      CC  Patient Care Team:  No Ref-Primary, Physician as PCP - Yulia Garcias MD as Assigned PCP  Ruthann Ibrahim APRN CNM as Assigned OBGYN Provider  Esa Bolton MD as Assigned Pediatric Specialist Provider  SADIA GALEANO      [Note: Chart documentation done in part with Dragon Voice Recognition software. Although reviewed after completion, some word and grammatical errors may remain.]          Please do not hesitate to contact me if you have any questions/concerns.     Sincerely,       Myles Suarez MD

## 2022-11-09 ENCOUNTER — PREP FOR PROCEDURE (OUTPATIENT)
Dept: MATERNAL FETAL MEDICINE | Facility: CLINIC | Age: 27
End: 2022-11-09

## 2022-11-09 DIAGNOSIS — O13.3 GESTATIONAL HYPERTENSION, THIRD TRIMESTER: ICD-10-CM

## 2022-11-09 DIAGNOSIS — O35.BXX0 ANOMALY OF HEART OF FETUS AFFECTING PREGNANCY, ANTEPARTUM, SINGLE OR UNSPECIFIED FETUS: Primary | ICD-10-CM

## 2022-11-09 RX ORDER — ACETAMINOPHEN 325 MG/1
975 TABLET ORAL ONCE
Status: CANCELLED | OUTPATIENT
Start: 2022-11-09 | End: 2022-11-09

## 2022-11-09 RX ORDER — MISOPROSTOL 200 UG/1
400 TABLET ORAL
Status: CANCELLED | OUTPATIENT
Start: 2022-11-09

## 2022-11-09 RX ORDER — OXYTOCIN/0.9 % SODIUM CHLORIDE 30/500 ML
100-340 PLASTIC BAG, INJECTION (ML) INTRAVENOUS CONTINUOUS PRN
Status: CANCELLED | OUTPATIENT
Start: 2022-11-09

## 2022-11-09 RX ORDER — OXYTOCIN 10 [USP'U]/ML
10 INJECTION, SOLUTION INTRAMUSCULAR; INTRAVENOUS
Status: CANCELLED | OUTPATIENT
Start: 2022-11-09

## 2022-11-09 RX ORDER — TRANEXAMIC ACID 10 MG/ML
1 INJECTION, SOLUTION INTRAVENOUS EVERY 30 MIN PRN
Status: CANCELLED | OUTPATIENT
Start: 2022-11-09

## 2022-11-09 RX ORDER — CARBOPROST TROMETHAMINE 250 UG/ML
250 INJECTION, SOLUTION INTRAMUSCULAR
Status: CANCELLED | OUTPATIENT
Start: 2022-11-09

## 2022-11-09 RX ORDER — CEFAZOLIN SODIUM 2 G/100ML
2 INJECTION, SOLUTION INTRAVENOUS
Status: CANCELLED | OUTPATIENT
Start: 2022-11-09

## 2022-11-09 RX ORDER — CEFAZOLIN SODIUM 2 G/100ML
2 INJECTION, SOLUTION INTRAVENOUS SEE ADMIN INSTRUCTIONS
Status: CANCELLED | OUTPATIENT
Start: 2022-11-09

## 2022-11-09 RX ORDER — SODIUM CHLORIDE, SODIUM LACTATE, POTASSIUM CHLORIDE, CALCIUM CHLORIDE 600; 310; 30; 20 MG/100ML; MG/100ML; MG/100ML; MG/100ML
INJECTION, SOLUTION INTRAVENOUS CONTINUOUS
Status: CANCELLED | OUTPATIENT
Start: 2022-11-09

## 2022-11-09 RX ORDER — LIDOCAINE 40 MG/G
CREAM TOPICAL
Status: CANCELLED | OUTPATIENT
Start: 2022-11-09

## 2022-11-09 RX ORDER — CITRIC ACID/SODIUM CITRATE 334-500MG
30 SOLUTION, ORAL ORAL
Status: CANCELLED | OUTPATIENT
Start: 2022-11-09

## 2022-11-09 RX ORDER — METHYLERGONOVINE MALEATE 0.2 MG/ML
200 INJECTION INTRAVENOUS
Status: CANCELLED | OUTPATIENT
Start: 2022-11-09

## 2022-11-09 RX ORDER — MISOPROSTOL 200 UG/1
800 TABLET ORAL
Status: CANCELLED | OUTPATIENT
Start: 2022-11-09

## 2022-11-09 RX ORDER — OXYTOCIN/0.9 % SODIUM CHLORIDE 30/500 ML
340 PLASTIC BAG, INJECTION (ML) INTRAVENOUS CONTINUOUS PRN
Status: CANCELLED | OUTPATIENT
Start: 2022-11-09

## 2022-11-10 ENCOUNTER — OFFICE VISIT (OUTPATIENT)
Dept: MATERNAL FETAL MEDICINE | Facility: CLINIC | Age: 27
End: 2022-11-10
Attending: OBSTETRICS & GYNECOLOGY
Payer: COMMERCIAL

## 2022-11-10 ENCOUNTER — OFFICE VISIT (OUTPATIENT)
Dept: PEDIATRIC CARDIOLOGY | Facility: CLINIC | Age: 27
End: 2022-11-10
Attending: THORACIC SURGERY (CARDIOTHORACIC VASCULAR SURGERY)
Payer: COMMERCIAL

## 2022-11-10 ENCOUNTER — HOSPITAL ENCOUNTER (OUTPATIENT)
Dept: ULTRASOUND IMAGING | Facility: CLINIC | Age: 27
Discharge: HOME | End: 2022-11-10
Attending: OBSTETRICS & GYNECOLOGY
Payer: COMMERCIAL

## 2022-11-10 VITALS
DIASTOLIC BLOOD PRESSURE: 89 MMHG | OXYGEN SATURATION: 98 % | HEART RATE: 86 BPM | SYSTOLIC BLOOD PRESSURE: 137 MMHG | RESPIRATION RATE: 18 BRPM

## 2022-11-10 DIAGNOSIS — O35.BXX0 ANOMALY OF HEART OF FETUS AFFECTING PREGNANCY, ANTEPARTUM, SINGLE OR UNSPECIFIED FETUS: ICD-10-CM

## 2022-11-10 DIAGNOSIS — O35.BXX0 ANOMALY OF HEART OF FETUS AFFECTING PREGNANCY, ANTEPARTUM, SINGLE OR UNSPECIFIED FETUS: Primary | ICD-10-CM

## 2022-11-10 DIAGNOSIS — O13.3 GESTATIONAL HYPERTENSION, THIRD TRIMESTER: Primary | ICD-10-CM

## 2022-11-10 DIAGNOSIS — O13.3 GESTATIONAL HYPERTENSION, THIRD TRIMESTER: ICD-10-CM

## 2022-11-10 PROCEDURE — 76816 OB US FOLLOW-UP PER FETUS: CPT | Mod: 26 | Performed by: OBSTETRICS & GYNECOLOGY

## 2022-11-10 PROCEDURE — 76819 FETAL BIOPHYS PROFIL W/O NST: CPT

## 2022-11-10 PROCEDURE — 99214 OFFICE O/P EST MOD 30 MIN: CPT | Performed by: THORACIC SURGERY (CARDIOTHORACIC VASCULAR SURGERY)

## 2022-11-10 PROCEDURE — 76819 FETAL BIOPHYS PROFIL W/O NST: CPT | Mod: 26 | Performed by: OBSTETRICS & GYNECOLOGY

## 2022-11-10 ASSESSMENT — PAIN SCALES - GENERAL: PAINLEVEL: NO PAIN (0)

## 2022-11-10 NOTE — PROGRESS NOTES
Pediatric Cardiology Clinic Note      Patient:  Jennifer El MRN:  [id number]   YOB: 1995 Age:  27 year old   Date of Visit:  2022 PCP:  Leydi Ref-Primary, Physician     Dear Dr. Holley Ref-Primary, Physician:    I had the pleasure of seeing your patient Jennifer El at the [facility] for a consultation on 2022 for discussion for her  post delivery therapeutic options.      Jennifer El is a 27 year old who has been followed by my colleague Dr. Matson for her fetus being diagnosed with hypoplastic left heart syndrome, MS, AA. In the most recent echocardiogram which was reviewed by me as well demonstrated restrictive atrial septum.     Pulmonary venous doppler interrogation demonstrated continuous forward flow with an increased A-wave reversal (velocity time integral reverse/forward flow ~53%). This is concerning. In addition, there is no levocardinal decompressing vein demonstrated.     Jennifer is scheduled for a planned C section at 37 weeks gestational age on 22 per [facility] recommendations due to her hypertension. We have requested the delivery to be scheduled near the cardiac OR and the cath lab and OR have been kept on standby for the baby.     I had extensive discussion about the anatomy and physiology with the help of diagrams. Jennifer has read extensively and seems to be very appropriately informed about the possible complications. I discussed with her the possible need for ECMO, percutaneous or hybrid atrial septal decompression (balloon/stent) as well as possibility of hybrid PA bands and PDA stent. The exact interventions will be decided after delivery based on the hemodynamic status and needs. I went over the procedure in detail with both Jennifer and her  including the anticipated complications. Both verbalized understanding, asked relevant questions  and consented for the procedure. The consents were signed today to save time after delivery. Blood consents were also obtained as well.     Jennifer expressed a desire to meet with Dr. Bolton as well and I sent a message for that to be scheduled as well. I offered to meet her again if any further questions come up or if we have any change in the plan.     I spent 60 minutes on the day of the visit.      Thank you for referring this wonderful patient for a consultation. Please feel free to reach us in case of questions or concerns.     Sincerely,      SAM Petersen MD [facility]  Pediatric Interventional Cardiologist   of Pediatrics  Pager: [phone]  Office: [phone]    CC:    1. No Ref-Primary, Physician    2.  CC  Patient Care Team:  No Ref-Primary, Physician as PCP - General  Yulia Calderon MD as Assigned PCP  Ruthann Ibrahim APRN CNM as Assigned OBGYN Provider  Esa Bolton MD as Assigned Pediatric Specialist Provider  SADIA GALEANO      [Note: Chart documentation done in part with Dragon Voice Recognition software. Although reviewed after completion, some word and grammatical errors may remain.]

## 2022-11-10 NOTE — NURSING NOTE
Jennifer seen in clinic today for RL2/BPP and nurse only visit at 36w1d gestation for pregnancy complicated by HLHS, GHTN (see report/notes). Pt reports home BP this morning was 114/74. VS done, see flowsheets. Dr. Samuel aware of BP's. Pt reports positive fetal movement. Pt denies bldg/lof/change in discharge/contractions/headache/vision changes/chest pain/SOB/edema. Dr. Samuel met with pt and discussed US results. Plan for Hydrops check/BPP and OBV on 11/14, c/s planned for 11/16. Will give soap, pre-op drink, c/s instructions, draw pre op labs and collect Covid swab on 11/14. Danger signs of Pre E and Labor reviewed and when to call or come to hospital, Pt VU and agrees with plan. Pt will also continue montoring her BP at home daily. Future visits previously scheduled. Pt discharged stable and ambulatory.   Lou Beck RN

## 2022-11-10 NOTE — PROGRESS NOTES
"Please see \"Imaging\" tab under \"Chart Review\" for details of today's US at the [facility].    Papo Samuel MD  Maternal-Fetal Medicine      "

## 2022-11-10 NOTE — LETTER
11/10/2022      RE: Jennifer El  [address] [facility] [address]     Dear Colleague,    Thank you for the opportunity to participate in the care of your patient, Jennifer El, at the [facility] at [facility]. Please see a copy of my visit note below.    I had the pleasure of seeing Jennifer El at the [facility] on 11/10/2022 in prenatal CV surgery consultation  She presented today accompanied by her  and mother.   The multidisciplinary plan and decision algorithm was discussed.   All questions were addressed.    Plan:   urgent balloon septostomy, with consideration for hybrid stage 1 palliation (bilateral PA Bands)  for HLHS.  VA ECMO if baby unstable.           I spent approximately 30 minutes for this discussion.        Please do not hesitate to contact me if you have any questions/concerns.     Sincerely,       Esa Bolton MD

## 2022-11-10 NOTE — LETTER
Date:November 17, 2022      Patient was self referred, no letter generated. Do not send.        [facility] Health Information

## 2022-11-12 ENCOUNTER — ANESTHESIA EVENT (OUTPATIENT)
Dept: SURGERY | Facility: CLINIC | Age: 27
End: 2022-11-12
Payer: COMMERCIAL

## 2022-11-13 LAB
ABO/RH(D): NORMAL
ANTIBODY SCREEN: NEGATIVE
SPECIMEN EXPIRATION DATE: NORMAL

## 2022-11-14 ENCOUNTER — HOSPITAL ENCOUNTER (OUTPATIENT)
Dept: ULTRASOUND IMAGING | Facility: CLINIC | Age: 27
Discharge: HOME | End: 2022-11-14
Attending: OBSTETRICS & GYNECOLOGY
Payer: COMMERCIAL

## 2022-11-14 ENCOUNTER — TELEPHONE (OUTPATIENT)
Dept: MATERNAL FETAL MEDICINE | Facility: CLINIC | Age: 27
End: 2022-11-14

## 2022-11-14 ENCOUNTER — OFFICE VISIT (OUTPATIENT)
Dept: MATERNAL FETAL MEDICINE | Facility: CLINIC | Age: 27
End: 2022-11-14
Attending: OBSTETRICS & GYNECOLOGY
Payer: COMMERCIAL

## 2022-11-14 ENCOUNTER — APPOINTMENT (OUTPATIENT)
Dept: LAB | Facility: CLINIC | Age: 27
End: 2022-11-14
Attending: OBSTETRICS & GYNECOLOGY
Payer: COMMERCIAL

## 2022-11-14 VITALS
DIASTOLIC BLOOD PRESSURE: 98 MMHG | RESPIRATION RATE: 18 BRPM | OXYGEN SATURATION: 98 % | WEIGHT: 250.2 LBS | HEART RATE: 94 BPM | BODY MASS INDEX: 37.1 KG/M2 | SYSTOLIC BLOOD PRESSURE: 149 MMHG

## 2022-11-14 DIAGNOSIS — O35.BXX0 ANOMALY OF HEART OF FETUS AFFECTING PREGNANCY, ANTEPARTUM, SINGLE OR UNSPECIFIED FETUS: ICD-10-CM

## 2022-11-14 DIAGNOSIS — O13.3 GESTATIONAL HYPERTENSION, THIRD TRIMESTER: ICD-10-CM

## 2022-11-14 DIAGNOSIS — O35.BXX0 ANOMALY OF HEART OF FETUS AFFECTING PREGNANCY, ANTEPARTUM, SINGLE OR UNSPECIFIED FETUS: Primary | ICD-10-CM

## 2022-11-14 DIAGNOSIS — O09.93 SUPERVISION OF HIGH RISK PREGNANCY IN THIRD TRIMESTER: Primary | ICD-10-CM

## 2022-11-14 LAB
ALT SERPL W P-5'-P-CCNC: 22 U/L (ref 0–50)
AST SERPL W P-5'-P-CCNC: 24 U/L (ref 0–45)
CREAT SERPL-MCNC: 0.65 MG/DL (ref 0.52–1.04)
CREAT UR-MCNC: 37 MG/DL
ERYTHROCYTE [DISTWIDTH] IN BLOOD BY AUTOMATED COUNT: 13.2 % (ref 10–15)
GFR SERPL CREATININE-BSD FRML MDRD: >90 ML/MIN/1.73M2
HCT VFR BLD AUTO: 37.7 % (ref 35–47)
HGB BLD-MCNC: 12.9 G/DL (ref 11.7–15.7)
MCH RBC QN AUTO: 29.8 PG (ref 26.5–33)
MCHC RBC AUTO-ENTMCNC: 34.2 G/DL (ref 31.5–36.5)
MCV RBC AUTO: 87 FL (ref 78–100)
PLATELET # BLD AUTO: 315 10E3/UL (ref 150–450)
PROT UR-MCNC: <0.05 G/L
PROT/CREAT 24H UR: NORMAL MG/G{CREAT}
RBC # BLD AUTO: 4.33 10E6/UL (ref 3.8–5.2)
SARS-COV-2 RNA RESP QL NAA+PROBE: NEGATIVE
T PALLIDUM AB SER QL: NONREACTIVE
WBC # BLD AUTO: 13.5 10E3/UL (ref 4–11)

## 2022-11-14 PROCEDURE — 76816 OB US FOLLOW-UP PER FETUS: CPT

## 2022-11-14 PROCEDURE — U0003 INFECTIOUS AGENT DETECTION BY NUCLEIC ACID (DNA OR RNA); SEVERE ACUTE RESPIRATORY SYNDROME CORONAVIRUS 2 (SARS-COV-2) (CORONAVIRUS DISEASE [COVID-19]), AMPLIFIED PROBE TECHNIQUE, MAKING USE OF HIGH THROUGHPUT TECHNOLOGIES AS DESCRIBED BY CMS-2020-01-R: HCPCS | Performed by: ADVANCED PRACTICE MIDWIFE

## 2022-11-14 PROCEDURE — G0463 HOSPITAL OUTPT CLINIC VISIT: HCPCS | Mod: 25

## 2022-11-14 PROCEDURE — 99213 OFFICE O/P EST LOW 20 MIN: CPT | Mod: 25 | Performed by: ADVANCED PRACTICE MIDWIFE

## 2022-11-14 PROCEDURE — 85027 COMPLETE CBC AUTOMATED: CPT | Performed by: ADVANCED PRACTICE MIDWIFE

## 2022-11-14 PROCEDURE — 82565 ASSAY OF CREATININE: CPT | Performed by: ADVANCED PRACTICE MIDWIFE

## 2022-11-14 PROCEDURE — 86780 TREPONEMA PALLIDUM: CPT | Performed by: ADVANCED PRACTICE MIDWIFE

## 2022-11-14 PROCEDURE — 86901 BLOOD TYPING SEROLOGIC RH(D): CPT | Performed by: ADVANCED PRACTICE MIDWIFE

## 2022-11-14 PROCEDURE — 76819 FETAL BIOPHYS PROFIL W/O NST: CPT

## 2022-11-14 PROCEDURE — 36415 COLL VENOUS BLD VENIPUNCTURE: CPT | Performed by: ADVANCED PRACTICE MIDWIFE

## 2022-11-14 PROCEDURE — 84450 TRANSFERASE (AST) (SGOT): CPT | Performed by: ADVANCED PRACTICE MIDWIFE

## 2022-11-14 PROCEDURE — 86850 RBC ANTIBODY SCREEN: CPT | Performed by: ADVANCED PRACTICE MIDWIFE

## 2022-11-14 PROCEDURE — 84156 ASSAY OF PROTEIN URINE: CPT | Performed by: ADVANCED PRACTICE MIDWIFE

## 2022-11-14 PROCEDURE — 76816 OB US FOLLOW-UP PER FETUS: CPT | Mod: 26 | Performed by: OBSTETRICS & GYNECOLOGY

## 2022-11-14 PROCEDURE — 84460 ALANINE AMINO (ALT) (SGPT): CPT | Performed by: ADVANCED PRACTICE MIDWIFE

## 2022-11-14 NOTE — PROGRESS NOTES
"Maternal fetal Medicine OB Follow up visit.     Jennifer El  : 1995  MRN: [id number]    CC: OB Follow-up    Subjective:  Jennifer El is a 27 year old  at 36w5d presenting for routine OB follow-up. Today, she is here with her  and mother, and is feeling well. She checks her blood pressure at home and is consistently in 110-20s/70-80s. Denies any s/s of preeclampsia. Having occasional BH contractions. Denies loss of fluid or vaginal bleeding. Reports fetal movement.     OB Hx:  OB History    Para Term  AB Living   1 0 0 0 0 0   SAB IAB Ectopic Multiple Live Births   0 0 0 0 0      # Outcome Date GA Lbr Yeison/2nd Weight Sex Delivery Anes PTL Lv   1 Current                  Objective:  BP (!) 149/98 (BP Location: Left arm, Patient Position: Sitting, Cuff Size: Adult Large)   Pulse 94   Resp 18   Wt 113.5 kg (250 lb 3.2 oz)   LMP 2022   SpO2 98%   BMI 37.10 kg/m    Gen: alert, oriented, NAD  Skin: warm, dry, intact  Respiratory: breathing unlabored, no SOB  Abdominal: gravid, non-tender  Pelvic: deferred  Extremities: +1/2 pedal edema  Psych: mood WNL, behavior WNL      OB Ultrasound:  Please see \"imaging\" tab under chart review for today's ultrasound results.      Assessment/Plan:  27 year old  at 36w5d here for follow OB visit.    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  -Desires to have Mom and  present at the time of delivery for support, aware that this " "will be approved by anesthesia at the time of delivery.  -Fetal MRI on 2022 did not show evidence of \"Nutmeg\" lung.  -Twice weekly BPP with hydrops assessment.  -Every 4 week assessments of fetal growth.     # Gestational hypertension  - At-home monitoring WNL. Currently asymptomatic of s/s of preeclampsia.   - Will collect weekly HELLP labs after visit.      #Routine PNC:  - Prenatal labs:   Rh: +  antibody: neg               HepB/HIV/RPR/HepC: nonreactive               UC: no growth               Rubella: immune                 GCT: pass    - Immunizations:  TDaP today  - Genetic screening: low-risk NIPT  - GBS negative    #Delivery planning:  - Primary c/s scheduled on  in main OR. Upcoming care conference scheduled later today to help finalize plans for .  - Pre-op labs and covid swab pending.  - Feeding: breastfeeding.         20 minutes spent on the date of the encounter, doing chart review, history and exam, documentation and further activities as noted.      Mariana Alvarado CNM on 2022 at 9:57 AM      "

## 2022-11-14 NOTE — TELEPHONE ENCOUNTER
Spoke with Jennifer to follow up on the care conference  today. Anesthesia has approved for Allie and pt's mother to accompany Jennifer in the OR and stay with her through recovery. PT very thankful for this update.   Reviewed Covid swab today is negative as well and will notify her if any other concerns when rest of labs are resulted. PT JESSY, has no further questions at this time.  Lou Beck RN

## 2022-11-14 NOTE — PROGRESS NOTES
The patient was seen for an ultrasound in the Maternal-Fetal Medicine Center at the [facility] today.  For a detailed report of the ultrasound examination, please see the ultrasound report which can be found under the imaging tab.    Pauline Farley MD  , OB/GYN  Maternal-Fetal Medicine  [phone] (Pager)

## 2022-11-14 NOTE — CARE CONFERENCE
This is protected health information and must be strictly protected for patient privacy. Violations will be followed up per compliance and HIPPA policies.    2022 Plan of Care for Jennifer El MRN: [id number] :  1995  Multidisciplinary Team care conference held 2022    Situation: Jennifer El is a 27 year old  at 36w5d by LMP c/w 5w6d US, EDC 22 who meets criteria for gestational hypertension and fetal hypoplastic left heart syndrome, MS, AA.     Back Ground:      HPI:   OBSTETRIC HISTORY:  Pregnancy Complications:  Gestational hypertension, home BP monitoring, recommend delivery 37 weeks     Medical History:    Surgical History:   Social History:   and her mother plan to be present for the birth, Maria Del Rosario Roche is NICU SW and patient has met with CV SW.    Assessment and Plan:       Fetal Well Being:    BMZ administered  and 22  twice weekly BPP    Followed Dr. Matson for her fetus being diagnosed with hypoplastic left heart syndrome, MS, AA. In the most recent echocardiogram demonstrated restrictive atrial septum.      Pulmonary venous doppler interrogation demonstrated continuous forward flow with an increased A-wave reversal (velocity time integral reverse/forward flow ~53%). This is concerning. In addition, there is no levocardinal decompressing vein demonstrated.      Requested the delivery to be scheduled near the cardiac OR and the cath lab and OR have been kept on standby for the baby.      Extensive discussion about the anatomy and physiology with the help of diagrams. Jennifer has read extensively and seems to be very appropriately informed about the possible complications. I discussed with her the possible need for ECMO, percutaneous or hybrid atrial septal decompression (balloon/stent) as well as possibility of hybrid PA bands and PDA stent. The exact interventions will be decided after delivery based on the hemodynamic status and needs. I went over  the procedure in detail with both Jennifer and her  including the anticipated complications. Both verbalized understanding, asked relevant questions and consented for the procedure. The consents were signed today to save time after delivery. Blood consents were also obtained as well.    Obstetric Plan/Surgery Plan:       section with scheduled in the [facility] Main OR 17 on  @ 0730 with secondary OR 18 (cath lab with ECMO standby, possible hybrid or percutaneous atrial septal stent, possible PA bands and PDA stent) and  OR 20 for  with hypoplastic left heart syndrome.     Patient to arrive 2 hours prior to schedule time    Pre-op shower instructions, COVID-19 testing plan, and NPO instructions on the unit the evening prior    Pre-op CS orders placed evening prior. If orders are not available, call the Senior Resident [phone].    Review of needs for Oxytocin, Hemabate, Misoprostol or Tranexamic acid in the room in the OR at the start of the surgery.  Avoid Methergine    Overall reminders to everyone on the care team.  Minimize conversations and extra chatter in the surgical suite.  Minimize extra people (limit to 20 if possible) in the room to minimize the risk of infection and door swings.     NICU to call OR Control Desk to determine when to set up the room.    OR team will call NICU for delivery when needed.     Recommend that extra team members and NICU and Peds Cardiology wait outside of the surgical suite until the anesthesia induction is complete.    Immediately transfer of baby via bassinette with warmed blankets and transwarmer to a pre-warmed Panda OR 18. No STS.    Cord gas collection - tbd    Plan for QBL in Main OR - OB RN will complete     Delayed cord clamping - yes    Anesthesia Plan: Consult   Assessment/Plan: Spinal      Perioperative Plan:   Provide primary circulator  Responsible for specimen collection other than umbilical cord for gases and cord blood    Nursing  Plan:     The Pre-Op RN will perform the usual pre-op procedures, review COVID-19 test results/screen on 3rd floor and notify L & D unit ([phone]) when the patient has arrived and is ready for fetal assessment prior to transfer to the OR.     The OB RN will perform the usual maternal and fetal assessments preoperatively, be present during the surgery and is responsible for delivery documentation, performing QBL, cord blood and cord gas collection, notification of the birth date and time to Birthplace unit and  identification.      The OB RN will bring a bassinette and the Panda warmer in the L & D OR#3 will be brought to the 3rd floor OR 18 and set up.  The warmer is stocked with C/S supplies,  resuscitation supplies, and doptone.  OB RN to review  CS in Main OR  checklist.    The OB RN is not the primary circulator.        Support person ( and her mother) plans to be present for the birth in the operating room.  OB RN responsible for assisting support persons out of the room in case of an emergency.  Support persons may NOT accompany baby to the OR however, they may stay in the OR with patient or be in the 3rd floor OR lounge.    NICU to call OR Control Desk to determine when to set up the room - between [phone].  OR team will call NICU for delivery when needed.     Review of needs for Oxytocin, Hemabate, Tranexamic acid and Misoprostol in the room in the OR at the start of the case. Avoid Methergine.    OB RN will verify that an infusion pump, uterotonics and Tranexamic Acid are available in the OR during the case.      Tthe NICU will immediately transfer the baby via bassinette with warmed blankets and transwarmer to a pre-warmed Panda OR 18. No STS.    The OB RN is responsible for completing the Delivery Summary with the exception of the  Apgar score and resuscitation notes.      Plan for OB RN to assist patient with breast manual expression/massage as patient is able in the PACU.  Please discuss the possibility of use of donor breast milk preoperatively and document the conversation.          [place] Plan:    , MFM, ECMO, OR nursing, anesthesia and cardiac teams to brief prior to surgery, 1pm Tuesday, meet in OR 19 to see the space and walk through the collaborative plan    Baby will be delivery at 37 weeks and 0 days     S/p BMZ  and 22    Plan for delayed cord clamping at delivery     NICU to call OR Control Desk to determine when to set up the room [phone].  OR team will call NICU for delivery when needed.     Recommend that extra team members and NICU wait outside of the surgical suite until the anesthesia induction is complete.     Support persons may NOT accompany baby to the OR however, they may stay in the OR with patient or be in the 3rd floor OR lounge.    The OB RN is responsible for completing the Delivery Summary with the exception of the  Apgar score and resuscitation notes      The NICU team will immediately transfer the baby via bassinette with warmed blankets and transwarmer to a pre-warmed Panda OR 18 to other awaiting NICU team and the pediatric cardiovascular team     NICU will immediately establish airway and lines.  Clear call-out communication to transfer medical care to the CV surgeon and anesthesia team after airway and lines are established.    Social Work:   NICU and CV SWContinue to support family in the postpartum recovery time.    Contact Phone #s and Pagers - place these numbers at the nurse s desk, sticky notes and on white board as needed    Maternal Fetal Medicine/OB Team  If any questions about medications or routine care please page the senior [facility] resident: [phone]   OB surgeon, Joleen Alberto MD  [facility] Fellow,   [facility] on service Joleen Alberto MD  Resident -[phone]  [facility] Patient Care Coordinator line: [phone]  Mariana Alvarado CNM  Medical Director, Elaine Ridley MD cell [phone]      Peds Cardiology and Anesthesia  Team  Myles Suarez MD, Pediatric Interventional Cardiologist, Pager: [phone], Office: [phone]; [email or web address]   Esa Bolton MD [email or web address]  Calvin Reyna MD [email or web address]  Sabi Fernandez, scheduling    Periop Team:  3rd floor Control Desk [phone]  PACU [phone]  Giovana Monteiro- Asst Head Nurse () for the Gyn/Urol Service Line [phone]  Mary Tracy- OR Perioperative Supervisor [phone] (Supervises the Gyn/Urol Service Line)  Adeola Spence- OR Perioperative Supervisor [phone] (Back up if Mary is unavailable)  Anisa Stanley -  Head Nurse [phone]  Grisel uMrry- Perianesthesia and Peds Sedation Nurse Manager [phone]  Haydee Godinez- OR Nurse Manager [phone]  Shaina Rodrigues- Perianesthesia Perioperative Supervisor [phone]  Mary Beyer- Perianesthesia Perioperative Supervisor [phone]  Amada Bardales- Director of Surgical Services [phone]  Sundeep Lacy Operating Room Support [phone]    Labor and Delivery Unit   Charge Nurse [place] [phone]  [phone] (to call from another campus [phone] and enter the Ascom #)  [place] [facility]   Charge Nurse [place] [phone] (to call from another campus [phone] and enter the Ascom #)  MADDIE Rashid, Birthplace [phone], cell [phone]  Joleen Beyer, [place] Labor and Delivery and Pregnancy Special Care Unit, [phone], cell [phone]  Jessica Baker PCS, Birthplace, [phone], cell [phone]  Inocencia Portillo or Beverley Palacio, IBCLC, [phone] for lactation support  Lenora Delatorre, [place] [facility], [phone], cell [phone]  Sonia Rojas, [facility], Birthplace [phone], cell [phone]  Anisa Uribe,  Director [phone] cell    Anesthesia/Surgery  Main Pager - [phone]   MD [email or web address]  [email or web address] Cell [phone]  Yony Vyas  [email or web address]   Pavan Bassettford - CRNA Lead [phone] [email or web address]    NICU Team  Bhavana Diloln MD Neonatology [facility] [phone]  Callie Melo, Lead NNP, [phone]   Gina Valenzuela, [facility] [phone]  Daniella Quiroga, [place] NICU 11th floor [phone]  Tamica Maya, [place], Med/Surg NICU  Steffany Olvera, [place], NICU Small Baby Unit [phone]  Lactation if  in NICU - [phone]    Support Services  Ngoc Garcia, [facility] Social Work [facility] ([phone], pager [phone])  Trnia Morton, [facility] Work [facility] ([phone], pager [phone])  Mariana Bolivar, [facility] Work [facility] ([phone], pager [phone])  Maria Del Rosario Roche, [facility] Social Work [facility] ([phone], pager [phone])  Bhavana Parish, [facility] Work [facility] ([phone], pager)  Chaplain Kieran (pager [phone])  Thai Rodriguez, Child and Family Life  for help with Care Space if needed  Gabby Longbehn, Director Adult Care ANS Team   Melanie Osuna, Manager of Patient Flow PPM team

## 2022-11-14 NOTE — NURSING NOTE
Jennifer seen in clinic today for Hydrops check, BPP and OB visit at 36w5d gestation for pregnancy complicated by GHTN and fetal HLHS (see report/notes). VS done, see flowsheet. Pt reports positive fetal movement. Pt denies bldg/lof/change in discharge/contractions/headache/vision changes/chest pain/SOB/edema. ERAS packet ans showering instructions reviewed, pre op soap and drink given. Dr. Farley reviewed US, Mariana Alvarado CNM met with pt, OBV done (see separate note). Plan for pre op and Pre E Labs today, c/s scheduled for 11/16/22 @ 1158. Will call pt after care conference today to review plan discussed.  Pt discharged stable and ambulatory.      Lou Beck RN

## 2022-11-16 ENCOUNTER — HOSPITAL ENCOUNTER (INPATIENT)
Facility: CLINIC | Age: 27
LOS: 4 days | Discharge: HOME HEALTH | End: 2022-11-20
Attending: OBSTETRICS & GYNECOLOGY | Admitting: OBSTETRICS & GYNECOLOGY
Payer: COMMERCIAL

## 2022-11-16 ENCOUNTER — ANESTHESIA (OUTPATIENT)
Dept: SURGERY | Facility: CLINIC | Age: 27
End: 2022-11-16
Payer: COMMERCIAL

## 2022-11-16 DIAGNOSIS — O13.3 GESTATIONAL HYPERTENSION, THIRD TRIMESTER: ICD-10-CM

## 2022-11-16 DIAGNOSIS — Z98.891 S/P CESAREAN SECTION: Primary | ICD-10-CM

## 2022-11-16 DIAGNOSIS — O35.BXX0 ANOMALY OF HEART OF FETUS AFFECTING PREGNANCY, ANTEPARTUM, SINGLE OR UNSPECIFIED FETUS: ICD-10-CM

## 2022-11-16 LAB — GLUCOSE BLDC GLUCOMTR-MCNC: 97 MG/DL (ref 70–99)

## 2022-11-16 PROCEDURE — 88307 TISSUE EXAM BY PATHOLOGIST: CPT | Mod: 26 | Performed by: PATHOLOGY

## 2022-11-16 PROCEDURE — 272N000001 HC OR GENERAL SUPPLY STERILE: Performed by: OBSTETRICS & GYNECOLOGY

## 2022-11-16 PROCEDURE — 250N000011 HC RX IP 250 OP 636: Performed by: OBSTETRICS & GYNECOLOGY

## 2022-11-16 PROCEDURE — 250N000009 HC RX 250: Performed by: STUDENT IN AN ORGANIZED HEALTH CARE EDUCATION/TRAINING PROGRAM

## 2022-11-16 PROCEDURE — 120N000002 HC R&B MED SURG/OB UMMC

## 2022-11-16 PROCEDURE — 370N000017 HC ANESTHESIA TECHNICAL FEE, PER MIN: Performed by: OBSTETRICS & GYNECOLOGY

## 2022-11-16 PROCEDURE — 59514 CESAREAN DELIVERY ONLY: CPT | Mod: GC | Performed by: OBSTETRICS & GYNECOLOGY

## 2022-11-16 PROCEDURE — 360N000076 HC SURGERY LEVEL 3, PER MIN: Performed by: OBSTETRICS & GYNECOLOGY

## 2022-11-16 PROCEDURE — 250N000011 HC RX IP 250 OP 636: Performed by: ANESTHESIOLOGY

## 2022-11-16 PROCEDURE — C9290 INJ, BUPIVACAINE LIPOSOME: HCPCS | Performed by: ANESTHESIOLOGY

## 2022-11-16 PROCEDURE — 88307 TISSUE EXAM BY PATHOLOGIST: CPT | Mod: TC | Performed by: OBSTETRICS & GYNECOLOGY

## 2022-11-16 PROCEDURE — 250N000013 HC RX MED GY IP 250 OP 250 PS 637: Performed by: OBSTETRICS & GYNECOLOGY

## 2022-11-16 PROCEDURE — 710N000010 HC RECOVERY PHASE 1, LEVEL 2, PER MIN: Performed by: OBSTETRICS & GYNECOLOGY

## 2022-11-16 PROCEDURE — 250N000011 HC RX IP 250 OP 636: Performed by: STUDENT IN AN ORGANIZED HEALTH CARE EDUCATION/TRAINING PROGRAM

## 2022-11-16 PROCEDURE — 250N000009 HC RX 250: Performed by: OBSTETRICS & GYNECOLOGY

## 2022-11-16 PROCEDURE — 250N000013 HC RX MED GY IP 250 OP 250 PS 637: Performed by: STUDENT IN AN ORGANIZED HEALTH CARE EDUCATION/TRAINING PROGRAM

## 2022-11-16 PROCEDURE — 999N000141 HC STATISTIC PRE-PROCEDURE NURSING ASSESSMENT: Performed by: OBSTETRICS & GYNECOLOGY

## 2022-11-16 PROCEDURE — 258N000003 HC RX IP 258 OP 636: Performed by: STUDENT IN AN ORGANIZED HEALTH CARE EDUCATION/TRAINING PROGRAM

## 2022-11-16 RX ORDER — LIDOCAINE 40 MG/G
CREAM TOPICAL
Status: DISCONTINUED | OUTPATIENT
Start: 2022-11-16 | End: 2022-11-16 | Stop reason: HOSPADM

## 2022-11-16 RX ORDER — ENOXAPARIN SODIUM 100 MG/ML
40 INJECTION SUBCUTANEOUS EVERY 24 HOURS
Status: DISCONTINUED | OUTPATIENT
Start: 2022-11-17 | End: 2022-11-20 | Stop reason: HOSPADM

## 2022-11-16 RX ORDER — MISOPROSTOL 200 UG/1
400 TABLET ORAL
Status: DISCONTINUED | OUTPATIENT
Start: 2022-11-16 | End: 2022-11-16 | Stop reason: HOSPADM

## 2022-11-16 RX ORDER — ACETAMINOPHEN 325 MG/1
975 TABLET ORAL ONCE
Status: COMPLETED | OUTPATIENT
Start: 2022-11-16 | End: 2022-11-16

## 2022-11-16 RX ORDER — NALOXONE HYDROCHLORIDE 0.4 MG/ML
0.4 INJECTION, SOLUTION INTRAMUSCULAR; INTRAVENOUS; SUBCUTANEOUS
Status: DISCONTINUED | OUTPATIENT
Start: 2022-11-16 | End: 2022-11-20 | Stop reason: HOSPADM

## 2022-11-16 RX ORDER — CEFAZOLIN SODIUM/WATER 2 G/20 ML
2 SYRINGE (ML) INTRAVENOUS SEE ADMIN INSTRUCTIONS
Status: DISCONTINUED | OUTPATIENT
Start: 2022-11-16 | End: 2022-11-16 | Stop reason: HOSPADM

## 2022-11-16 RX ORDER — METOCLOPRAMIDE HYDROCHLORIDE 5 MG/ML
10 INJECTION INTRAMUSCULAR; INTRAVENOUS EVERY 6 HOURS PRN
Status: DISCONTINUED | OUTPATIENT
Start: 2022-11-16 | End: 2022-11-20 | Stop reason: HOSPADM

## 2022-11-16 RX ORDER — ONDANSETRON 2 MG/ML
INJECTION INTRAMUSCULAR; INTRAVENOUS PRN
Status: DISCONTINUED | OUTPATIENT
Start: 2022-11-16 | End: 2022-11-16

## 2022-11-16 RX ORDER — SODIUM CHLORIDE, SODIUM LACTATE, POTASSIUM CHLORIDE, CALCIUM CHLORIDE 600; 310; 30; 20 MG/100ML; MG/100ML; MG/100ML; MG/100ML
INJECTION, SOLUTION INTRAVENOUS CONTINUOUS
Status: DISCONTINUED | OUTPATIENT
Start: 2022-11-16 | End: 2022-11-16 | Stop reason: HOSPADM

## 2022-11-16 RX ORDER — OXYTOCIN/0.9 % SODIUM CHLORIDE 30/500 ML
340 PLASTIC BAG, INJECTION (ML) INTRAVENOUS CONTINUOUS PRN
Status: DISCONTINUED | OUTPATIENT
Start: 2022-11-16 | End: 2022-11-20 | Stop reason: HOSPADM

## 2022-11-16 RX ORDER — ACETAMINOPHEN 325 MG/1
975 TABLET ORAL EVERY 6 HOURS
Status: DISCONTINUED | OUTPATIENT
Start: 2022-11-16 | End: 2022-11-20 | Stop reason: HOSPADM

## 2022-11-16 RX ORDER — TRANEXAMIC ACID 10 MG/ML
1 INJECTION, SOLUTION INTRAVENOUS EVERY 30 MIN PRN
Status: DISCONTINUED | OUTPATIENT
Start: 2022-11-16 | End: 2022-11-20 | Stop reason: HOSPADM

## 2022-11-16 RX ORDER — METHYLERGONOVINE MALEATE 0.2 MG/ML
200 INJECTION INTRAVENOUS
Status: DISCONTINUED | OUTPATIENT
Start: 2022-11-16 | End: 2022-11-20 | Stop reason: HOSPADM

## 2022-11-16 RX ORDER — OXYTOCIN/0.9 % SODIUM CHLORIDE 30/500 ML
PLASTIC BAG, INJECTION (ML) INTRAVENOUS CONTINUOUS PRN
Status: DISCONTINUED | OUTPATIENT
Start: 2022-11-16 | End: 2022-11-16

## 2022-11-16 RX ORDER — MISOPROSTOL 200 UG/1
800 TABLET ORAL
Status: DISCONTINUED | OUTPATIENT
Start: 2022-11-16 | End: 2022-11-16 | Stop reason: HOSPADM

## 2022-11-16 RX ORDER — NIFEDIPINE 30 MG/1
30 TABLET, EXTENDED RELEASE ORAL ONCE
Status: COMPLETED | OUTPATIENT
Start: 2022-11-16 | End: 2022-11-16

## 2022-11-16 RX ORDER — CITRIC ACID/SODIUM CITRATE 334-500MG
30 SOLUTION, ORAL ORAL
Status: COMPLETED | OUTPATIENT
Start: 2022-11-16 | End: 2022-11-16

## 2022-11-16 RX ORDER — CARBOPROST TROMETHAMINE 250 UG/ML
250 INJECTION, SOLUTION INTRAMUSCULAR
Status: DISCONTINUED | OUTPATIENT
Start: 2022-11-16 | End: 2022-11-16 | Stop reason: HOSPADM

## 2022-11-16 RX ORDER — MAGNESIUM HYDROXIDE 1200 MG/15ML
LIQUID ORAL PRN
Status: DISCONTINUED | OUTPATIENT
Start: 2022-11-16 | End: 2022-11-16 | Stop reason: HOSPADM

## 2022-11-16 RX ORDER — HYDROXYZINE HYDROCHLORIDE 25 MG/1
50 TABLET, FILM COATED ORAL EVERY 6 HOURS PRN
Status: DISCONTINUED | OUTPATIENT
Start: 2022-11-16 | End: 2022-11-20 | Stop reason: HOSPADM

## 2022-11-16 RX ORDER — ACETAMINOPHEN 325 MG/1
975 TABLET ORAL ONCE
Status: DISCONTINUED | OUTPATIENT
Start: 2022-11-16 | End: 2022-11-16

## 2022-11-16 RX ORDER — ONDANSETRON 4 MG/1
4 TABLET, ORALLY DISINTEGRATING ORAL EVERY 6 HOURS PRN
Status: DISCONTINUED | OUTPATIENT
Start: 2022-11-16 | End: 2022-11-20 | Stop reason: HOSPADM

## 2022-11-16 RX ORDER — SIMETHICONE 80 MG
80 TABLET,CHEWABLE ORAL 4 TIMES DAILY PRN
Status: DISCONTINUED | OUTPATIENT
Start: 2022-11-16 | End: 2022-11-20 | Stop reason: HOSPADM

## 2022-11-16 RX ORDER — KETOROLAC TROMETHAMINE 30 MG/ML
INJECTION, SOLUTION INTRAMUSCULAR; INTRAVENOUS PRN
Status: DISCONTINUED | OUTPATIENT
Start: 2022-11-16 | End: 2022-11-16

## 2022-11-16 RX ORDER — CALCIUM CARBONATE 500 MG/1
1 TABLET, CHEWABLE ORAL 2 TIMES DAILY
COMMUNITY
End: 2023-01-24

## 2022-11-16 RX ORDER — LIDOCAINE 4 G/G
2 PATCH TOPICAL
Status: DISCONTINUED | OUTPATIENT
Start: 2022-11-16 | End: 2022-11-20 | Stop reason: HOSPADM

## 2022-11-16 RX ORDER — BISACODYL 10 MG
10 SUPPOSITORY, RECTAL RECTAL DAILY PRN
Status: DISCONTINUED | OUTPATIENT
Start: 2022-11-18 | End: 2022-11-20 | Stop reason: HOSPADM

## 2022-11-16 RX ORDER — BUPIVACAINE HYDROCHLORIDE 2.5 MG/ML
INJECTION, SOLUTION EPIDURAL; INFILTRATION; INTRACAUDAL
Status: DISCONTINUED | OUTPATIENT
Start: 2022-11-16 | End: 2022-11-16

## 2022-11-16 RX ORDER — AMOXICILLIN 250 MG
2 CAPSULE ORAL 2 TIMES DAILY
Status: DISCONTINUED | OUTPATIENT
Start: 2022-11-16 | End: 2022-11-20 | Stop reason: HOSPADM

## 2022-11-16 RX ORDER — AMOXICILLIN 250 MG
1 CAPSULE ORAL 2 TIMES DAILY
Status: DISCONTINUED | OUTPATIENT
Start: 2022-11-16 | End: 2022-11-20 | Stop reason: HOSPADM

## 2022-11-16 RX ORDER — NIFEDIPINE 30 MG/1
60 TABLET, EXTENDED RELEASE ORAL DAILY
Status: DISCONTINUED | OUTPATIENT
Start: 2022-11-17 | End: 2022-11-18

## 2022-11-16 RX ORDER — OXYTOCIN/0.9 % SODIUM CHLORIDE 30/500 ML
340 PLASTIC BAG, INJECTION (ML) INTRAVENOUS CONTINUOUS PRN
Status: DISCONTINUED | OUTPATIENT
Start: 2022-11-16 | End: 2022-11-16 | Stop reason: HOSPADM

## 2022-11-16 RX ORDER — LIDOCAINE 40 MG/G
CREAM TOPICAL
Status: DISCONTINUED | OUTPATIENT
Start: 2022-11-16 | End: 2022-11-16

## 2022-11-16 RX ORDER — LIDOCAINE 40 MG/G
CREAM TOPICAL
Status: DISCONTINUED | OUTPATIENT
Start: 2022-11-16 | End: 2022-11-20 | Stop reason: HOSPADM

## 2022-11-16 RX ORDER — NALOXONE HYDROCHLORIDE 0.4 MG/ML
0.2 INJECTION, SOLUTION INTRAMUSCULAR; INTRAVENOUS; SUBCUTANEOUS
Status: DISCONTINUED | OUTPATIENT
Start: 2022-11-16 | End: 2022-11-20 | Stop reason: HOSPADM

## 2022-11-16 RX ORDER — PROCHLORPERAZINE 25 MG
25 SUPPOSITORY, RECTAL RECTAL EVERY 12 HOURS PRN
Status: DISCONTINUED | OUTPATIENT
Start: 2022-11-16 | End: 2022-11-20 | Stop reason: HOSPADM

## 2022-11-16 RX ORDER — FENTANYL CITRATE 50 UG/ML
INJECTION, SOLUTION INTRAMUSCULAR; INTRAVENOUS
Status: COMPLETED | OUTPATIENT
Start: 2022-11-16 | End: 2022-11-16

## 2022-11-16 RX ORDER — HYDROXYZINE HYDROCHLORIDE 25 MG/1
25 TABLET, FILM COATED ORAL EVERY 6 HOURS PRN
Status: DISCONTINUED | OUTPATIENT
Start: 2022-11-16 | End: 2022-11-20 | Stop reason: HOSPADM

## 2022-11-16 RX ORDER — OXYCODONE HYDROCHLORIDE 5 MG/1
5 TABLET ORAL EVERY 4 HOURS PRN
Status: DISCONTINUED | OUTPATIENT
Start: 2022-11-16 | End: 2022-11-20 | Stop reason: HOSPADM

## 2022-11-16 RX ORDER — HYDROCORTISONE 25 MG/G
CREAM TOPICAL 3 TIMES DAILY PRN
Status: DISCONTINUED | OUTPATIENT
Start: 2022-11-16 | End: 2022-11-20 | Stop reason: HOSPADM

## 2022-11-16 RX ORDER — CITRIC ACID/SODIUM CITRATE 334-500MG
15 SOLUTION, ORAL ORAL
Status: DISCONTINUED | OUTPATIENT
Start: 2022-11-16 | End: 2022-11-16

## 2022-11-16 RX ORDER — MISOPROSTOL 200 UG/1
400 TABLET ORAL
Status: DISCONTINUED | OUTPATIENT
Start: 2022-11-16 | End: 2022-11-20 | Stop reason: HOSPADM

## 2022-11-16 RX ORDER — KETOROLAC TROMETHAMINE 30 MG/ML
30 INJECTION, SOLUTION INTRAMUSCULAR; INTRAVENOUS EVERY 6 HOURS
Status: COMPLETED | OUTPATIENT
Start: 2022-11-16 | End: 2022-11-17

## 2022-11-16 RX ORDER — DEXTROSE, SODIUM CHLORIDE, SODIUM LACTATE, POTASSIUM CHLORIDE, AND CALCIUM CHLORIDE 5; .6; .31; .03; .02 G/100ML; G/100ML; G/100ML; G/100ML; G/100ML
INJECTION, SOLUTION INTRAVENOUS CONTINUOUS
Status: DISCONTINUED | OUTPATIENT
Start: 2022-11-16 | End: 2022-11-20 | Stop reason: HOSPADM

## 2022-11-16 RX ORDER — MODIFIED LANOLIN
OINTMENT (GRAM) TOPICAL
Status: DISCONTINUED | OUTPATIENT
Start: 2022-11-16 | End: 2022-11-20 | Stop reason: HOSPADM

## 2022-11-16 RX ORDER — NIFEDIPINE 30 MG/1
30 TABLET, EXTENDED RELEASE ORAL DAILY
Status: DISCONTINUED | OUTPATIENT
Start: 2022-11-16 | End: 2022-11-16

## 2022-11-16 RX ORDER — MISOPROSTOL 200 UG/1
800 TABLET ORAL
Status: DISCONTINUED | OUTPATIENT
Start: 2022-11-16 | End: 2022-11-20 | Stop reason: HOSPADM

## 2022-11-16 RX ORDER — SODIUM CHLORIDE, SODIUM LACTATE, POTASSIUM CHLORIDE, CALCIUM CHLORIDE 600; 310; 30; 20 MG/100ML; MG/100ML; MG/100ML; MG/100ML
INJECTION, SOLUTION INTRAVENOUS CONTINUOUS PRN
Status: DISCONTINUED | OUTPATIENT
Start: 2022-11-16 | End: 2022-11-16

## 2022-11-16 RX ORDER — MORPHINE SULFATE 1 MG/ML
INJECTION, SOLUTION EPIDURAL; INTRATHECAL; INTRAVENOUS
Status: COMPLETED | OUTPATIENT
Start: 2022-11-16 | End: 2022-11-16

## 2022-11-16 RX ORDER — IBUPROFEN 800 MG/1
800 TABLET, FILM COATED ORAL EVERY 6 HOURS
Status: DISCONTINUED | OUTPATIENT
Start: 2022-11-17 | End: 2022-11-20 | Stop reason: HOSPADM

## 2022-11-16 RX ORDER — OXYTOCIN 10 [USP'U]/ML
10 INJECTION, SOLUTION INTRAMUSCULAR; INTRAVENOUS
Status: DISCONTINUED | OUTPATIENT
Start: 2022-11-16 | End: 2022-11-20 | Stop reason: HOSPADM

## 2022-11-16 RX ORDER — CARBOPROST TROMETHAMINE 250 UG/ML
250 INJECTION, SOLUTION INTRAMUSCULAR
Status: DISCONTINUED | OUTPATIENT
Start: 2022-11-16 | End: 2022-11-20 | Stop reason: HOSPADM

## 2022-11-16 RX ORDER — METHYLERGONOVINE MALEATE 0.2 MG/ML
200 INJECTION INTRAVENOUS
Status: DISCONTINUED | OUTPATIENT
Start: 2022-11-16 | End: 2022-11-16 | Stop reason: HOSPADM

## 2022-11-16 RX ORDER — ONDANSETRON 2 MG/ML
4 INJECTION INTRAMUSCULAR; INTRAVENOUS EVERY 6 HOURS PRN
Status: DISCONTINUED | OUTPATIENT
Start: 2022-11-16 | End: 2022-11-20 | Stop reason: HOSPADM

## 2022-11-16 RX ORDER — PROCHLORPERAZINE MALEATE 10 MG
10 TABLET ORAL EVERY 6 HOURS PRN
Status: DISCONTINUED | OUTPATIENT
Start: 2022-11-16 | End: 2022-11-20 | Stop reason: HOSPADM

## 2022-11-16 RX ORDER — OXYTOCIN 10 [USP'U]/ML
10 INJECTION, SOLUTION INTRAMUSCULAR; INTRAVENOUS
Status: DISCONTINUED | OUTPATIENT
Start: 2022-11-16 | End: 2022-11-16 | Stop reason: HOSPADM

## 2022-11-16 RX ORDER — METOCLOPRAMIDE 10 MG/1
10 TABLET ORAL EVERY 6 HOURS PRN
Status: DISCONTINUED | OUTPATIENT
Start: 2022-11-16 | End: 2022-11-20 | Stop reason: HOSPADM

## 2022-11-16 RX ORDER — CEFAZOLIN SODIUM/WATER 2 G/20 ML
2 SYRINGE (ML) INTRAVENOUS
Status: COMPLETED | OUTPATIENT
Start: 2022-11-16 | End: 2022-11-16

## 2022-11-16 RX ADMIN — SODIUM CHLORIDE, POTASSIUM CHLORIDE, SODIUM LACTATE AND CALCIUM CHLORIDE: 600; 310; 30; 20 INJECTION, SOLUTION INTRAVENOUS at 07:48

## 2022-11-16 RX ADMIN — NIFEDIPINE 30 MG: 30 TABLET, FILM COATED, EXTENDED RELEASE ORAL at 14:56

## 2022-11-16 RX ADMIN — FENTANYL CITRATE 15 MCG: 50 INJECTION, SOLUTION INTRAMUSCULAR; INTRAVENOUS at 08:14

## 2022-11-16 RX ADMIN — ACETAMINOPHEN 975 MG: 325 TABLET, FILM COATED ORAL at 06:31

## 2022-11-16 RX ADMIN — KETOROLAC TROMETHAMINE 30 MG: 30 INJECTION, SOLUTION INTRAMUSCULAR at 09:28

## 2022-11-16 RX ADMIN — LIDOCAINE PATCH 4% 2 PATCH: 40 PATCH TOPICAL at 18:59

## 2022-11-16 RX ADMIN — BUPIVACAINE HYDROCHLORIDE 20 ML: 2.5 INJECTION, SOLUTION EPIDURAL; INFILTRATION; INTRACAUDAL at 09:35

## 2022-11-16 RX ADMIN — OXYTOCIN-SODIUM CHLORIDE 0.9% IV SOLN 30 UNIT/500ML 300 ML/HR: 30-0.9/5 SOLUTION at 08:36

## 2022-11-16 RX ADMIN — SODIUM CITRATE AND CITRIC ACID MONOHYDRATE 30 ML: 500; 334 SOLUTION ORAL at 07:13

## 2022-11-16 RX ADMIN — BUPIVACAINE 20 ML: 13.3 INJECTION, SUSPENSION, LIPOSOMAL INFILTRATION at 09:35

## 2022-11-16 RX ADMIN — HYDROXYZINE HYDROCHLORIDE 25 MG: 25 TABLET, FILM COATED ORAL at 22:54

## 2022-11-16 RX ADMIN — ACETAMINOPHEN 975 MG: 325 TABLET, FILM COATED ORAL at 18:57

## 2022-11-16 RX ADMIN — ACETAMINOPHEN 975 MG: 325 TABLET, FILM COATED ORAL at 12:27

## 2022-11-16 RX ADMIN — SENNOSIDES AND DOCUSATE SODIUM 1 TABLET: 50; 8.6 TABLET ORAL at 20:52

## 2022-11-16 RX ADMIN — NIFEDIPINE 30 MG: 30 TABLET, FILM COATED, EXTENDED RELEASE ORAL at 16:38

## 2022-11-16 RX ADMIN — OXYCODONE HYDROCHLORIDE 5 MG: 5 TABLET ORAL at 16:38

## 2022-11-16 RX ADMIN — KETOROLAC TROMETHAMINE 30 MG: 30 INJECTION, SOLUTION INTRAMUSCULAR at 22:55

## 2022-11-16 RX ADMIN — Medication 0.15 MG: at 08:14

## 2022-11-16 RX ADMIN — KETOROLAC TROMETHAMINE 30 MG: 30 INJECTION, SOLUTION INTRAMUSCULAR at 16:42

## 2022-11-16 RX ADMIN — OXYCODONE HYDROCHLORIDE 5 MG: 5 TABLET ORAL at 12:27

## 2022-11-16 RX ADMIN — SIMETHICONE 80 MG: 80 TABLET, CHEWABLE ORAL at 12:27

## 2022-11-16 RX ADMIN — SIMETHICONE 80 MG: 80 TABLET, CHEWABLE ORAL at 19:06

## 2022-11-16 RX ADMIN — ONDANSETRON 4 MG: 2 INJECTION INTRAMUSCULAR; INTRAVENOUS at 08:22

## 2022-11-16 RX ADMIN — OXYCODONE HYDROCHLORIDE 5 MG: 5 TABLET ORAL at 20:52

## 2022-11-16 RX ADMIN — PHENYLEPHRINE HYDROCHLORIDE 50 MCG/MIN: 10 INJECTION INTRAVENOUS at 07:55

## 2022-11-16 RX ADMIN — Medication 2 G: at 08:05

## 2022-11-16 ASSESSMENT — ACTIVITIES OF DAILY LIVING (ADL)
ADLS_ACUITY_SCORE: 22
ADLS_ACUITY_SCORE: 22
ADLS_ACUITY_SCORE: 18
ADLS_ACUITY_SCORE: 22
ADLS_ACUITY_SCORE: 18
ADLS_ACUITY_SCORE: 22

## 2022-11-16 NOTE — PLAN OF CARE
Problem: Plan of Care - These are the overarching goals to be used throughout the patient stay.    Goal: Optimal Comfort and Wellbeing  Outcome: Progressing  Intervention: Monitor Pain and Promote Comfort  Recent Flowsheet Documentation  Taken 11/16/2022 1227 by Blanche aFrias RN  Pain Management Interventions: medication (see MAR)   Goal Outcome Evaluation:      Plan of Care Reviewed With: patient  BP's elevated, Procardia 30mg given. Tolerating regular diet. Ambulated to bathroom, [place] discontinued, tolerated well. Able to ambulate back to bed.

## 2022-11-16 NOTE — ANESTHESIA PROCEDURE NOTES
"Intrathecal injection Procedure Note    Pre-Procedure   Staff -        Anesthesiologist:  Ephraim Bahena MD       Resident/Fellow: Isidro Gómez MD       Performed By: resident       Location: OR       Procedure Start/Stop Times: 11/16/2022 8:14 AM       Pre-Anesthestic Checklist: patient identified, IV checked, risks and benefits discussed, informed consent, monitors and equipment checked, pre-op evaluation, at physician/surgeon's request and post-op pain management  Timeout:       Correct Patient: Yes        Correct Procedure: Yes        Correct Site: Yes        Correct Position: Yes   Procedure Documentation  Procedure: intrathecal injection       Diagnosis: labor analgesia       Patient Position: sitting       Patient Prep/Sterile Barriers: sterile gloves, mask, patient draped       Skin prep: Chloraprep       Insertion Site: L3-4. (midline approach).       Needle Gauge: 25.        Needle Length (Inches): 3.5        Spinal Needle Type: Pencan       Introducer used       Introducer: 20 G       # of attempts: 2 and  # of redirects:  1    Assessment/Narrative         Sensory Level: T6    Medication(s) Administered   Fentanyl PF (Intrathecal) - Intrathecal   15 mcg - 11/16/2022 8:14:00 AM  Morphine PF 1 mg/mL (Intrathecal) - Intrathecal   0.15 mg - 11/16/2022 8:14:00 AM   Comments:  Scoliosis present, initial insertion site kept causing right sided discomfort. Adjusted to left with success.       FOR [facility] ([facility]) ONLY:   Pain Team Contact information: please page the Pain Team Via [employer]. Search \"Pain\". During daytime hours, please page the attending first. At night please page the resident first.    "

## 2022-11-16 NOTE — PLAN OF CARE
delivery of boy at 0834 with Dr. Alberto and Dr. Uribe in attendance. NICU and pediatric team for known left hypoplastic heart. Placenta removed without complication. . TAP performed in OR. Mother transferred to L+D PACU in stable condition.

## 2022-11-16 NOTE — PROGRESS NOTES
I had the pleasure of seeing Jennifer El at the [facility] on 11/10/2022 in prenatal CV surgery consultation  She presented today accompanied by her  and mother.   The multidisciplinary plan and decision algorithm was discussed.   All questions were addressed.    Plan:   urgent balloon septostomy, with consideration for hybrid stage 1 palliation (bilateral PA Bands)  for HLHS.  VA ECMO if baby unstable.           I spent approximately 30 minutes for this discussion.

## 2022-11-16 NOTE — PLAN OF CARE
Data: Jennifer El transferred to 7132 via cart at 1110. Baby in CVICU.  Action: Receiving unit notified of transfer: Yes. Patient and family notified of room change. Report given to Blanche STAHL at 1030. Belongings sent to receiving unit. Accompanied by Registered Nurse. Oriented patient to surroundings. Call light within reach.   Response: Patient tolerated transfer and is stable.

## 2022-11-16 NOTE — ANESTHESIA PREPROCEDURE EVALUATION
Anesthesia Pre-Procedure Evaluation    Patient: Jennifer El   MRN: [id number] : 1995        Procedure : Procedure(s):   SECTION          Past Medical History:   Diagnosis Date     Migraine2014      History reviewed. No pertinent surgical history.   No Known Allergies   Social History     Tobacco Use     Smoking status: Never     Passive exposure: Never     Smokeless tobacco: Never   Substance Use Topics     Alcohol use: Not Currently      Wt Readings from Last 1 Encounters:   22 114 kg (251 lb 5.2 oz)        Anesthesia Evaluation   Pt has not had prior anesthetic         ROS/MED HX  ENT/Pulmonary:  - neg pulmonary ROS     Neurologic:  - neg neurologic ROS     Cardiovascular: Comment: Gestational hypertension      METS/Exercise Tolerance:     Hematologic:  - neg hematologic  ROS     Musculoskeletal:       GI/Hepatic:  - neg GI/hepatic ROS     Renal/Genitourinary:       Endo:  - neg endo ROS     Psychiatric/Substance Use:  - neg psychiatric ROS     Infectious Disease:       Malignancy:       Other:    at 37w0d scheduled for  for  with HPLH syndrome             OUTSIDE LABS:  CBC:   Lab Results   Component Value Date    WBC 13.5 (H) 2022    WBC 14.4 (H) 2022    HGB 12.9 2022    HGB 12.4 2022    HCT 37.7 2022    HCT 36.4 2022     2022     2022     BMP:   Lab Results   Component Value Date    CR 0.65 2022    CR 0.66 2022    GLC 97 2022     COAGS: No results found for: PTT, INR, FIBR  POC:   Lab Results   Component Value Date    HCG Negative 2021    HCGS Positive (A) 2022     HEPATIC:   Lab Results   Component Value Date    ALT 22 2022    AST 24 2022     OTHER:   Lab Results   Component Value Date    A1C 5.1 2017    TSH 1.83 2022       Anesthesia Plan    ASA Status:  2   NPO Status:  NPO Appropriate    Anesthesia Type: Spinal.              Consents    Anesthesia  Plan(s) and associated risks, benefits, and realistic alternatives discussed. Questions answered and patient/representative(s) expressed understanding.     - Discussed: Risks, Benefits and Alternatives for BOTH SEDATION and the PROCEDURE were discussed     - Discussed with:  Patient      - Extended Intubation/Ventilatory Support Discussed: No.      - Patient is DNR/DNI Status: No    Use of blood products discussed: Yes.     - Discussed with: Patient.     - Consented: consented to blood products            Reason for refusal: other.     Postoperative Care    Pain management: Oral pain medications, intrathecal morphine, Neuraxial analgesia.   PONV prophylaxis: Ondansetron (or other 5HT-3)     Comments:                Harvey Mcclain

## 2022-11-16 NOTE — PROVIDER NOTIFICATION
11/16/22 1316   Provider Notification   Provider Name/Title Dr. Razo   Method of Notification Electronic Page   Request Evaluate-Remote   Notification Reason Vital Signs Change   VW, /104 at 1225, previous BP's were 142/98, 131/94 since admission to [facility].

## 2022-11-16 NOTE — PROVIDER NOTIFICATION
22 1354   Provider Notification   Provider Name/Title Dr. Jones   Method of Notification Electronic Page   Request Evaluate-Remote   Notification Reason Vital Signs Change;Other   VW, BP's elevated since admission to [facility]. Pt wants to visit  in CVICU and is still on freq VS, is this ok?    Dr. Jones returns page, ok for patient to visit  and will start procardia.

## 2022-11-16 NOTE — ANESTHESIA PROCEDURE NOTES
TAP Procedure Note    Pre-Procedure   Staff -        Anesthesiologist:  Ephraim Bahena MD       Resident/Fellow: Isidro Gómez MD       Performed By: resident       Location: OR       Procedure Start/Stop Times: 11/16/2022 9:35 AM       Pre-Anesthestic Checklist: patient identified, IV checked, site marked, risks and benefits discussed, informed consent, monitors and equipment checked, pre-op evaluation, at physician/surgeon's request and post-op pain management  Timeout:       Correct Patient: Yes        Correct Procedure: Yes        Correct Site: Yes        Correct Position: Yes        Correct Laterality: Yes        Site Marked: Yes  Procedure Documentation  Procedure: TAP       Diagnosis: POST OPERATIVE PAIN       Laterality: bilateral       Patient Position: supine       Patient Prep/Sterile Barriers: sterile gloves, mask       Skin prep: Chloraprep       Needle Type: short bevel       Needle Gauge: 21.        Needle Length (millimeters): 110        Ultrasound guided       1. Ultrasound was used to identify targeted nerve, plexus, vascular marker, or fascial plane and place a needle adjacent to it in real-time.       2. Ultrasound was used to visualize the spread of anesthetic in close proximity to the above referenced structure.       3. A permanent image is entered into the patient's record.       4. The visualized anatomic structures appeared normal.       5. There were no apparent abnormal pathologic findings.    Assessment/Narrative         The placement was negative for: blood aspirated, painful injection and site bleeding       Paresthesias: No.       Bolus given via needle..        Secured via.        Insertion/Infusion Method: Single Shot       Complications: none       Injection made incrementally with aspirations every 5 mL.    Medication(s) Administered   Bupivacaine 0.25% PF (Infiltration) - Infiltration   20 mL - 11/16/2022 9:35:00 AM  Bupivacaine liposome (Exparel) 1.3% LA inj  "susp (Infiltration) - Infiltration   20 mL - 11/16/2022 9:35:00 AM    FOR [facility] ONLY:   Pain Team Contact information: please page the Pain Team Via [employer]. Search \"Pain\". During daytime hours, please page the attending first. At night please page the resident first.    "

## 2022-11-16 NOTE — DISCHARGE SUMMARY
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 Jennifer El is a 27 year old  at 37w0d who was admitted on 2022 for scheduled primary CS given fetal HLHS.    Please see her admit H&P for full details of her PMH, PSH, Meds, Allergies and exam on admit.    Hospital course:  After discussion of risk and and benefits and signing informed consent the patient was taken to the operating room for primary  section.    QBL from the delivery was 410 mL. Operative findings include:  -Single, liveborn male infant at 0834 hours on 2022. Nuchal x1. Apgars pending and Birth weight: pending.  Fetal presentation: Vertex. Amniotic fluid: Clear.     -Placenta intact with 3 vessel cord.     -Normal appearing uterus, fallopian tubes, ovaries.   -No adhesions  -Good uterine tone.    Please see her  Section Operative Note for full details regarding her delivery.    Her postoperative course was uncomplicated. For her hypertension she was started on Procardia which was titrated to 120 mg daily. On POD#4, she was meeting all of her postpartum goals and deemed stable for discharge. She was voiding without difficulty, tolerating a regular diet without nausea and vomiting, her pain was well controlled on oral pain medicines and her lochia was appropriate. Her hemoglobin prior to delivery was 12.9 and after delivery was 11.5. Her Rh status was pos and Rhogam was not indicated.    Discharge Medications:     Review of your medicines        START taking        Dose / Directions   acetaminophen 325 MG  tablet  Commonly known as: TYLENOL  Used for: S/P  section      Dose: 650-975 mg  Take 2-3 tablets (650-975 mg) by mouth every 6 hours as needed for mild pain Start after Delivery.  Quantity: 100 tablet  Refills: 0     ibuprofen 200 MG tablet  Commonly known as: ADVIL/MOTRIN  Used for: S/P  section      Dose: 600 mg  Take 3 tablets (600 mg) by mouth every 6 hours as needed for moderate pain (4-6) Start after delivery  Refills: 0     NIFEdipine ER 60 MG 24 hr tablet  Commonly known as: ADALAT CC  Used for: S/P  section      Dose: 120 mg  Take 2 tablets (120 mg) by mouth daily for 30 days  Quantity: 60 tablet  Refills: 1     norethindrone 0.35 MG tablet  Commonly known as: MICRONOR  Used for: S/P  section      Dose: 0.35 mg  Take 1 tablet (0.35 mg) by mouth daily  Quantity: 96 tablet  Refills: 4     oxyCODONE 5 MG tablet  Commonly known as: ROXICODONE  Used for: S/P  section      Dose: 5 mg  Take 1 tablet (5 mg) by mouth every 6 hours as needed for breakthrough pain  Quantity: 8 tablet  Refills: 0     senna-docusate 8.6-50 MG tablet  Commonly known as: SENOKOT-S/PERICOLACE  Used for: S/P  section      Dose: 1 tablet  Take 1 tablet by mouth daily Start after delivery.  Quantity: 100 tablet  Refills: 0     simethicone 80 MG chewable tablet  Commonly known as: MYLICON  Used for: S/P  section      Dose: 80 mg  Take 1 tablet (80 mg) by mouth 4 times daily as needed for other (gas)  Quantity: 30 tablet  Refills: 0            CONTINUE these medicines which have NOT CHANGED        Dose / Directions   calcium carbonate 500 MG chewable tablet  Commonly known as: TUMS      Dose: 1 chew tab  Take 1 chew tab by mouth 2 times daily  Refills: 0     IRON SUPPLEMENT PO      Dose: 1 tablet  Take 1 tablet by mouth daily  Refills: 0     magnesium citrate solution (NOT currently available)      Dose: 296 mL  Take 296 mLs by mouth once  Refills: 0     ondansetron 8 MG tablet  Commonly  known as: ZOFRAN      Dose: 8 mg  8 mg 3 times daily as needed  Refills: 0     Prenatal Vitamin and Mineral 28-0.8 MG Tabs      Refills: 0     UNISOM PO      Dose: 1 tablet  Take 1 tablet by mouth daily  Refills: 0     Vitamin D (Cholecalciferol) 25 MCG (1000 UT) Tabs      Dose: 4,000 Units  Take 4,000 Units by mouth daily  Refills: 0            STOP taking      progesterone 200 MG capsule  Commonly known as: PROMETRIUM                  Where to get your medicines        These medications were sent to [place], [place] - [address]  [address] [facility] [address] [address]      Phone: [phone]   NIFEdipine ER 60 MG 24 hr tablet  norethindrone 0.35 MG tablet  oxyCODONE 5 MG tablet  senna-docusate 8.6-50 MG tablet  simethicone 80 MG chewable tablet       Some of these will need a paper prescription and others can be bought over the counter. Ask your nurse if you have questions.    You don't need a prescription for these medications  acetaminophen 325 MG tablet  ibuprofen 200 MG tablet         Discharge/Disposition:  Jennifer El was discharged to home in stable condition with the following instructions/medications:  1) Call for temperature > 100.4, bright red vaginal bleeding >1 pad an hour x 2 hours, foul smelling vaginal discharge, pain not controlled by usual oral pain meds, persistent nausea and vomiting not controlled on medications, drainage or redness from incision site  2) She desired POPs for contraception.  3) For feeding she decided to breastfeed.  4) She was instructed to follow-up with her primary OB in 6 weeks for a routine postpartum visit and within 1 week for a blood pressure check.  5) Discharge activity: No heavy lifting >15 lbs or strenuous activity for 6 weeks, pelvic rest for 6 weeks, no driving or operating machinery while on narcotics.    Rajwinder Thompson MD  OB/GYN PGY-1    I saw and evaluated this patient prior to discharge. I discussed the patient  with the resident and agree with plan of care as documented in the resident note.   I personally reviewed vital signs, medications, labs, and imaging.   I personally spent 10 minutes on discharge activities.  Gaby Fitzpatrick MD

## 2022-11-16 NOTE — PROVIDER NOTIFICATION
22 1350   Provider Notification   Provider Name/Title Dr. Razo   Method of Notification Electronic Page   Request Evaluate-Remote   Notification Reason Other   VW, are you ok with patient visiting  in CVICU? Pt is still on hourly vital signs.   Page returned, informed to page G3.

## 2022-11-16 NOTE — H&P
History and Physical   2022  Jennifer El  [id number]      HPI: Jennifer El is a 27 year old  at 37w0d by LMP c/w 5w6d US who presents for scheduled c/s. She's feeling well today.    She denies vaginal bleeding or loss of fluid and is feeling normal fetal movement.     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 No pertinent surgical history.    Medications:   No current facility-administered medications on file prior to encounter.  calcium carbonate (TUMS) 500 MG chewable tablet, Take 1 chew tab by mouth 2 times daily  Doxylamine Succinate, Sleep, (UNISOM PO), Take 1 tablet by mouth daily  Ferrous Sulfate (IRON SUPPLEMENT PO), Take 1 tablet by mouth daily  magnesium citrate solution (NOT currently available), Take 296 mLs by mouth once  ondansetron (ZOFRAN) 8 MG tablet, 8 mg 3 times daily as needed  Prenatal Vit-Fe Fumarate-FA (PRENATAL VITAMIN AND MINERAL) 28-0.8 MG TABS,   progesterone (PROMETRIUM) 200 MG capsule, Take 200 mg by mouth 2 times daily  Vitamin D (Cholecalciferol) 25 MCG (1000 UT) TABS, Take 4,000 Units by mouth daily      Allergies:  No Known Allergies    FamilyHX:    Family History   Adopted: Yes   Problem Relation Age of Onset     Unknown/Adopted No family hx of      Denies bleeding or clotting disorders in the family    SocialHX:   Denies drinking, smoking, drug use  "in pregnancy    ROS: 10-point ROS negative except as indicated in HPI.    Physical Exam:  Vitals:    22 0552   BP: (!) 154/108   Pulse: 104   SpO2: 98%   Weight: 114 kg (251 lb 5.2 oz)   Height: 1.749 m (5' 8.86\")     General: alert, oriented female, resting in bed in NAD  CV: regular rate and rhythm  Abdomen: soft, gravid, non-tender  Extremities: bilateral lower extremities non-tender without edema    FHT: to be performed prior to OR  Prenatal Labs:      Lab Results   Component Value Date    AS Negative 2022    HEPBANG Nonreactive 2022    HGB 12.9 2022       GBS Status: GBS negative  No results found for: GBS    Assessment: 27 year old  at 37w0d by LMP c/w 5w6d US, here for scheduled cs. Her pregnancy is complicated by fetal HLHS with intact atrial septum and gHTN.    Plan:    #Scheduled Primary  Section  Indicated due to fetal HLHS. No prior abdominal surgeries. Will plan for a Pfannenstiel skin incision with low transverse hysterotomy  - Labs: CBC, T&S, RPR  - Pre-op Hgb 12.9, Plts 315  - T&C for 2U  - Placenta: posterior  - Anesthesia: Spinal   - 2g Ancef   - PPH Meds/Ppx: avoid methergine  - Diet: NPO  - PPx: SCDs  - Consent: Discussed risks and benefits of procedure, including but not limited to bleeding, infection, injury to surrounding organs, injury to infant, and the potential need for another surgery should some injury go unrecognized or patient were to have continued bleeding. Patient had time to ask questions and expressed understanding of procedure and associated risks. Agreed to blood transfusion if necessary. Consent signed. Patient additionally consented for blood transfusion.    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  - Fetal MRI on 2022 did not show evidence of \"Nutmeg\" lung.  - Twice weekly BPP without evidence of hydrops, last  was 8/8, ceph, MVP 3.3 cm.  -Every 4 week assessments of fetal growth- last 11/10 EFW 2692g 34%ile    # PNC:  - Prenatal labs:   Rh: +  antibody: neg               HepB/HIV/RPR/HepC: nonreactive               UC: no growth               Rubella: immune                 GCT: 89 (pass)  - Immunizations: s/p TDaP, no flu  - Genetic screening: low-risk NIPT  - GBS negative  - Pap last 2016 NILM- due for repeat pp  - Feeding: breast     # FWB:   - Continuous Fetal Monitoring  - NICU, peds cards and CV surgery for delivery   - Intrauterine resuscitative measures prn    The patient was discussed with Dr. Alberto who is in agreement with the treatment plan.    Ngoc Jones MD  ObGyn Resident, PGY-3  2022 6:48 AM     Physician Attestation   I saw this patient with the resident and agree with the resident/fellow's findings and plan of care as documented in the note.      I personally reviewed vital signs, medications, labs and imaging.    Key findings: 26 yo G1 at 37w0d pregnancy complicated by GHTN and fetal HLHS with intact atrial septum. Plan for primary  section today to allow for immediate care of the baby by Neonatology, CV surgery and cardiology teams immediately following delivery.    Joleen Alberto MD  Date of Service (when I saw the patient): 22  "

## 2022-11-16 NOTE — BRIEF OP NOTE
[facility]   BRIEF OPERATIVE NOTE:  SECTION     Surgery Date:  2022   Surgeon(s): Joleen Alberto MD  Assistants:  Ngoc Jones MD PGY-3, Gloria Uribe MD PGY-6    Preoperative Diagnoses:  - at 37w0d   -gHTN  -Fetal HLHS    Postoperative diagnoses:  - at 37w0d, now delivered    Procedure performed:  Primary low segment transverse  section via Pfannenstiel skin incision with double layer uterine closure    Anesthesia:  Spinal with duramorph  QBL: 410 mL  Fluid replacement: 1200 mL crystalloid.   UOP: 250 mL  Specimens: Placenta, cord blood for genetics  Complications: None apparent      Operative findings:   -Single, liveborn male infant at 0834 hours on 2022. Nuchal x1. Apgars pending and Birth weight: pending.  Fetal presentation: Vertex. Amniotic fluid: Clear.     -Placenta intact with 3 vessel cord.     -Normal appearing uterus, fallopian tubes, ovaries.   -No adhesions  -Good uterine tone.       Transferred to PACU in stable condition.     Ngoc Jones MD  ObGyn Resident, PGY-3  2022 7:00 AM

## 2022-11-16 NOTE — OP NOTE
[facility]  Full Operative Progress Note     Surgery Date:  2022   Surgeon(s): Joleen Alberto MD  Assistants:  Ngoc Jones MD PGY-3, Gloria Uribe MD PGY-6, Judi Acevedo MS4     Preoperative Diagnoses:  - at 37w0d   -gHTN  -Fetal HLHS with intact atrial septum     Postoperative diagnoses:  - at 37w0d, now delivered  -gHTN  -Fetal HLHS with intact atrial septum     Procedure performed:  Primary low segment transverse  section via Pfannenstiel skin incision with double layer uterine closure     Anesthesia:  Spinal with duramorph  QBL: 410 mL  Fluid replacement: 1200 mL crystalloid.   UOP: 250 mL  Specimens: Placenta, cord blood for genetics  Complications: None apparent       Operative findings:   -Single, liveborn male infant at 0834 hours on 2022. Nuchal x1. Apgars pending and Birth weight: pending.  Fetal presentation: Vertex. Amniotic fluid: Clear.     -Placenta intact with 3 vessel cord.     -Normal appearing uterus, fallopian tubes, ovaries.   -No adhesions  -Good uterine tone.     Indications:   Jennifer El is a 27 year old  at 37w0d admitted for scheduled primary cs for fetal hypoplastic left heart syndrome.  The risks, benefits, and alternatives of  section were discussed with the patient, and she agreed to proceed.     Procedure Details:   The patient was brought to the OR, where adequate spinal anesthesia was administered.  She was placed in the dorsal supine position with a slight leftward tilt. She was prepped and draped in the usual sterile fashion. A surgical time out was performed. A pfannenstiel skin incision was made with the scalpel, and carried down to the underlying fascia with sharp and blunt dissection. The fascia was incised in the midline, and the incision was extended laterally and dissected off of the underlying rectus muscles with blunt dissection. The rectus muscles were  in the midline, and  the peritoneum was entered bluntly, and the opening was extended with digital pressure. The bladder blade was placed. A transverse hysterotomy was made with the scalpel in the lower uterine segment, and the incision was extended with digital pressure. The infant was noted to be in the vertex position. The infant was delivered through the incision without difficulty onto the table where it was well suctioned, and after 30 second delay the cord double clamped and cut, and handed off to waiting pediatric staff.    The placenta was delivered with gentle traction on the umbilical cord and uterine massage. The uterus was exteriorized and cleared of all clots and debris. Uterine tone was noted to be firm with 30 units of pitocin given through the running IV and uterine massage.  The hysterotomy was closed with a running locked suture of 0 Vicryl.  The hysterotomy was then imbricated using an 0 Monocryl suture. The hysterotomy was noted to be hemostatic.     The posterior cul-de-sac was cleared of all clots and debris. The uterus was returned to the abdomen. The pericolic gutters were cleared of all clots and debris. The hysterotomy was reexamined and noted to be hemostatic. The fascia and rectus muscles were examined and areas of oozing were controlled with electrocautery. The fascia was closed with a running 0 Vicryl suture. The subcutaneous tissue was irrigated and areas of oozing were controlled with electrocautery. The subcutaneous tissue was greater than 2 cm in thickness, and was therefore reapproximated with 2-0 Plain gut. The skin was closed with 4-0 Monocryl and covered with a sterile dressing.    All sponge, needle, and instrument counts were correct. The patient tolerated the procedure well, and was transferred to recovery in stable condition. Dr. Alberto was present and scrubbed for the entirety of the procedure.     Ngoc Jones MD  OB/GYN Resident PGY-3  11/16/2022 3:57 PM     Physician Attestation   I  was present for the entire procedure between opening and closing.    Joleen Alberto MD  Date of Service (when I saw the patient): 11/16/22

## 2022-11-17 ENCOUNTER — DOCUMENTATION ONLY (OUTPATIENT)
Dept: PEDIATRIC CARDIOLOGY | Facility: CLINIC | Age: 27
End: 2022-11-17

## 2022-11-17 LAB — HGB BLD-MCNC: 11.5 G/DL (ref 11.7–15.7)

## 2022-11-17 PROCEDURE — 120N000002 HC R&B MED SURG/OB UMMC

## 2022-11-17 PROCEDURE — 250N000011 HC RX IP 250 OP 636: Performed by: STUDENT IN AN ORGANIZED HEALTH CARE EDUCATION/TRAINING PROGRAM

## 2022-11-17 PROCEDURE — 250N000013 HC RX MED GY IP 250 OP 250 PS 637: Performed by: STUDENT IN AN ORGANIZED HEALTH CARE EDUCATION/TRAINING PROGRAM

## 2022-11-17 PROCEDURE — 85018 HEMOGLOBIN: CPT | Performed by: STUDENT IN AN ORGANIZED HEALTH CARE EDUCATION/TRAINING PROGRAM

## 2022-11-17 PROCEDURE — 36415 COLL VENOUS BLD VENIPUNCTURE: CPT | Performed by: STUDENT IN AN ORGANIZED HEALTH CARE EDUCATION/TRAINING PROGRAM

## 2022-11-17 RX ADMIN — ACETAMINOPHEN 975 MG: 325 TABLET, FILM COATED ORAL at 07:49

## 2022-11-17 RX ADMIN — OXYCODONE HYDROCHLORIDE 5 MG: 5 TABLET ORAL at 10:19

## 2022-11-17 RX ADMIN — SIMETHICONE 80 MG: 80 TABLET, CHEWABLE ORAL at 22:14

## 2022-11-17 RX ADMIN — LIDOCAINE PATCH 4% 2 PATCH: 40 PATCH TOPICAL at 18:53

## 2022-11-17 RX ADMIN — OXYCODONE HYDROCHLORIDE 5 MG: 5 TABLET ORAL at 14:58

## 2022-11-17 RX ADMIN — IBUPROFEN 800 MG: 800 TABLET, FILM COATED ORAL at 11:40

## 2022-11-17 RX ADMIN — SENNOSIDES AND DOCUSATE SODIUM 2 TABLET: 50; 8.6 TABLET ORAL at 07:50

## 2022-11-17 RX ADMIN — ACETAMINOPHEN 975 MG: 325 TABLET, FILM COATED ORAL at 01:10

## 2022-11-17 RX ADMIN — NIFEDIPINE 60 MG: 30 TABLET, FILM COATED, EXTENDED RELEASE ORAL at 07:49

## 2022-11-17 RX ADMIN — IBUPROFEN 800 MG: 800 TABLET, FILM COATED ORAL at 23:37

## 2022-11-17 RX ADMIN — SIMETHICONE 80 MG: 80 TABLET, CHEWABLE ORAL at 07:49

## 2022-11-17 RX ADMIN — OXYCODONE HYDROCHLORIDE 5 MG: 5 TABLET ORAL at 23:37

## 2022-11-17 RX ADMIN — OXYCODONE HYDROCHLORIDE 5 MG: 5 TABLET ORAL at 01:10

## 2022-11-17 RX ADMIN — SENNOSIDES AND DOCUSATE SODIUM 2 TABLET: 50; 8.6 TABLET ORAL at 21:58

## 2022-11-17 RX ADMIN — KETOROLAC TROMETHAMINE 30 MG: 30 INJECTION, SOLUTION INTRAMUSCULAR at 05:26

## 2022-11-17 RX ADMIN — SIMETHICONE 80 MG: 80 TABLET, CHEWABLE ORAL at 14:53

## 2022-11-17 RX ADMIN — ACETAMINOPHEN 975 MG: 325 TABLET, FILM COATED ORAL at 14:53

## 2022-11-17 RX ADMIN — ACETAMINOPHEN 975 MG: 325 TABLET, FILM COATED ORAL at 21:58

## 2022-11-17 RX ADMIN — OXYCODONE HYDROCHLORIDE 5 MG: 5 TABLET ORAL at 18:52

## 2022-11-17 RX ADMIN — OXYCODONE HYDROCHLORIDE 5 MG: 5 TABLET ORAL at 05:26

## 2022-11-17 RX ADMIN — IBUPROFEN 800 MG: 800 TABLET, FILM COATED ORAL at 17:16

## 2022-11-17 ASSESSMENT — ACTIVITIES OF DAILY LIVING (ADL)
ADLS_ACUITY_SCORE: 18
ADLS_ACUITY_SCORE: 18
ADLS_ACUITY_SCORE: 19
ADLS_ACUITY_SCORE: 18
ADLS_ACUITY_SCORE: 18
ADLS_ACUITY_SCORE: 19
ADLS_ACUITY_SCORE: 19
ADLS_ACUITY_SCORE: 18
ADLS_ACUITY_SCORE: 19
ADLS_ACUITY_SCORE: 22

## 2022-11-17 NOTE — PROVIDER NOTIFICATION
11/17/22 0624   Provider Notification   Provider Name/Title G2 Yefri   Method of Notification Electronic Page   Request Evaluate-Remote   Notification Reason Other   Hello,  Pt was unable to void. Can you place the order for castellanos.-Per  YUSRA Knight  Thanks.

## 2022-11-17 NOTE — PROVIDER NOTIFICATION
11/16/22 1800   Provider Notification   Provider Name/Title eduard MENG   Method of Notification Electronic Page;Phone   Request Evaluate-Remote   Notification Reason Status Update  (pt crying c/o stabbing pain by lower Right incision)

## 2022-11-17 NOTE — PLAN OF CARE
Vitals & PP assessments within normal range. Up ad michael.   Pt pumping & visiting her infant.   Incisional dressing C/D/I.   Pt has mild headache & planning to rest.   Passing flatus.   Ashford draining to gravity & has good output.   Will continue on supportive cares.      Plan of Care Reviewed With: patient, spouse    Overall Patient Progress: improvingOverall Patient Progress: improving         Pt showered this evening & took dressing off.

## 2022-11-17 NOTE — PROVIDER NOTIFICATION
11/17/22 0621   Provider Notification   Provider Name/Title G3-Eugene   Method of Notification Electronic Page   Request Evaluate-Remote   Notification Reason Other   Pt was unable to void. Can you place the order for castellanos.  Thanks.

## 2022-11-17 NOTE — PROVIDER NOTIFICATION
11/17/22 1000   Provider Notification   Provider Name/Title Dr Jones G2   Method of Notification Electronic Page   Request Evaluate-Remote   Notification Reason Medication Request;Status Update   Pt wants explanation from Doctors regarding Lovenox injection. Thanks

## 2022-11-17 NOTE — PLAN OF CARE
Goal Outcome Evaluation:      Plan of Care Reviewed With: patient    Overall Patient Progress: improvingOverall Patient Progress: improving         Pts BP was running higher (see flow sheet) at the beginning of the shift. BP is now lower after MD increased pts procardia to 60mg tonight. Incision is covered with dressing, dressing has a small amount of shadow drainage which I marked. No new drainage noted the rest of the shift. Taking IBU, Tylenol and oxy for pain control. Infant is in the CVICU and pt has gone down twice this evening to visit him. Pt returned from first visit crying in pain. Pt states she has a stabbing pain on the lower right side of her abdomen. G3 was notified, she requested a new set of VS to be done ( see flow sheet) and ordered vistaril and lidocaine patches. Patches applied at 1900, medicated per orders. Pt was not able to void after removal of castellanos and was st. Cath for 1200 mls at 2100. Pt is pumping and getting small amounts of colostrum. Pt states her pain feels a lot better now that her bladder is empty. Continue to monitor for first void on her own and medicated as needed for pain.

## 2022-11-17 NOTE — PROVIDER NOTIFICATION
11/17/22 0416   Provider Notification   Provider Name/Title G2-Eugene   Method of Notification Electronic Page   Request Evaluate-Remote   Notification Reason Status Update   Hello,     Just an FYI- pt could not void at 0140, bladder scanned (319mls) and straight cathed for 1100mls. Will attempt another void at 0540. Pt encouraged to drink more fluids.

## 2022-11-17 NOTE — PLAN OF CARE
Goal Outcome Evaluation:       Data: VSS, postpartum assessments WNL. She is voiding without difficulty, up ad michael, eating and drinking normally. Incision appears to be healing well, dressing still on. Lochia WNL, no s/s infection. Pt pumped during night. Taking toradol and tylenol for pain. Pt unable to void, straight cathed once 1100mls, provider notified. Ahsford placed at 0640 per orders. Reports good pain management.   Action: Education provided on urinary retention protocol and plan of care.  Response: Pt is agreeable with her plan of care. Positive attachment behaviors observed with infant. Spouse present and present. Continue with plan of care.

## 2022-11-17 NOTE — PROGRESS NOTES
"[facility] Postpartum Progress Note    S: Feeling better this morning. Had difficulty with urinary retention and last had a straight cath for 1100 ml this am, at which time a castellanos catheter was placed. Had right sided abdominal pain at that time, but that has resolved with drainage of her bladder. Pumping and milk starting to come in. Minimal lochia - only wearing a small pad. Tolerating diet. Planning to go to CVICU now to visit baby.    O:  /88 (BP Location: Right arm, Patient Position: Sitting, Cuff Size: Adult Regular)   Pulse 96   Temp 97.7  F (36.5  C) (Oral)   Resp 16   Ht 1.749 m (5' 8.86\")   Wt 114 kg (251 lb 5.2 oz)   LMP 2022   SpO2 99%   Breastfeeding Unknown   BMI 37.27 kg/m    Gen: NAD  Abd: abdominal binder on  Ext: 2+ pitting edema    Hgb: 11.5    Assessment: 27 year old  POD #1 from primary  section for fetal indications (HLHS with intact atrial septum) and gestational hypertension.     POD #1 Primary  section for fetal indications  -Routine postoperative cares  -Remove bandage later today    Urinary retention with castellanos replaced this am  -Remove castellanos after 12-24 hours    # gHTN  - Serial BP monitoring with blood pressure goal <140/90  - Continue Procardia XL 60 mg daily  - IV Antihypertensives prn for sustained severe range blood pressures (>160/>110)  - Daily weights, strict I&Os     # PNC:  - Prenatal labs:   Rh: +  antibody: neg               HepB/HIV/RPR/HepC: nonreactive               Rubella: immune    - Immunizations: s/p TDaP, no flu  - Pap last 2016 NILM- due for repeat pp  - Feeding: breast  - Contraception: need to discuss    Anticipate discharge home on POD #3. Follow up visit scheduled in [facility] clinic at 11 am on 2022.    Joleen Alberto MD  Specialist in Maternal-Fetal Medicine    "

## 2022-11-17 NOTE — LACTATION NOTE
Consult for: Pumping for baby in CVICU     Delivery Information: Baby Jv was born at 37.0 weeks via c/s yesterday at 0834.     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   She has a history of gestational hypertension.?    Maternal Breast Exam: Jennifer noted breast growth and sensitivity in early pregnancy. She has been pumping with the Symphony Initiate setting. She is using the 24mm flange with 19mm inserts and is able to pump comfortably.?    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 Thomens share that he had surgery after delivery yesterday and he is doing well. ?      Education: Inpatient lactation support, pumping recommendations, pump flange fitting tips, Pumping log, [employer] Breastfeeding Videos and benefits of hands on pumping/hand expression.       Plan: Pump 8-12 times per day using hands on pumping. Jennifer was encouraged to use the Initiate setting on the Symphony pump and then transition to the Maintain setting when pumping 20ml or more x 2 sessions.     Lactation will follow while Jv is in the CVICU.

## 2022-11-18 PROCEDURE — 250N000011 HC RX IP 250 OP 636: Performed by: STUDENT IN AN ORGANIZED HEALTH CARE EDUCATION/TRAINING PROGRAM

## 2022-11-18 PROCEDURE — 120N000002 HC R&B MED SURG/OB UMMC

## 2022-11-18 PROCEDURE — 250N000013 HC RX MED GY IP 250 OP 250 PS 637

## 2022-11-18 PROCEDURE — 250N000013 HC RX MED GY IP 250 OP 250 PS 637: Performed by: STUDENT IN AN ORGANIZED HEALTH CARE EDUCATION/TRAINING PROGRAM

## 2022-11-18 RX ORDER — NIFEDIPINE 30 MG/1
90 TABLET, EXTENDED RELEASE ORAL DAILY
Status: DISCONTINUED | OUTPATIENT
Start: 2022-11-19 | End: 2022-11-19

## 2022-11-18 RX ORDER — IBUPROFEN 600 MG/1
600 TABLET, FILM COATED ORAL EVERY 6 HOURS PRN
Qty: 60 TABLET | Refills: 0 | Status: SHIPPED | OUTPATIENT
Start: 2022-11-18 | End: 2022-11-20

## 2022-11-18 RX ORDER — METOCLOPRAMIDE 10 MG/1
10 TABLET ORAL ONCE
Status: COMPLETED | OUTPATIENT
Start: 2022-11-18 | End: 2022-11-18

## 2022-11-18 RX ORDER — AMOXICILLIN 250 MG
1 CAPSULE ORAL DAILY
Qty: 100 TABLET | Refills: 0 | Status: SHIPPED | OUTPATIENT
Start: 2022-11-18 | End: 2023-01-24

## 2022-11-18 RX ORDER — ACETAMINOPHEN 325 MG/1
650 TABLET ORAL EVERY 6 HOURS PRN
Qty: 100 TABLET | Refills: 0 | Status: SHIPPED | OUTPATIENT
Start: 2022-11-18 | End: 2022-11-20

## 2022-11-18 RX ORDER — DIPHENHYDRAMINE HCL 25 MG
50 CAPSULE ORAL ONCE
Status: COMPLETED | OUTPATIENT
Start: 2022-11-18 | End: 2022-11-18

## 2022-11-18 RX ADMIN — ACETAMINOPHEN 975 MG: 325 TABLET, FILM COATED ORAL at 02:57

## 2022-11-18 RX ADMIN — IBUPROFEN 800 MG: 800 TABLET, FILM COATED ORAL at 12:06

## 2022-11-18 RX ADMIN — OXYCODONE HYDROCHLORIDE 5 MG: 5 TABLET ORAL at 22:44

## 2022-11-18 RX ADMIN — METOCLOPRAMIDE 10 MG: 10 TABLET ORAL at 15:10

## 2022-11-18 RX ADMIN — IBUPROFEN 800 MG: 800 TABLET, FILM COATED ORAL at 05:09

## 2022-11-18 RX ADMIN — SENNOSIDES AND DOCUSATE SODIUM 1 TABLET: 50; 8.6 TABLET ORAL at 22:26

## 2022-11-18 RX ADMIN — ENOXAPARIN SODIUM 40 MG: 40 INJECTION SUBCUTANEOUS at 08:05

## 2022-11-18 RX ADMIN — DIPHENHYDRAMINE HYDROCHLORIDE 50 MG: 25 CAPSULE ORAL at 15:10

## 2022-11-18 RX ADMIN — IBUPROFEN 800 MG: 800 TABLET, FILM COATED ORAL at 18:33

## 2022-11-18 RX ADMIN — SENNOSIDES AND DOCUSATE SODIUM 2 TABLET: 50; 8.6 TABLET ORAL at 08:05

## 2022-11-18 RX ADMIN — ACETAMINOPHEN 975 MG: 325 TABLET, FILM COATED ORAL at 08:05

## 2022-11-18 RX ADMIN — NIFEDIPINE 60 MG: 30 TABLET, FILM COATED, EXTENDED RELEASE ORAL at 08:05

## 2022-11-18 RX ADMIN — LIDOCAINE PATCH 4% 2 PATCH: 40 PATCH TOPICAL at 22:24

## 2022-11-18 RX ADMIN — SIMETHICONE 80 MG: 80 TABLET, CHEWABLE ORAL at 12:09

## 2022-11-18 RX ADMIN — ACETAMINOPHEN 975 MG: 325 TABLET, FILM COATED ORAL at 14:46

## 2022-11-18 RX ADMIN — SIMETHICONE 80 MG: 80 TABLET, CHEWABLE ORAL at 18:39

## 2022-11-18 RX ADMIN — ACETAMINOPHEN 975 MG: 325 TABLET, FILM COATED ORAL at 22:25

## 2022-11-18 ASSESSMENT — ACTIVITIES OF DAILY LIVING (ADL)
ADLS_ACUITY_SCORE: 18

## 2022-11-18 NOTE — PLAN OF CARE
Goal Outcome Evaluation:    [id number]      The patient had a headache that started 4 hours ago, and her vitals were normal at the time. Around 1450, the patient reported an increase in headache, vitals obtain and were mildly high. MD has been notified. Rechecked blood pressure of 144/94, Reglan and Benadryl given. The patient is currently resting. Will continue with current plan of care, intervene as needed, and notify provider if condition changes.

## 2022-11-18 NOTE — PROVIDER NOTIFICATION
11/18/22 1456   Provider Notification   Provider Name/Title G 2 resident   Method of Notification Electronic Page   Request Evaluate-Remote   Notification Reason Vital Signs Change   C/o headache with a Current bp of 145/94, tylenol given. Will recheck in 15 minutes..       At about 1510, recheck BP of 144/94  was paged to provider.

## 2022-11-18 NOTE — LACTATION NOTE
Follow Up Consult    Jennifer shares she has been pumping at least 20ml total throughout the day today.    She had questions related to her oxycodone use as her RN had expressed concern about higher dose yesterday. I reviewed recommendations with her that daily doses higher than 30mg/day could lead to sedation in infants. She is routinely not taking higher dose of oxycodone and she has not used today.    Encouraged her to use oxycodone if she needs it for pain as pain control is important.    I spoke with Jv' nurse in the CVICU as father presented concern to her. Shared information with her about varying recommendations in the literature around breastfeeding and oxycodone use. Most resources recommend monitoring infants for sedation with doses over 30-45mg/day. If providers are concerned they could mix the milk from yesterday with milk pumped on another day now that Jennifer is back to taking oxycodone doses lower than 30mg /day.

## 2022-11-18 NOTE — PLAN OF CARE
Data: Vital signs within normal limits. Postpartum checks within normal limits - see flow record. Patient eating and drinking normally. Castellanos cath removed this morning, Patient able to empty bladder independently x 1 time after castellanos was removed.  Incision healing well, dry intact.    Action: Patient medicated during the shift for pain. See MAR. Patient reassessed within 1 hour after each medication and pain was improved.

## 2022-11-18 NOTE — CONSULTS
This writer acknowledges SW consult.  Baby is in CVCC and family is connected to the SW there.  This writer attempted to meet with Jennifer in her CC room to see if she has any post-partum concerns.  She was sleeping and asked to not be disturbed.  This writer spoke with her  in the hallway.  He stated he is not aware of any needs at this time and knows they can follow up with SW in [facility].    Maria Del Rosario Roche  DSW, MSW, LICSW  Maternal Child Health

## 2022-11-18 NOTE — ANESTHESIA POSTPROCEDURE EVALUATION
Patient: Jennifer El    Procedure: Procedure(s):   SECTION       Anesthesia Type:  Spinal    Note:  Disposition: Inpatient   Postop Pain Control: Uneventful            Sign Out: Well controlled pain   PONV: No   Neuro/Psych: Uneventful            Sign Out: Acceptable/Baseline neuro status   Airway/Respiratory: Uneventful            Sign Out: Acceptable/Baseline resp. status   CV/Hemodynamics: Uneventful            Sign Out: Acceptable CV status; No obvious hypovolemia; No obvious fluid overload   Other NRE: NONE   DID A NON-ROUTINE EVENT OCCUR? No           Last vitals:  Vitals Value Taken Time   /94 22 1100   Temp     Pulse 56 22 1058   Resp 18 22 1100   SpO2 99 % 22 1100   Vitals shown include unvalidated device data.    Electronically Signed By: Ephraim Bahena MD  2022  4:14 PM

## 2022-11-18 NOTE — PROGRESS NOTES
"Post Partum Progress Note  PPD#2    Subjective:  She states that she is resting comfortably in bed this morning. She has no complaints. Pain is improving and well controlled on current medication regimen. She is tolerating PO intake. Lochia present and minimal.  She is voiding without difficulty after having her castellanos removed this morning. She has passed flatus and has not had a BM. She is ambulating without dizziness or difficulty.  She denies headache, changes in vision, nausea/vomiting, chest pain, shortness of breath, RUQ pain, or worsening edema.  Plans to pump feed.    Objective:  Vital signs:  Temp: 98.2  F (36.8  C) Temp src: Oral BP: 126/80 Pulse: 70   Resp: 20   O2 Device: None (Room air)   Height: 174.9 cm (5' 8.86\") Weight: 114 kg (251 lb 5.2 oz)  Estimated body mass index is 37.27 kg/m  as calculated from the following:    Height as of this encounter: 1.749 m (5' 8.86\").    Weight as of this encounter: 114 kg (251 lb 5.2 oz).    General: NAD, resting comfortably  CV: Regular rate and rhythm, well perfused.   Pulm: Normal respiratory effort, CTABL  Abd: Soft, non-tender, non-distended. Fundus is firm and 2 cm below the umbilicus.    Incision: incision is clean, dry, intact  Ext: Trace lower extremity edema bilaterally. No calf tenderness.    Assessment/Plan:  Jennifer El is a 27 year old  female who is POD#2 s/p PLTCS. Doing well postpartum    # gHTN  - Serial BP monitoring with blood pressure goal <140/90  - Continue Procardia XL 60 mg daily  - IV Antihypertensives prn for sustained severe range blood pressures (>160/>110)  - Daily weights, strict I&Os     # Postpartum:  - Encourage routine post-operative goals including ambulation and incentive spirometry  - PNC: Rh positive. Rubella immune. No intervention indicated.  - Pain: controlled on oral medications  - Heme: Hgb 12.9>> 11.5.  If <10 will discharge home with iron. No symptoms of acute blood loss anemia at this time  - GI: " continue anti-emetics and stool softeners as needed.  - : Ash[place] in place due to postoperative urinary retention. Removed and voiding this morning. Will continue to monitor.  - Infant: Stable   - Feeding: Plans on pump feeding.  - BC: Declines    Anticipate discharge to home on POD#2-3     Ray Dacosta MD  OBGYN PGY-3  November 18, 2022 8:07 AM    I have seen and examined the patient with the resident. I have reviewed, edited, and agree with the note.     Blanche Hernandez MD

## 2022-11-19 PROCEDURE — 250N000011 HC RX IP 250 OP 636: Performed by: STUDENT IN AN ORGANIZED HEALTH CARE EDUCATION/TRAINING PROGRAM

## 2022-11-19 PROCEDURE — 250N000013 HC RX MED GY IP 250 OP 250 PS 637: Performed by: OBSTETRICS & GYNECOLOGY

## 2022-11-19 PROCEDURE — 120N000002 HC R&B MED SURG/OB UMMC

## 2022-11-19 PROCEDURE — 250N000013 HC RX MED GY IP 250 OP 250 PS 637: Performed by: STUDENT IN AN ORGANIZED HEALTH CARE EDUCATION/TRAINING PROGRAM

## 2022-11-19 RX ORDER — NIFEDIPINE 30 MG/1
30 TABLET, EXTENDED RELEASE ORAL DAILY
Status: COMPLETED | OUTPATIENT
Start: 2022-11-19 | End: 2022-11-19

## 2022-11-19 RX ORDER — NIFEDIPINE 30 MG/1
30 TABLET, EXTENDED RELEASE ORAL DAILY
Status: DISCONTINUED | OUTPATIENT
Start: 2022-11-19 | End: 2022-11-19

## 2022-11-19 RX ORDER — BENZOCAINE/MENTHOL 6 MG-10 MG
LOZENGE MUCOUS MEMBRANE 2 TIMES DAILY
Status: DISCONTINUED | OUTPATIENT
Start: 2022-11-19 | End: 2022-11-20 | Stop reason: HOSPADM

## 2022-11-19 RX ORDER — NIFEDIPINE 90 MG/1
90 TABLET, FILM COATED, EXTENDED RELEASE ORAL DAILY
Qty: 30 TABLET | Refills: 1 | Status: SHIPPED | OUTPATIENT
Start: 2022-11-20 | End: 2022-11-20

## 2022-11-19 RX ORDER — NIFEDIPINE 30 MG/1
120 TABLET, EXTENDED RELEASE ORAL DAILY
Status: DISCONTINUED | OUTPATIENT
Start: 2022-11-20 | End: 2022-11-20 | Stop reason: HOSPADM

## 2022-11-19 RX ADMIN — ACETAMINOPHEN 975 MG: 325 TABLET, FILM COATED ORAL at 16:09

## 2022-11-19 RX ADMIN — IBUPROFEN 800 MG: 800 TABLET, FILM COATED ORAL at 20:19

## 2022-11-19 RX ADMIN — NIFEDIPINE 30 MG: 30 TABLET, FILM COATED, EXTENDED RELEASE ORAL at 18:17

## 2022-11-19 RX ADMIN — SIMETHICONE 80 MG: 80 TABLET, CHEWABLE ORAL at 16:09

## 2022-11-19 RX ADMIN — IBUPROFEN 800 MG: 800 TABLET, FILM COATED ORAL at 00:45

## 2022-11-19 RX ADMIN — IBUPROFEN 800 MG: 800 TABLET, FILM COATED ORAL at 06:56

## 2022-11-19 RX ADMIN — NIFEDIPINE 90 MG: 30 TABLET, FILM COATED, EXTENDED RELEASE ORAL at 07:46

## 2022-11-19 RX ADMIN — SENNOSIDES AND DOCUSATE SODIUM 1 TABLET: 50; 8.6 TABLET ORAL at 07:45

## 2022-11-19 RX ADMIN — SENNOSIDES AND DOCUSATE SODIUM 2 TABLET: 50; 8.6 TABLET ORAL at 20:19

## 2022-11-19 RX ADMIN — IBUPROFEN 800 MG: 800 TABLET, FILM COATED ORAL at 13:41

## 2022-11-19 RX ADMIN — ENOXAPARIN SODIUM 40 MG: 40 INJECTION SUBCUTANEOUS at 07:46

## 2022-11-19 RX ADMIN — ACETAMINOPHEN 975 MG: 325 TABLET, FILM COATED ORAL at 10:36

## 2022-11-19 RX ADMIN — ACETAMINOPHEN 975 MG: 325 TABLET, FILM COATED ORAL at 22:18

## 2022-11-19 RX ADMIN — ACETAMINOPHEN 975 MG: 325 TABLET, FILM COATED ORAL at 04:08

## 2022-11-19 RX ADMIN — HYDROCORTISONE: 1 CREAM TOPICAL at 22:17

## 2022-11-19 ASSESSMENT — ACTIVITIES OF DAILY LIVING (ADL)
ADLS_ACUITY_SCORE: 18

## 2022-11-19 NOTE — PROGRESS NOTES
[facility]   Postpartum Note    Name:  Jennifer El  MRN: [id number]    S: Patient is doing well overall. Had a mild headache overnight which has since resolved. Denies vision changes, chest pain, SOB, worsening edema, RUQ/epigastric pain.  Incisional pain well controlled. Voiding spontaneously. Tolerating regular diet without nausea or vomiting.  Ambulating without dizziness.  Lochia appropriate.  Pumping for baby in NICU.  Plans condoms for BC.    O:   Patient Vitals for the past 24 hrs:   BP Temp Temp src Pulse Resp SpO2 Weight   22 0721 (!) 133/96 98  F (36.7  C) Oral 69 18 -- 112.2 kg (247 lb 7 oz)   22 0408 (!) 140/99 98.2  F (36.8  C) Oral 75 16 -- --   22 2229 (!) 142/96 97.7  F (36.5  C) Oral 70 16 98 % --   22 1615 132/82 98.5  F (36.9  C) Oral 86 16 -- --   22 1511 (!) 144/94 -- -- -- -- -- --   22 1446 (!) 145/94 -- -- -- -- -- --   22 1206 131/89 -- -- -- -- -- --     Gen:  Resting comfortably, NAD  CV:  RR, well perfused  Pulm:  Regular work of breathing  Abd:  Soft, appropriately ttp, non-distended. Fundus below umbilicus, firm and non-tender. Incision c/d/I without surrounding erythema  Ext:  non-tender, trace LE edema b/l    I/O last 3 completed shifts:  In: 1560 [P.O.:1560]  Out: 1300 [Urine:1300]    Hgb:   Hemoglobin   Date Value Ref Range Status   2022 11.5 (L) 11.7 - 15.7 g/dL Final       Assessment/Plan:  Jennifer El 27 year old  on POD #3 s/p PLTCS. Pregnancy notable for gHTN. Nifedipine increased today to 90 mg QD for persistently mild range blood pressures. No signs or symptoms of preeclampsia. Otherwise doing well, meeting postoperative milestones.    # Postpartum management  - Rh: positive, Rubella: immune  - Pain: Well-controlled with ibuprofen, tylenol, and oxycodone PRN  - Hgb: 12.9>> 11.5  - GI: PRN bowel regimen and antiemetics, simethicone PRN.  - : Voiding spontanously  - PPX:  Encourage ambulation  - Feed: Pump  - BC: Condoms    # gHTN  - Serial BP monitoring with blood pressure goal <140/90  - Procardia XL 60 mg > D#1 90 mg for persistent mild range BPs  - No signs or symptoms of preeclampsia    Dispo: Anticipate discharge POD#3-4 pending BP control.    Rajwinder Thompson MD  OB/GYN PGY-1

## 2022-11-19 NOTE — PLAN OF CARE
Goal Outcome Evaluation:       VSS. Fundus midline, firm, and U/1. Scant lochia. Incision approximated with adhesive stirps; no drainage noted. Pain adequately managed with current pain meds. Headache improved and now ambulating ok, tolerating regular diet, and voiding without difficulty. Pumping independently. Visiting  in NICU. Continue with plan of care.

## 2022-11-19 NOTE — PROVIDER NOTIFICATION
11/19/22 0400   Provider Notification   Provider Name/Title G3 - Ray Dacosta   Method of Notification Electronic Page   Request Evaluate-Remote   Notification Reason Vital Signs Change     Message to provider: CASSIDY pt BP is 140/99.

## 2022-11-19 NOTE — PROVIDER NOTIFICATION
11/18/22 2300   Provider Notification   Provider Name/Title Dr. Genevieve Cerna   Method of Notification Electronic Page   Request Evaluate-Remote   Notification Reason Vital Signs Change     To provider: Pt BP was 142/96. Had headaches earlier in the day but has since subsided. What would you like to do?    Response: ZORA Cerna increased procardia dose for AM.

## 2022-11-19 NOTE — PLAN OF CARE
Goal Outcome Evaluation:  VS fairly stable - BP has been in the 140s/90s. Provider notified and plan is to increase procardia dose in AM. PP assessment is WDL. Pain is well managed with tylenol and ibuprofen - pt asked for oxycodone once overnight to sleep better. Pt is up and voiding independently- has loose stools so pt opted in for taking 1 stool softner instead of 2. Pt is breast pumping every 3-4hrs. Pt visited the CVICU last night and has plans to visit today.  is at bedside and is supportive.     Continue with plan of care.

## 2022-11-20 VITALS
OXYGEN SATURATION: 98 % | HEIGHT: 69 IN | RESPIRATION RATE: 18 BRPM | SYSTOLIC BLOOD PRESSURE: 125 MMHG | TEMPERATURE: 97.6 F | BODY MASS INDEX: 36.21 KG/M2 | HEART RATE: 58 BPM | WEIGHT: 244.5 LBS | DIASTOLIC BLOOD PRESSURE: 89 MMHG

## 2022-11-20 PROCEDURE — 250N000013 HC RX MED GY IP 250 OP 250 PS 637: Performed by: STUDENT IN AN ORGANIZED HEALTH CARE EDUCATION/TRAINING PROGRAM

## 2022-11-20 PROCEDURE — 250N000011 HC RX IP 250 OP 636: Performed by: STUDENT IN AN ORGANIZED HEALTH CARE EDUCATION/TRAINING PROGRAM

## 2022-11-20 RX ORDER — ACETAMINOPHEN AND CODEINE PHOSPHATE 120; 12 MG/5ML; MG/5ML
0.35 SOLUTION ORAL DAILY
Qty: 96 TABLET | Refills: 4 | Status: SHIPPED | OUTPATIENT
Start: 2022-11-20 | End: 2023-06-05

## 2022-11-20 RX ORDER — SIMETHICONE 80 MG
80 TABLET,CHEWABLE ORAL 4 TIMES DAILY PRN
Qty: 30 TABLET | Refills: 0 | Status: SHIPPED | OUTPATIENT
Start: 2022-11-20 | End: 2023-01-24

## 2022-11-20 RX ORDER — ACETAMINOPHEN 325 MG/1
650-975 TABLET ORAL EVERY 6 HOURS PRN
Qty: 100 TABLET | Refills: 0 | COMMUNITY
Start: 2022-11-20 | End: 2023-06-05

## 2022-11-20 RX ORDER — IBUPROFEN 200 MG
600 TABLET ORAL EVERY 6 HOURS PRN
COMMUNITY
Start: 2022-11-20 | End: 2023-06-05

## 2022-11-20 RX ORDER — OXYCODONE HYDROCHLORIDE 5 MG/1
5 TABLET ORAL EVERY 6 HOURS PRN
Qty: 8 TABLET | Refills: 0 | Status: SHIPPED | OUTPATIENT
Start: 2022-11-20 | End: 2023-01-24

## 2022-11-20 RX ADMIN — IBUPROFEN 800 MG: 800 TABLET, FILM COATED ORAL at 07:58

## 2022-11-20 RX ADMIN — ACETAMINOPHEN 975 MG: 325 TABLET, FILM COATED ORAL at 04:50

## 2022-11-20 RX ADMIN — IBUPROFEN 800 MG: 800 TABLET, FILM COATED ORAL at 03:01

## 2022-11-20 RX ADMIN — LIDOCAINE PATCH 4% 2 PATCH: 40 PATCH TOPICAL at 04:50

## 2022-11-20 RX ADMIN — SENNOSIDES AND DOCUSATE SODIUM 2 TABLET: 50; 8.6 TABLET ORAL at 10:24

## 2022-11-20 RX ADMIN — ACETAMINOPHEN 975 MG: 325 TABLET, FILM COATED ORAL at 10:23

## 2022-11-20 RX ADMIN — SIMETHICONE 80 MG: 80 TABLET, CHEWABLE ORAL at 07:58

## 2022-11-20 RX ADMIN — ENOXAPARIN SODIUM 40 MG: 40 INJECTION SUBCUTANEOUS at 10:24

## 2022-11-20 RX ADMIN — OXYCODONE HYDROCHLORIDE 5 MG: 5 TABLET ORAL at 01:18

## 2022-11-20 RX ADMIN — NIFEDIPINE 120 MG: 30 TABLET, FILM COATED, EXTENDED RELEASE ORAL at 07:58

## 2022-11-20 ASSESSMENT — ACTIVITIES OF DAILY LIVING (ADL)
ADLS_ACUITY_SCORE: 18

## 2022-11-20 NOTE — PLAN OF CARE
Goal Outcome Evaluation:       . Data: Vital signs stable, assessments within normal limits. Discharge instruction given and instructed of signs/symptoms to look for and report per discharge instructions.   Discharge outcomes on care plan met. No apparent pain.   Action: Review of care plan, teaching, and discharge instructions done with patient. verification with signature obtained.   Response: patient  states understanding and comfort with self cares. All questions about self care addressed. patient discharged   at 1130.

## 2022-11-20 NOTE — PLAN OF CARE
VSS on RA. On Procardia 120 mg daily. Up ad michael. Fundus firm at umbilicus. Lochia scant. Voids spontaneously. Passing flatus, denies nausea. Incisional pain managed with Oxycodone 5 mg 1x, Tylenol and Ibuprofen. Incision is TATI, with steri strips in place, c/d/i. Encouraged breast pumping, infant is in CV ICU. Continue plan of care.     Vitals:    11/19/22 2019 11/20/22 0018 11/20/22 0447 11/20/22 0505   BP: 125/81 117/71 115/73    BP Location:  Left arm Left arm    Patient Position:  Supine Semi-Esteban's    Cuff Size:  Adult Regular Adult Regular    Pulse:  80 58    Resp:   16    Temp:  97.8  F (36.6  C) 97.7  F (36.5  C)    TempSrc:  Oral Oral    SpO2:       Weight:    110.9 kg (244 lb 8 oz)   Height:

## 2022-11-20 NOTE — DISCHARGE INSTRUCTIONS
Call your doctor right away if you have any of the following: Preeclampsia   Swelling in your face or hands  Rapid weight gain--about 1 pound or more in a day  Headaches  Pain on the right side of abdomen (belly)  Problems with your eyesight (you see flashes or spots)  You have questions or concerns once you return home.  Postop  Birth Instructions    Activity     Do not lift more than 10 pounds for 6 weeks after surgery.  Ask family and friends for help when you need it.  No driving until you have stopped taking your pain medications (usually two weeks after surgery).  No heavy exercise or activity for 6 weeks.  Don't do anything that will put a strain on your surgery site.  Don't strain when using the toilet.  Your care team may prescribe a stool softener if you have problems with your bowel movements.     To care for your incision:     Keep the incision clean and dry.  Do not soak your incision in water. No swimming or hot tubs until it has fully healed. You may soak in the bathtub if the water level is below your incision.  Do not use peroxide, gel, cream, lotion, or ointment on your incision.  Adjust your clothes to avoid pressure on your surgery site (check the elastic in your underwear for example).     You may see a small amount of clear or pink drainage and this is normal.  Check with your health care provider:     If the drainage increases or has an odor.  If the incision reddens, you have swelling, or develop a rash.  If you have increased pain and the medicine we prescribed doesn't help.  If you have a fever above 100.4 F (38 C) with or without chills when placing thermometer under your tongue.   The area around your incision (surgery wound), will feel numb.  This is normal. The numbness should go away in less than a year.     Keep your hands clean:  Always wash your hands before touching your incision (surgery wound). This helps reduce your risk of infection. If your hands aren't dirty, you may  use an alcohol hand-rub to clean your hands. Keep your nails clean and short.    Call your healthcare provider if you have any of these symptoms:     You soak a sanitary pad with blood within 1 hour, or you see blood clots larger than a golf ball.  Bleeding that lasts more than 6 weeks.  Vaginal discharge that smells bad.  Severe pain, cramping or tenderness in your lower belly area.  A need to urinate more frequently (use the toilet more often), more urgently (use the toilet very quickly), or it burns when you urinate.  Nausea and vomiting.  Redness, swelling or pain around a vein in your leg.  Problems breastfeeding or a red or painful area on your breast.  Chest pain and cough or are gasping for air.  Problems with coping with sadness, anxiety or depression. If you have concerns about hurting yourself or the baby, call your provider immediately.    You have questions or concerns after you return home.

## 2022-11-20 NOTE — PROGRESS NOTES
[facility]   Postpartum Note    Name:  Jennifer El  MRN: [id number]    S: Patient is doing well overall. Denies headaches, vision changes, chest pain, SOB, worsening edema, RUQ/epigastric pain.  Incisional pain well controlled. Voiding spontaneously. Tolerating regular diet without nausea or vomiting.  Ambulating without dizziness.  Lochia appropriate.  Pumping for baby in NICU.  Plans condoms for BC.    O:   Patient Vitals for the past 24 hrs:   BP Temp Temp src Pulse Resp Weight   22 0715 125/89 97.6  F (36.4  C) Oral -- 18 --   22 0505 -- -- -- -- -- 110.9 kg (244 lb 8 oz)   22 0447 115/73 97.7  F (36.5  C) Oral 58 16 --   22 0018 117/71 97.8  F (36.6  C) Oral 80 -- --   22 125/81 -- -- -- -- --   22 1608 (!) 144/93 98.7  F (37.1  C) Oral -- 18 --     Gen:  Resting comfortably, NAD  CV:  RR, well perfused  Pulm:  Regular work of breathing  Abd:  Soft, appropriately ttp, non-distended. Fundus below umbilicus, firm and non-tender. Incision c/d/I without surrounding erythema  Ext:  Non-tender, trace LE edema b/l    No intake/output data recorded.    Hgb:   Hemoglobin   Date Value Ref Range Status   2022 11.5 (L) 11.7 - 15.7 g/dL Final       Assessment/Plan:  Jennifer El 27 year old  on POD #4 s/p PLTCS. Pregnancy notable for gHTN. Now on nifedipine 120mg QD with improvement of BP to normal range. No signs or symptoms of preeclampsia.     # Postpartum management  - Rh: positive, Rubella: immune  - Pain: Well-controlled with ibuprofen, tylenol, and oxycodone PRN  - Hgb: 12.9>> 11.5  - GI: PRN bowel regimen and antiemetics, simethicone PRN.  - : Voiding spontanously  - PPX: Encourage ambulation  - Feed: Pump  - BC: Condoms    # gHTN  - Serial BP monitoring with blood pressure goal <140/90  - D#2 nifedipine 120 mg with normal range BP overnight  - No signs or symptoms of preeclampsia    Dispo: Anticipate discharge  today.    Rajwinder Thompson MD  OB/GYN PGY-1    Appreciate Dr. Thompson's note above, patient also seen and examined by me. Patient requests progesterone only OCP as well on discharge.  I agree with the note above.   Gaby Fitzpatrick MD

## 2022-11-20 NOTE — PLAN OF CARE
Goal Outcome Evaluation:       [id number]  Patient spent of the day with infant. Had mild elevated controled with scheduled procardia with current of 125/81. Denies any SOB or vision changes. Up ad michael in room. Pain well managed with current pain regimen. Will continue with current plan of care.

## 2022-11-22 ENCOUNTER — ALLIED HEALTH/NURSE VISIT (OUTPATIENT)
Dept: MATERNAL FETAL MEDICINE | Facility: CLINIC | Age: 27
End: 2022-11-22
Attending: OBSTETRICS & GYNECOLOGY
Payer: COMMERCIAL

## 2022-11-22 ENCOUNTER — PATIENT OUTREACH (OUTPATIENT)
Dept: CARE COORDINATION | Facility: CLINIC | Age: 27
End: 2022-11-22

## 2022-11-22 VITALS — BODY MASS INDEX: 35.97 KG/M2 | DIASTOLIC BLOOD PRESSURE: 94 MMHG | WEIGHT: 242.6 LBS | SYSTOLIC BLOOD PRESSURE: 135 MMHG

## 2022-11-22 NOTE — PROGRESS NOTES
Clinic Care Coordination Contact  [facility]/Voicemail       Clinical Data: Care Coordinator Outreach  Outreach attempted x 2.  Left message on patient's voicemail with call back information and requested return call.  Plan: Care Coordinator will make no further outreaches at this time.    ANGIE Avila   Social Work Clinic Care Coordinator   [facility]  PH: [phone]  [email or web address]

## 2022-11-22 NOTE — NURSING NOTE
Jennifer seen in clinic today for 1 week PP BP check(see report/notes). Pt and SO are staying in the CV ICU with baby. VS done, see flowsheet. Pt denies headache/vision changes/chest pain/SOB. Jennifer has +2 edema in her BL lower extremities up to her knees, continues to wear compression stockings. Jennifer reports small amount of copper colored lochia flow, no clots. Dr. Alberto met with pt and discussed POC. Plan to change nifedipine dose to 60 mg BID and recheck BP, incision and mood assessment in 1 week. Future visits scheduled at . Pt discharged stable and ambulatory.    Lou Beck RN

## 2022-11-23 ENCOUNTER — MYC MEDICAL ADVICE (OUTPATIENT)
Dept: MATERNAL FETAL MEDICINE | Facility: CLINIC | Age: 27
End: 2022-11-23

## 2022-11-23 LAB
PATH REPORT.COMMENTS IMP SPEC: NORMAL
PATH REPORT.COMMENTS IMP SPEC: NORMAL
PATH REPORT.FINAL DX SPEC: NORMAL
PATH REPORT.GROSS SPEC: NORMAL
PATH REPORT.MICROSCOPIC SPEC OTHER STN: NORMAL
PATH REPORT.RELEVANT HX SPEC: NORMAL
PHOTO IMAGE: NORMAL

## 2022-12-12 ENCOUNTER — TELEPHONE (OUTPATIENT)
Dept: MATERNAL FETAL MEDICINE | Facility: CLINIC | Age: 27
End: 2022-12-12

## 2022-12-12 NOTE — TELEPHONE ENCOUNTER
LVM for Violet to call back PCC line. Need to schedule 2 week follow up OB to check BP and adjust medication as needed.  Lou Beck RN

## 2023-01-13 ENCOUNTER — MEDICAL CORRESPONDENCE (OUTPATIENT)
Dept: HEALTH INFORMATION MANAGEMENT | Facility: CLINIC | Age: 28
End: 2023-01-13

## 2023-01-15 NOTE — PROGRESS NOTES
October 3, 2022    Dear Colleagues:    I had the pleasure of meeting your pt Jennifer ALVIN El in the Maternal Fetal Medicine Clinic for an  consultation.  This was at the request of Dr. Rajwinder Gr of Maternal Fetal Medicine Service at the [facility], [place].  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 Her partner Marciano was also at the visit.    We reviewed the overall plans for care for their baby, Jv following birth.  There will be a  resuscitation team present at the delivery and to care for and admit Mary to the  Intensive Care Unit at the [facility].  At this point, he will be closely monitored from a cardiorespiratory standpoint with plans to get an echocardiogram soon after birth.  We discussed overall plans for care, which include the placement of central umbilical arterial and venous catheters, both for medication administration as well as possible procedures, and blood pressure monitoring as well as blood sampling.  If necessary, respiratory support will be provided to their baby as well, and I mentioned that this might include mechanical ventilation if indicated. The Pediatric Cardiology and Pediatric Cardiovascular Surgery teams will be in discussion regarding the optimal timing of Jv's surgical correction of the transposition of the great vessels.  He will be cared for in the NICU until that time is decided, and he will be preoperatively transferred to the PediatricCardiovascular Intensive Care Unit.      Mom has planned to breast feed.  We discussed feedings pre-op are unlikely, but that lactation support to bring her supply in will be provided. I discussed the overall makeup of the medical team and healthcare providers in the  Intensive  Care Unit.  I described the typical structure of rounds with and invited them to be present during rounds and assured them that there will always be someone available to answer any questions.  I discussed, too, other aspects of the NICU including welcoming policies during the current viral season. I provided them with my contact information should questions arise following this clinic visit.  One of our maternal child health social workers will be in touch with the family in the next couple days.    Thank you very much for the opportunity to meet with this delightful couple.  We look forward to caring for Jv and for all of them in the NICU at the [facility].      Total time spent was 25 minutes spent, 100% of the time spent in direct patient counseling.    Sincerely,  Margy Kim MD  Attending Neonatologist  [facility] NICU

## 2023-01-24 ENCOUNTER — OFFICE VISIT (OUTPATIENT)
Dept: FAMILY MEDICINE | Facility: CLINIC | Age: 28
End: 2023-01-24
Payer: COMMERCIAL

## 2023-01-24 VITALS
WEIGHT: 225 LBS | BODY MASS INDEX: 33.36 KG/M2 | OXYGEN SATURATION: 97 % | RESPIRATION RATE: 16 BRPM | HEART RATE: 98 BPM | TEMPERATURE: 98.6 F | DIASTOLIC BLOOD PRESSURE: 84 MMHG | SYSTOLIC BLOOD PRESSURE: 122 MMHG

## 2023-01-24 DIAGNOSIS — J02.9 SORE THROAT: Primary | ICD-10-CM

## 2023-01-24 LAB — DEPRECATED S PYO AG THROAT QL EIA: NEGATIVE

## 2023-01-24 PROCEDURE — 99213 OFFICE O/P EST LOW 20 MIN: CPT | Performed by: PHYSICIAN ASSISTANT

## 2023-01-24 PROCEDURE — 87651 STREP A DNA AMP PROBE: CPT | Performed by: PHYSICIAN ASSISTANT

## 2023-01-24 NOTE — PROGRESS NOTES
URGENT CARE VISIT:    SUBJECTIVE:   Jennifer El is a 27 year old female presenting with a chief complaint of sore throat.  Onset was 3 day(s) ago.   She denies the following symptoms: fever, stuffy nose, cough - productive, vomiting and diarrhea  Course of illness is same.    Treatment measures tried include none tried with no relief of symptoms.  Predisposing factors include exposure to strep.    PMH:   Past Medical History:   Diagnosis Date     Migraines 2014     Allergies: Patient has no known allergies.   Medications:   Current Outpatient Medications   Medication Sig Dispense Refill     acetaminophen (TYLENOL) 325 MG tablet Take 2-3 tablets (650-975 mg) by mouth every 6 hours as needed for mild pain Start after Delivery. 100 tablet 0     ibuprofen (ADVIL/MOTRIN) 200 MG tablet Take 3 tablets (600 mg) by mouth every 6 hours as needed for moderate pain (4-6) Start after delivery       norethindrone (MICRONOR) 0.35 MG tablet Take 1 tablet (0.35 mg) by mouth daily (Patient not taking: Reported on 11/22/2022) 96 tablet 4     Social History:   Social History     Tobacco Use     Smoking status: Never     Passive exposure: Never     Smokeless tobacco: Never   Substance Use Topics     Alcohol use: Not Currently       ROS:  Review of systems negative except as stated above.    OBJECTIVE:  /84 (BP Location: Right arm, Patient Position: Sitting, Cuff Size: Adult Large)   Pulse 98   Temp 98.6  F (37  C) (Oral)   Resp 16   Wt 102.1 kg (225 lb)   LMP 03/02/2022   SpO2 97%   Breastfeeding Yes   BMI 33.36 kg/m    GENERAL APPEARANCE: healthy, alert and no distress  EYES: EOMI,  PERRL, conjunctiva clear  HENT: ear canals and TM's normal.  Mildly erythematous oropharynx  NECK: supple, nontender, no lymphadenopathy  RESP: lungs clear to auscultation - no rales, rhonchi or wheezes  CV: regular rates and rhythm, normal S1 S2, no murmur noted  SKIN: no suspicious lesions or rashes    Labs:    Results for orders placed  or performed in visit on 01/24/23   Streptococcus A Rapid Screen w/Reflex to PCR - Clinic Collect     Status: Normal    Specimen: Throat; Swab   Result Value Ref Range    Group A Strep antigen Negative Negative       ASSESSMENT:    ICD-10-CM    1. Sore throat  J02.9 Streptococcus A Rapid Screen w/Reflex to PCR - Clinic Collect     Group A Streptococcus PCR Throat Swab          PLAN:  Patient Instructions   Patient was educated on the natural course of viral throat infection. Conservative measures discussed including warm fluids, salt water gargles, Lozenges (Cepacol), and over-the-counter analgesics (Tylenol or Ibuprofen). See your primary care provider if symptoms worsen or do not improve in 5 days. Seek emergency care if you develop severe throat pain, or difficulty swallowing.     Patient verbalized understanding and is agreeable to plan. The patient was discharged ambulatory and in stable condition.    Abby Biggs PA-C ....................  1/24/2023   5:50 PM

## 2023-01-25 LAB — GROUP A STREP BY PCR: NOT DETECTED

## 2023-06-01 ASSESSMENT — ENCOUNTER SYMPTOMS
FEVER: 0
SHORTNESS OF BREATH: 0
EYE PAIN: 0
NAUSEA: 0
HEARTBURN: 0
CHILLS: 0
DYSURIA: 0
ABDOMINAL PAIN: 0
HEADACHES: 0
HEMATOCHEZIA: 0
SORE THROAT: 0
BREAST MASS: 0
WEAKNESS: 0
NERVOUS/ANXIOUS: 1
PARESTHESIAS: 0
ARTHRALGIAS: 0
PALPITATIONS: 0
FREQUENCY: 0
COUGH: 0
DIARRHEA: 0
MYALGIAS: 0
HEMATURIA: 0
DIZZINESS: 0
CONSTIPATION: 0
JOINT SWELLING: 0

## 2023-06-05 ENCOUNTER — OFFICE VISIT (OUTPATIENT)
Dept: FAMILY MEDICINE | Facility: CLINIC | Age: 28
End: 2023-06-05
Payer: COMMERCIAL

## 2023-06-05 VITALS
HEIGHT: 68 IN | BODY MASS INDEX: 33.18 KG/M2 | TEMPERATURE: 98.4 F | RESPIRATION RATE: 16 BRPM | HEART RATE: 76 BPM | WEIGHT: 218.9 LBS | DIASTOLIC BLOOD PRESSURE: 85 MMHG | OXYGEN SATURATION: 100 % | SYSTOLIC BLOOD PRESSURE: 125 MMHG

## 2023-06-05 DIAGNOSIS — Z13.0 SCREENING FOR IRON DEFICIENCY ANEMIA: ICD-10-CM

## 2023-06-05 DIAGNOSIS — Z13.21 ENCOUNTER FOR VITAMIN DEFICIENCY SCREENING: ICD-10-CM

## 2023-06-05 DIAGNOSIS — Z00.00 ROUTINE GENERAL MEDICAL EXAMINATION AT A HEALTH CARE FACILITY: Primary | ICD-10-CM

## 2023-06-05 DIAGNOSIS — Z02.82 ADOPTED: ICD-10-CM

## 2023-06-05 DIAGNOSIS — Z11.1 SCREENING EXAMINATION FOR PULMONARY TUBERCULOSIS: ICD-10-CM

## 2023-06-05 DIAGNOSIS — F41.1 GENERALIZED ANXIETY DISORDER: ICD-10-CM

## 2023-06-05 DIAGNOSIS — Z13.1 SCREENING FOR DIABETES MELLITUS: ICD-10-CM

## 2023-06-05 DIAGNOSIS — Z87.59 HISTORY OF GESTATIONAL HYPERTENSION: ICD-10-CM

## 2023-06-05 DIAGNOSIS — Z13.29 SCREENING FOR THYROID DISORDER: ICD-10-CM

## 2023-06-05 DIAGNOSIS — Z78.9 MEASLES, MUMPS, RUBELLA (MMR) VACCINATION STATUS UNKNOWN: ICD-10-CM

## 2023-06-05 PROBLEM — O35.BXX0 ANOMALY OF HEART OF FETUS AFFECTING PREGNANCY, ANTEPARTUM, SINGLE OR UNSPECIFIED FETUS: Status: RESOLVED | Noted: 2022-11-16 | Resolved: 2023-06-05

## 2023-06-05 LAB
ERYTHROCYTE [DISTWIDTH] IN BLOOD BY AUTOMATED COUNT: 12.9 % (ref 10–15)
HBA1C MFR BLD: 5.2 % (ref 0–5.6)
HCT VFR BLD AUTO: 42.5 % (ref 35–47)
HGB BLD-MCNC: 14.2 G/DL (ref 11.7–15.7)
MCH RBC QN AUTO: 28 PG (ref 26.5–33)
MCHC RBC AUTO-ENTMCNC: 33.4 G/DL (ref 31.5–36.5)
MCV RBC AUTO: 84 FL (ref 78–100)
PLATELET # BLD AUTO: 422 10E3/UL (ref 150–450)
RBC # BLD AUTO: 5.08 10E6/UL (ref 3.8–5.2)
WBC # BLD AUTO: 5.7 10E3/UL (ref 4–11)

## 2023-06-05 PROCEDURE — 86481 TB AG RESPONSE T-CELL SUSP: CPT

## 2023-06-05 PROCEDURE — 86762 RUBELLA ANTIBODY: CPT

## 2023-06-05 PROCEDURE — 83036 HEMOGLOBIN GLYCOSYLATED A1C: CPT

## 2023-06-05 PROCEDURE — 82728 ASSAY OF FERRITIN: CPT

## 2023-06-05 PROCEDURE — 86735 MUMPS ANTIBODY: CPT

## 2023-06-05 PROCEDURE — 83540 ASSAY OF IRON: CPT

## 2023-06-05 PROCEDURE — 36415 COLL VENOUS BLD VENIPUNCTURE: CPT

## 2023-06-05 PROCEDURE — 82306 VITAMIN D 25 HYDROXY: CPT

## 2023-06-05 PROCEDURE — 99395 PREV VISIT EST AGE 18-39: CPT | Mod: 25

## 2023-06-05 PROCEDURE — 83550 IRON BINDING TEST: CPT

## 2023-06-05 PROCEDURE — 85027 COMPLETE CBC AUTOMATED: CPT

## 2023-06-05 PROCEDURE — 86765 RUBEOLA ANTIBODY: CPT

## 2023-06-05 PROCEDURE — 0121A COVID-19 BIVALENT 12+ (PFIZER): CPT

## 2023-06-05 PROCEDURE — 91312 COVID-19 BIVALENT 12+ (PFIZER): CPT

## 2023-06-05 PROCEDURE — 84443 ASSAY THYROID STIM HORMONE: CPT

## 2023-06-05 ASSESSMENT — ENCOUNTER SYMPTOMS
CHILLS: 0
CONSTIPATION: 0
NAUSEA: 0
DIARRHEA: 0
ABDOMINAL PAIN: 0
BREAST MASS: 0
COUGH: 0
HEARTBURN: 0
MYALGIAS: 0
WEAKNESS: 0
FREQUENCY: 0
DIZZINESS: 0
HEMATURIA: 0
FEVER: 0
JOINT SWELLING: 0
ARTHRALGIAS: 0
SHORTNESS OF BREATH: 0
HEMATOCHEZIA: 0
SORE THROAT: 0
PARESTHESIAS: 0
NERVOUS/ANXIOUS: 1
DYSURIA: 0
EYE PAIN: 0
HEADACHES: 0
PALPITATIONS: 0

## 2023-06-05 NOTE — PROGRESS NOTES
SUBJECTIVE:   CC: Jennifer is an 28 year old who presents for preventive health visit.  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  She has no health concerns today.  Reports that she has an infant son at home who has required extensive cardiac procedures and hospitalizations since his birth.  She does state that she has been handling this well, but did believe that she does have some anxiety related to his health.  He is doing well.  She has done counseling in the past, but is not currently.  Additionally, she is attempting to increase her exercise and improve her diet.  She is currently doing the whole 30 diet and reports that she is lost approximately 10 pounds in the last 2 weeks.        6/5/2023    11:03 AM   Additional Questions   Roomed by ac   Accompanied by self     Healthy Habits:     Getting at least 3 servings of Calcium per day:  Yes    Bi-annual eye exam:  Yes    Dental care twice a year:  NO    Sleep apnea or symptoms of sleep apnea:  None    Diet:  Other    Frequency of exercise:  2-3 days/week    Duration of exercise:  30-45 minutes    Taking medications regularly:  Yes    Medication side effects:  Not applicable    PHQ-2 Total Score: 0    Additional concerns today:  Yes    Paleo/Whole 30, exercising three times weekly. Enjoys walking. Weights. Lost 10lbs in 2 weeks.     Pap smear done on this date: 2021 (approximately), by this group: [facility], results were NORMAL.     Today's PHQ-2 Score:       6/5/2023    10:59 AM   PHQ-2 ( 1999 [employer])   Q1: Little interest or pleasure in doing things 0   Q2: Feeling down, depressed or hopeless 0   PHQ-2 Score 0   Q1: Little interest or pleasure in doing things Not at all   Q2: Feeling down, depressed or hopeless Not at all   PHQ-2 Score 0        Have you ever done Advance Care Planning? (For example, a Health Directive, POLST, or a discussion with a medical provider or your loved ones about your wishes): No, advance care planning information given to patient to review.  Patient declined advance care planning discussion at this time.    Social History     Tobacco Use     Smoking status: Never     Passive exposure: Never     Smokeless tobacco: Never   Vaping Use     Vaping status: Never Used   Substance Use Topics     Alcohol use: Not Currently         2023     9:57 AM   Alcohol Use   Prescreen: >3 drinks/day or >7 drinks/week? No     Reviewed orders with patient.  Reviewed health maintenance and updated orders accordingly - Yes  Lab work is in process  Labs reviewed in EPIC  BP Readings from Last 3 Encounters:   23 125/85   23 122/84   22 (!) 135/94    Wt Readings from Last 3 Encounters:   23 99.3 kg (218 lb 14.4 oz)   23 102.1 kg (225 lb)   22 110 kg (242 lb 9.6 oz)            Patient Active Problem List   Diagnosis     Fetal cardiac anomaly affecting pregnancy, antepartum     History of gestational hypertension     Adopted     Routine general medical examination at a health care facility     Generalized anxiety disorder     Past Surgical History:   Procedure Laterality Date      SECTION N/A 2022    Procedure:  SECTION;  Surgeon: Joleen Alberto MD;  Location:  OR       Social History     Tobacco Use     Smoking status: Never     Passive exposure: Never     Smokeless tobacco: Never   Vaping Use     Vaping status: Never Used   Substance Use Topics     Alcohol use: Not Currently     Family History   Adopted: Yes   Problem Relation Age of Onset     Unknown/Adopted No family hx of          No current outpatient medications on file.     No Known Allergies  Recent Labs   Lab Test 23  1154 22  1008 22  1025 10/31/22  0945 10/31/22  0945 22  1310 21  1605  17  1655 16  1119   A1C 5.2  --   --   --   --   --   --  5.1  --    LDL  --   --   --   --   --   --   --   --  119   HDL  --   --   --   --   --   --   --   --  46*   TRIG  --   --   --   --   --   --   --   --  94   ALT  --  22 22  --  20  --   --   --   --    CR  --  0.65 0.66   < > 0.64  --   --   --   --    GFRESTIMATED  --  >90 >90   < > >90  --   --   --   --    TSH  --   --   --   --   --  1.83 1.96  --   --     < > = values in this interval not displayed.        Breast Cancer Screenin/1/2023     9:58 AM   Breast CA Risk Assessment (FHS-7)   Do you have a family history of breast, colon, or ovarian cancer? No / Unknown     click delete button to remove this line now  Patient under 40 years of age: Routine Mammogram Screening not recommended.   Pertinent mammograms are reviewed under the imaging tab.    History of abnormal Pap smear: NO - age 30- 65 PAP every 3 years recommended      2016    11:17 AM   PAP / HPV   PAP Negative for squamous intraepithelial lesion or malignancy  Electronically signed by Mi Fuentes CT (ASCP) on 2016 at 12:46 PM         Reviewed and updated as needed this visit by clinical staff   Tobacco  Allergies  Meds  Problems  Med Hx  Surg Hx  Fam Hx          Reviewed and updated as needed this visit by Provider   Tobacco  Allergies  Meds  Problems  Med Hx  Surg Hx  Fam Hx         Review of Systems   Constitutional: Negative for chills and fever.   HENT: Negative for congestion, ear pain, hearing loss and sore throat.    Eyes: Negative for pain and visual disturbance.   Respiratory: Negative for cough and shortness of breath.    Cardiovascular: Negative for chest pain, palpitations and peripheral edema.   Gastrointestinal: Negative for abdominal pain, constipation, diarrhea, heartburn, hematochezia and nausea.   Breasts:  Negative for tenderness, breast mass and discharge.   Genitourinary: Negative for dysuria, frequency, genital sores,  "hematuria, pelvic pain, urgency, vaginal bleeding and vaginal discharge.   Musculoskeletal: Negative for arthralgias, joint swelling and myalgias.   Skin: Negative for rash.   Neurological: Negative for dizziness, weakness, headaches and paresthesias.   Psychiatric/Behavioral: Negative for mood changes. The patient is nervous/anxious.      OBJECTIVE:   /85 (BP Location: Left arm, Patient Position: Sitting, Cuff Size: Adult Large)   Pulse 76   Temp 98.4  F (36.9  C) (Oral)   Resp 16   Ht 1.727 m (5' 8\")   Wt 99.3 kg (218 lb 14.4 oz)   LMP 05/31/2023 (Exact Date)   SpO2 100%   Breastfeeding No   BMI 33.28 kg/m    Physical Exam  GENERAL: healthy, alert and no distress  EYES: Eyes grossly normal to inspection, PERRL and conjunctivae and sclerae normal  HENT: ear canals and TM's normal, nose and mouth without ulcers or lesions  NECK: no adenopathy, no asymmetry, masses, or scars and thyroid normal to palpation  RESP: lungs clear to auscultation - no rales, rhonchi or wheezes  BREAST: patient declined; discussed breast awareness  CV: regular rate and rhythm, normal S1 S2, no S3 or S4, no murmur, click or rub, no peripheral edema and peripheral pulses strong  ABDOMEN: soft, nontender, no hepatosplenomegaly, no masses and bowel sounds normal  MS: no gross musculoskeletal defects noted, no edema  SKIN: no suspicious lesions or rashes  NEURO: Normal strength and tone, mentation intact and speech normal  PSYCH: mentation appears normal, affect normal/bright    ASSESSMENT/PLAN:     Problem List Items Addressed This Visit        Behavioral    Generalized anxiety disorder     Has noticed an increase in anxiety symptoms since the birth of her son and his subsequent health challenges.  She is interested in a referral to mental health for counseling, and I placed this today.  Can consider additional things such as medication management of her mood if talk therapy alone is not sufficient.         Relevant Orders    " Adult Mental Health  Referral       Other    History of gestational hypertension     Developed during the third trimester of her most recent pregnancy.  Her son was born in November 2022.  She reports that she was on nifedipine for approximately a month postpartum, but has not taken it a period of time.  No instances of elevated blood pressure since the discontinuation of this medication.  Blood pressure today in clinic is 125/85.         Adopted    Routine general medical examination at a health care facility - Primary     Annual exam.  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  Variety of labs drawn, including CBC, hemoglobin A1c, iron and ferritin studies TSH and vitamin D deficiency per patient request.  Had a normal lipid panel approximately 3 years ago.  Titers and TB Gold test also done for her program.  BMI is 33; discussed diet and exercise.  Patient is still postpartum, and is actively embarking on a diet/exercise plan.  She will follow-up with myself as needed for any additional support.  Follow-up in 1 year for next annual exam or sooner with any acute concerns.        Other Visit Diagnoses     Screening for thyroid disorder        Relevant Orders    TSH with free T4 reflex    Screening for diabetes mellitus        Relevant Orders    Hemoglobin A1c (Completed)    Screening for iron deficiency anemia        Relevant Orders    Ferritin    Iron and iron binding capacity    CBC with platelets (Completed)    Encounter for vitamin deficiency screening        Relevant Orders    Vitamin D Deficiency    Screening examination for pulmonary tuberculosis        Relevant Orders    Quantiferon TB Gold Plus    Measles, mumps, rubella (MMR) vaccination status unknown        Relevant Orders    Rubeola Antibody IgG    Mumps Immune Status, IgG    Rubella Antibody IgG        COUNSELING:  Reviewed preventive health counseling, as reflected in patient  "instructions       Regular exercise       Healthy diet/nutrition       Immunizations    Vaccinated for: Covid-19         Osteoporosis prevention/bone health       Advance Care Planning      BMI:   Estimated body mass index is 33.28 kg/m  as calculated from the following:    Height as of this encounter: 1.727 m (5' 8\").    Weight as of this encounter: 99.3 kg (218 lb 14.4 oz).     Weight management plan: Discussed healthy diet and exercise guidelines      She reports that she has never smoked. She has never been exposed to tobacco smoke. She has never used smokeless tobacco.    MARIO Pierce CNP  [facility]  "

## 2023-06-05 NOTE — ASSESSMENT & PLAN NOTE
Has noticed an increase in anxiety symptoms since the birth of her son and his subsequent health challenges.  She is interested in a referral to mental health for counseling, and I placed this today.  Can consider additional things such as medication management of her mood if talk therapy alone is not sufficient.

## 2023-06-05 NOTE — ASSESSMENT & PLAN NOTE
Annual exam.  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  Variety of labs drawn, including CBC, hemoglobin A1c, iron and ferritin studies TSH and vitamin D deficiency per patient request.  Had a normal lipid panel approximately 3 years ago.  Titers and TB Gold test also done for her program.  BMI is 33; discussed diet and exercise.  Patient is still postpartum, and is actively embarking on a diet/exercise plan.  She will follow-up with myself as needed for any additional support.  Follow-up in 1 year for next annual exam or sooner with any acute concerns.

## 2023-06-05 NOTE — ASSESSMENT & PLAN NOTE
Developed during the third trimester of her most recent pregnancy.  Her son was born in November 2022.  She reports that she was on nifedipine for approximately a month postpartum, but has not taken it a period of time.  No instances of elevated blood pressure since the discontinuation of this medication.  Blood pressure today in clinic is 125/85.

## 2023-06-06 LAB
DEPRECATED CALCIDIOL+CALCIFEROL SERPL-MC: 38 UG/L (ref 20–75)
FERRITIN SERPL-MCNC: 56 NG/ML (ref 6–175)
IRON BINDING CAPACITY (ROCHE): 305 UG/DL (ref 240–430)
IRON SATN MFR SERPL: 26 % (ref 15–46)
IRON SERPL-MCNC: 78 UG/DL (ref 37–145)
MEV IGG SER IA-ACNC: 218 AU/ML
MEV IGG SER IA-ACNC: POSITIVE
MUMPS ANTIBODY IGG INSTRUMENT VALUE: 48 AU/ML
MUV IGG SER QL IA: POSITIVE
RUBV IGG SERPL QL IA: 2.36 INDEX
RUBV IGG SERPL QL IA: POSITIVE
TSH SERPL DL<=0.005 MIU/L-ACNC: 3.35 UIU/ML (ref 0.3–4.2)

## 2023-06-07 LAB
GAMMA INTERFERON BACKGROUND BLD IA-ACNC: 0 IU/ML
M TB IFN-G BLD-IMP: NEGATIVE
M TB IFN-G CD4+ BCKGRND COR BLD-ACNC: 10 IU/ML
MITOGEN IGNF BCKGRD COR BLD-ACNC: 0 IU/ML
MITOGEN IGNF BCKGRD COR BLD-ACNC: 0 IU/ML
QUANTIFERON MITOGEN: 10 IU/ML
QUANTIFERON NIL TUBE: 0 IU/ML
QUANTIFERON TB1 TUBE: 0 IU/ML
QUANTIFERON TB2 TUBE: 0

## 2023-06-15 DIAGNOSIS — Z30.9 ENCOUNTER FOR CONTRACEPTIVE MANAGEMENT, UNSPECIFIED TYPE: Primary | ICD-10-CM

## 2023-06-21 ENCOUNTER — OFFICE VISIT (OUTPATIENT)
Dept: MIDWIFE SERVICES | Facility: CLINIC | Age: 28
End: 2023-06-21
Payer: COMMERCIAL

## 2023-06-21 VITALS
BODY MASS INDEX: 32.99 KG/M2 | SYSTOLIC BLOOD PRESSURE: 122 MMHG | DIASTOLIC BLOOD PRESSURE: 78 MMHG | HEART RATE: 74 BPM | WEIGHT: 217 LBS

## 2023-06-21 DIAGNOSIS — Z30.9 ENCOUNTER FOR CONTRACEPTIVE MANAGEMENT, UNSPECIFIED TYPE: ICD-10-CM

## 2023-06-21 DIAGNOSIS — Z01.812 PRE-PROCEDURE LAB EXAM: ICD-10-CM

## 2023-06-21 DIAGNOSIS — Z12.4 SCREENING FOR CERVICAL CANCER: ICD-10-CM

## 2023-06-21 DIAGNOSIS — Z30.430 ENCOUNTER FOR INSERTION OF INTRAUTERINE CONTRACEPTIVE DEVICE: Primary | ICD-10-CM

## 2023-06-21 PROBLEM — Z87.59 HISTORY OF GESTATIONAL HYPERTENSION: Status: RESOLVED | Noted: 2022-11-16 | Resolved: 2023-06-21

## 2023-06-21 PROBLEM — Z00.00 ROUTINE GENERAL MEDICAL EXAMINATION AT A HEALTH CARE FACILITY: Status: RESOLVED | Noted: 2023-06-05 | Resolved: 2023-06-21

## 2023-06-21 PROBLEM — O35.BXX0 FETAL CARDIAC ANOMALY AFFECTING PREGNANCY, ANTEPARTUM: Status: RESOLVED | Noted: 2022-09-19 | Resolved: 2023-06-21

## 2023-06-21 LAB — HCG UR QL: NEGATIVE

## 2023-06-21 PROCEDURE — 81025 URINE PREGNANCY TEST: CPT | Performed by: ADVANCED PRACTICE MIDWIFE

## 2023-06-21 PROCEDURE — 58300 INSERT INTRAUTERINE DEVICE: CPT | Performed by: ADVANCED PRACTICE MIDWIFE

## 2023-06-21 PROCEDURE — G0145 SCR C/V CYTO,THINLAYER,RESCR: HCPCS | Performed by: ADVANCED PRACTICE MIDWIFE

## 2023-06-21 NOTE — PROGRESS NOTES
IUD Insertion:  CONSULT:    Is a pregnancy test required: Yes.  Was it positive or negative?  Negative  Was a consent obtained?  Yes, verbal and written informed consent obtained.    Subjective: Jennifer El is a 28 year old  presents for IUD and desires Kyleena type IUD. Patient has had a copper IUD in the past and it wasn't well tolerated. Considering another pregnancy in a couple of years. Sexually active with one male partner. Normotensive today. Also desires pap smear today. Denies hx of abnormal pap. Thinks her last one was done a couple of years ago.    Patient has been given the opportunity to ask questions about all forms of birth control, including all options appropriate for Jennifer El. Jennifer El understands she may have the IUD removed at any time. IUD should be removed by a health care provider.    The entire insertion procedure was reviewed with the patient, including care after placement.    Patient's last menstrual period was 2023 (exact date). Periods are fairly regular, every 28-33 days. Denies unprotected intercourse within the past 2 weeks. No allergy to betadine or shellfish. Patient declines STD screening     hCG Urine Qualitative   Date Value Ref Range Status   2023 Negative Negative Final     Comment:     This test is for screening purposes.  Results should be interpreted along with the clinical picture.  Confirmation testing is available if warranted by ordering [id number], HCG Quantitative Pregnancy.       /78 (BP Location: Left arm, Patient Position: Sitting, Cuff Size: Adult Large)   Pulse 74   Wt 98.4 kg (217 lb)   LMP 2023 (Exact Date)   BMI 32.99 kg/m      Pelvic Exam:   EG/BUS: normal genital architecture without lesions, erythema or abnormal secretions.   Vagina: moist, pink, rugae with physiologic discharge and secretions  Cervix: nulliparous, no lesions and pink, moist, closed, no CMT. Pap obtained prior to IUD insertion.  Uterus:  midposition, mobile, no pain    PROCEDURE NOTE: -- IUD Insertion    Reason for Insertion: contraception    Premedicated with ibuprofen.  Under sterile technique, cervix was visualized with speculum and prepped with Betadine solution swab x 3. Tenaculum was placed for stability. The uterus was gently straightened and sounded to 8.0 cm. IUD prepared for placement, and IUD inserted according to 's instructions without difficulty or significant resitance, and deployed at the fundus. The strings were visualized and trimmed to 2.5 cm from the external os. Tenaculum was removed and hemostasis noted. Speculum removed.  Patient tolerated procedure well.    Lot # [id number]  Exp: NOV 2024    EBL: minimal      Complications: none    ASSESSMENT:     ICD-10-CM    1. Encounter for insertion of intrauterine contraceptive device  Z30.430 levonorgestrel (KYLEENA) 19.5 MG IUD     levonorgestrel (KYLEENA) 19.5 MG IUD 1 each     INSERTION INTRAUTERINE DEVICE      2. Encounter for contraceptive management, unspecified type  Z30.9 Ob/Gyn Referral      3. Pre-procedure lab exam  Z01.812 HCG qualitative urine     HCG qualitative urine           PLAN:    Given 's handouts, including when to have IUD removed, list of danger s/sx, side effects and follow up recommended. Encouraged condom use for prevention of STD. Back up contraception advised for 7 days if progestin method. Advised to call for any fever, for prolonged or severe pain or bleeding, abnormal vaginal discharge, or unable to palpate strings. She was advised to use pain medications (ibuprofen) as needed for mild to moderate pain. Advised to follow-up in clinic in 4-6 weeks for IUD string check if unable to find strings or as directed by provider.     Pap team to report on pap result.    Carmenza Rollins CNM

## 2023-06-27 LAB
BKR LAB AP GYN ADEQUACY: NORMAL
BKR LAB AP GYN INTERPRETATION: NORMAL
BKR LAB AP HPV REFLEX: NORMAL
BKR LAB AP LMP: NORMAL
BKR LAB AP PREVIOUS ABNORMAL: NORMAL
PATH REPORT.COMMENTS IMP SPEC: NORMAL
PATH REPORT.COMMENTS IMP SPEC: NORMAL
PATH REPORT.RELEVANT HX SPEC: NORMAL

## 2023-08-16 ENCOUNTER — E-VISIT (OUTPATIENT)
Dept: FAMILY MEDICINE | Facility: CLINIC | Age: 28
End: 2023-08-16
Payer: COMMERCIAL

## 2023-08-16 DIAGNOSIS — R05.1 ACUTE COUGH: Primary | ICD-10-CM

## 2023-08-16 PROCEDURE — 99421 OL DIG E/M SVC 5-10 MIN: CPT

## 2023-08-16 NOTE — LETTER
August 16, 2023      Jennifer El  [address] [place] [address]        To Whom It May Concern:    Jennifer El  completed a visit with me on 8/16/2023.  Please excuse her  until 8/18/2023 due to illness.        Sincerely,        MARIO Pierce CNP

## 2023-08-16 NOTE — PATIENT INSTRUCTIONS
Dear Jennifer El    Please see my message via the [employer] system. Continue symptomatic care as you have been and reach out with any additional needs!     Thanks for choosing us as your health care partner,    MARIO Pierce CNP

## 2023-09-28 ENCOUNTER — IMMUNIZATION (OUTPATIENT)
Dept: FAMILY MEDICINE | Facility: CLINIC | Age: 28
End: 2023-09-28
Payer: COMMERCIAL

## 2023-09-28 PROCEDURE — 90686 IIV4 VACC NO PRSV 0.5 ML IM: CPT

## 2023-09-28 PROCEDURE — 90471 IMMUNIZATION ADMIN: CPT

## 2023-10-06 ENCOUNTER — OFFICE VISIT (OUTPATIENT)
Dept: FAMILY MEDICINE | Facility: CLINIC | Age: 28
End: 2023-10-06
Payer: COMMERCIAL

## 2023-10-06 VITALS
TEMPERATURE: 98.4 F | DIASTOLIC BLOOD PRESSURE: 86 MMHG | OXYGEN SATURATION: 97 % | HEART RATE: 80 BPM | RESPIRATION RATE: 16 BRPM | SYSTOLIC BLOOD PRESSURE: 127 MMHG

## 2023-10-06 DIAGNOSIS — J02.9 VIRAL PHARYNGITIS: Primary | ICD-10-CM

## 2023-10-06 DIAGNOSIS — J02.9 SORETHROAT: ICD-10-CM

## 2023-10-06 LAB
DEPRECATED S PYO AG THROAT QL EIA: NEGATIVE
GROUP A STREP BY PCR: NOT DETECTED

## 2023-10-06 PROCEDURE — 87651 STREP A DNA AMP PROBE: CPT | Performed by: FAMILY MEDICINE

## 2023-10-06 PROCEDURE — 99213 OFFICE O/P EST LOW 20 MIN: CPT | Performed by: FAMILY MEDICINE

## 2023-10-06 NOTE — PROGRESS NOTES
Assessment:       Viral pharyngitis    Sorethroat  - Streptococcus A Rapid Screen w/Reflex to PCR - Clinic Collect  - Group A Streptococcus PCR Throat Swab         Plan:     Symptoms consistent with a likely viral pharyngitis.  Rapid strep screen negative.  Recommend symptomatic care at home.  No antibiotics indicated at this time.  Follow-up if symptoms getting worse or not improving over the next week.    MEDICATIONS:   No orders of the defined types were placed in this encounter.        Subjective:       28 year old female presents for evaluation of a 2-day history of sore throat and headache.  She denies nasal congestion or cough.  No fever.  She has felt like she has had some swelling of her lymph nodes in her neck.  She denies fevers or chills or body aches.  She has had some mild left ear pain.  She has taken Tylenol and ibuprofen for symptoms which has been helpful somewhat.      Patient Active Problem List   Diagnosis    Adopted    Generalized anxiety disorder       Past Medical History:   Diagnosis Date    Anomaly of heart of fetus affecting pregnancy, antepartum, single or unspecified fetus 2022    [facility]    Gestational hypertension     Migraines        Past Surgical History:   Procedure Laterality Date     SECTION N/A 2022    Procedure:  SECTION;  Surgeon: Joleen Alberto MD;  Location: [facility]       Current Outpatient Medications   Medication    levonorgestrel (KYLEENA) 19.5 MG IUD     No current facility-administered medications for this visit.       No Known Allergies    Family History   Adopted: Yes   Problem Relation Age of Onset    Heart Defect Son         [facility]    Unknown/Adopted No family hx of        Social History     Socioeconomic History    Marital status:      Spouse name: Allie    Number of children: None    Years of education: None    Highest education level: None   Tobacco Use    Smoking status: Never     Passive exposure: Never    Smokeless  tobacco: Never   Vaping Use    Vaping Use: Never used   Substance and Sexual Activity    Alcohol use: Yes     Comment: ocassionally    Drug use: Never    Sexual activity: Yes     Partners: Male     Birth control/protection: Condom         Review of Systems  Pertinent items are noted in HPI.      Objective:                 General Appearance:    /86   Pulse 80   Temp 98.4  F (36.9  C) (Oral)   Resp 16   SpO2 97%         Alert, pleasant, cooperative, no distress, appears stated age   Head:    Normocephalic, without obvious abnormality, atraumatic   Eyes:    Conjunctiva/corneas clear   Ears:    Normal TM's without erythema or bulging. Normal external ear canals, both ears   Nose:   Nares normal, septum midline, mucosa normal, no drainage    or sinus tenderness   Throat: Bilateral tonsillar erythema.  No significant exudates seen.  Mild tonsillar hypertrophy noted.  Mucous members are moist.   Neck:   Supple, symmetrical, trachea midline, no adenopathy    Lungs:     Clear to auscultation bilaterally without wheezes, rales, or rhonchi, respirations unlabored    Heart:    Regular rate and rhythm, S1 and S2 normal, no murmur, rub or gallop       Extremities:   Extremities normal, atraumatic, no cyanosis or edema   Skin:   Skin color, texture, turgor normal, no rashes or lesions           Results for orders placed or performed in visit on 10/06/23   Streptococcus A Rapid Screen w/Reflex to PCR - Clinic Collect     Status: Normal    Specimen: Throat; Swab   Result Value Ref Range    Group A Strep antigen Negative Negative       This note has been dictated using voice recognition software. Any grammatical or context distortions are unintentional and inherent to the software

## 2023-12-11 ENCOUNTER — LAB REQUISITION (OUTPATIENT)
Dept: LAB | Facility: CLINIC | Age: 28
End: 2023-12-11
Payer: COMMERCIAL

## 2023-12-11 DIAGNOSIS — Z34.81 ENCOUNTER FOR SUPERVISION OF OTHER NORMAL PREGNANCY, FIRST TRIMESTER: ICD-10-CM

## 2023-12-11 LAB
HCG INTACT+B SERPL-ACNC: <1 MIU/ML
HCG SERPL QL: NEGATIVE

## 2023-12-11 PROCEDURE — 84702 CHORIONIC GONADOTROPIN TEST: CPT | Mod: ORL | Performed by: MIDWIFE

## 2023-12-11 PROCEDURE — 84703 CHORIONIC GONADOTROPIN ASSAY: CPT | Mod: ORL | Performed by: MIDWIFE

## 2024-01-08 ENCOUNTER — OFFICE VISIT (OUTPATIENT)
Dept: FAMILY MEDICINE | Facility: CLINIC | Age: 29
End: 2024-01-08
Payer: COMMERCIAL

## 2024-01-08 ENCOUNTER — TELEPHONE (OUTPATIENT)
Dept: FAMILY MEDICINE | Facility: CLINIC | Age: 29
End: 2024-01-08

## 2024-01-08 ENCOUNTER — HOSPITAL ENCOUNTER (OUTPATIENT)
Dept: CT IMAGING | Facility: HOSPITAL | Age: 29
Discharge: HOME | End: 2024-01-08
Payer: COMMERCIAL

## 2024-01-08 VITALS
DIASTOLIC BLOOD PRESSURE: 83 MMHG | HEIGHT: 68 IN | HEART RATE: 84 BPM | SYSTOLIC BLOOD PRESSURE: 117 MMHG | BODY MASS INDEX: 32.59 KG/M2 | RESPIRATION RATE: 16 BRPM | OXYGEN SATURATION: 98 % | TEMPERATURE: 97.8 F | WEIGHT: 215.06 LBS

## 2024-01-08 DIAGNOSIS — R30.0 DYSURIA: Primary | ICD-10-CM

## 2024-01-08 DIAGNOSIS — R10.31 RLQ ABDOMINAL PAIN: ICD-10-CM

## 2024-01-08 DIAGNOSIS — M25.551 HIP PAIN, RIGHT: ICD-10-CM

## 2024-01-08 LAB
ALBUMIN SERPL BCG-MCNC: 4.3 G/DL (ref 3.5–5.2)
ALBUMIN UR-MCNC: NEGATIVE MG/DL
ALP SERPL-CCNC: 91 U/L (ref 40–150)
ALT SERPL W P-5'-P-CCNC: 20 U/L (ref 0–50)
ANION GAP SERPL CALCULATED.3IONS-SCNC: 11 MMOL/L (ref 7–15)
APPEARANCE UR: ABNORMAL
AST SERPL W P-5'-P-CCNC: 19 U/L (ref 0–45)
BACTERIA #/AREA URNS HPF: ABNORMAL /HPF
BASOPHILS # BLD AUTO: 0.1 10E3/UL (ref 0–0.2)
BASOPHILS NFR BLD AUTO: 0 %
BILIRUB SERPL-MCNC: 0.5 MG/DL
BILIRUB UR QL STRIP: NEGATIVE
BUN SERPL-MCNC: 11.3 MG/DL (ref 6–20)
CALCIUM SERPL-MCNC: 9.6 MG/DL (ref 8.6–10)
CHLORIDE SERPL-SCNC: 101 MMOL/L (ref 98–107)
CLUE CELLS: NORMAL
COLOR UR AUTO: YELLOW
CREAT SERPL-MCNC: 0.76 MG/DL (ref 0.51–0.95)
DEPRECATED HCO3 PLAS-SCNC: 28 MMOL/L (ref 22–29)
EGFRCR SERPLBLD CKD-EPI 2021: >90 ML/MIN/1.73M2
EOSINOPHIL # BLD AUTO: 0.1 10E3/UL (ref 0–0.7)
EOSINOPHIL NFR BLD AUTO: 1 %
ERYTHROCYTE [DISTWIDTH] IN BLOOD BY AUTOMATED COUNT: 12.1 % (ref 10–15)
GLUCOSE SERPL-MCNC: 58 MG/DL (ref 70–99)
GLUCOSE UR STRIP-MCNC: NEGATIVE MG/DL
HCT VFR BLD AUTO: 42.7 % (ref 35–47)
HGB BLD-MCNC: 14.1 G/DL (ref 11.7–15.7)
HGB UR QL STRIP: ABNORMAL
IMM GRANULOCYTES # BLD: 0 10E3/UL
IMM GRANULOCYTES NFR BLD: 0 %
KETONES UR STRIP-MCNC: NEGATIVE MG/DL
LEUKOCYTE ESTERASE UR QL STRIP: ABNORMAL
LYMPHOCYTES # BLD AUTO: 2.8 10E3/UL (ref 0.8–5.3)
LYMPHOCYTES NFR BLD AUTO: 24 %
MCH RBC QN AUTO: 29 PG (ref 26.5–33)
MCHC RBC AUTO-ENTMCNC: 33 G/DL (ref 31.5–36.5)
MCV RBC AUTO: 88 FL (ref 78–100)
MONOCYTES # BLD AUTO: 0.8 10E3/UL (ref 0–1.3)
MONOCYTES NFR BLD AUTO: 6 %
NEUTROPHILS # BLD AUTO: 7.9 10E3/UL (ref 1.6–8.3)
NEUTROPHILS NFR BLD AUTO: 68 %
NITRATE UR QL: NEGATIVE
PH UR STRIP: 5.5 [PH] (ref 5–8)
PLATELET # BLD AUTO: 410 10E3/UL (ref 150–450)
POTASSIUM SERPL-SCNC: 3.9 MMOL/L (ref 3.4–5.3)
PROT SERPL-MCNC: 7.6 G/DL (ref 6.4–8.3)
RBC # BLD AUTO: 4.86 10E6/UL (ref 3.8–5.2)
RBC #/AREA URNS AUTO: ABNORMAL /HPF
SODIUM SERPL-SCNC: 140 MMOL/L (ref 135–145)
SP GR UR STRIP: <=1.005 (ref 1–1.03)
SQUAMOUS #/AREA URNS AUTO: ABNORMAL /LPF
TRICHOMONAS, WET PREP: NORMAL
UROBILINOGEN UR STRIP-ACNC: 0.2 E.U./DL
WBC # BLD AUTO: 11.7 10E3/UL (ref 4–11)
WBC #/AREA URNS AUTO: ABNORMAL /HPF
WBC'S/HIGH POWER FIELD, WET PREP: NORMAL
YEAST, WET PREP: NORMAL

## 2024-01-08 PROCEDURE — 87186 SC STD MICRODIL/AGAR DIL: CPT

## 2024-01-08 PROCEDURE — 36415 COLL VENOUS BLD VENIPUNCTURE: CPT

## 2024-01-08 PROCEDURE — 81001 URINALYSIS AUTO W/SCOPE: CPT

## 2024-01-08 PROCEDURE — 85025 COMPLETE CBC W/AUTO DIFF WBC: CPT

## 2024-01-08 PROCEDURE — 87086 URINE CULTURE/COLONY COUNT: CPT

## 2024-01-08 PROCEDURE — 74178 CT ABD&PLV WO CNTR FLWD CNTR: CPT

## 2024-01-08 PROCEDURE — 250N000011 HC RX IP 250 OP 636

## 2024-01-08 PROCEDURE — 99213 OFFICE O/P EST LOW 20 MIN: CPT

## 2024-01-08 PROCEDURE — 80053 COMPREHEN METABOLIC PANEL: CPT

## 2024-01-08 PROCEDURE — 87210 SMEAR WET MOUNT SALINE/INK: CPT

## 2024-01-08 RX ORDER — IOPAMIDOL 755 MG/ML
90 INJECTION, SOLUTION INTRAVASCULAR ONCE
Status: COMPLETED | OUTPATIENT
Start: 2024-01-08 | End: 2024-01-08

## 2024-01-08 RX ADMIN — IOPAMIDOL 90 ML: 755 INJECTION, SOLUTION INTRAVENOUS at 17:57

## 2024-01-08 ASSESSMENT — ENCOUNTER SYMPTOMS: HIP PAIN: 1

## 2024-01-08 NOTE — TELEPHONE ENCOUNTER
Patient is agreeable to switch from US to CT. Writer will call imaging once order placed and see if they can accommodate and update patient.     Patient wondering what threshold is to diagnose UTI as urine appears dirty but provider comments that she does not suspect UTI.

## 2024-01-08 NOTE — LETTER
1/8/2024        RE: Jennifer El  [address] [place] [address]          {PROVIDER CHARTING PREFERENCE:149846}    Subjective  Jennifer is a 28 year old, presenting for the following health issues:  Hip Pain (RT side radiates to groin for 2 weeks.) and Urinary Problem (Burning with urination for 2 weeks.)      1/8/2024     8:05 AM   Additional Questions   Roomed by sac   Accompanied by self         1/8/2024     8:05 AM   Patient Reported Additional Medications   Patient reports taking the following new medications no     Urinary symptoms:  Present for 2 days  Frequent urination, urgency, burning with urination. Lower abdominal cramping with passing of urine. No blood in the urine that she is aware of, flank pain, no fevers. No vaginal discharge.     Hip pain:  Present for 2 weeks  No known injury or strain  History of low back pain with generally responds to conservative cares such as ibuprofen, rest, ice, chiropractic care.   Pain is present to the right hip with radiation into the right groin   Pain is occasionally sharp, otherwise tends to be dull.  Aggravating factors are hard for patient to identify. Is sometimes worsened by activity/movements but not always.   Alleviating factors include rest (better symptoms in the morning)   No paresthesias, weakness  Has participated in ibuprofen, rest, ice.     History of Present Illness       Reason for visit:  Hip groin pain suspect uti  Symptom onset:  1-2 weeks ago  Symptoms include:  Pain burning  Symptom intensity:  Moderate  Symptom progression:  Improving  Had these symptoms before:  No    She eats 2-3 servings of fruits and vegetables daily.She consumes 0 sweetened beverage(s) daily.She exercises with enough effort to increase her heart rate 9 or less minutes per day.  She exercises with enough effort to increase her heart rate 3 or less days per week.   She is taking medications regularly.     Review of Systems         Objective   LMP  12/28/2023 (Exact Date)   There is no height or weight on file to calculate BMI.    Physical Exam  Vitals and nursing note reviewed.   Constitutional:       General: She is not in acute distress.     Appearance: Normal appearance. She is not ill-appearing or diaphoretic.   Cardiovascular:      Rate and Rhythm: Normal rate and regular rhythm.   Pulmonary:      Effort: Pulmonary effort is normal. No respiratory distress.   Abdominal:      General: Abdomen is flat. Bowel sounds are normal.      Palpations: Abdomen is soft.      Tenderness: There is abdominal tenderness in the right upper quadrant and right lower quadrant. There is right CVA tenderness. There is no guarding or rebound.   Neurological:      Mental Status: She is alert.        {Diagnostic Test Results (Optional):490316}                    Sincerely,        MARIO Pierce CNP

## 2024-01-08 NOTE — LETTER
January 8, 2024      Jennifer El  [address] [place] [address]        To Whom It May Concern:    Jennifer El was seen in our clinic.      Sincerely,      Sara Mc

## 2024-01-08 NOTE — TELEPHONE ENCOUNTER
----- Message from MARIO Pierce CNP sent at 1/8/2024 10:28 AM CST -----  Please call patient and relay labs. Her wet prep was negative and her urine does not look diagnostic of an infection. However, her white count is slightly elevated and she has a large amount of blood noted in the urine. I would actually like to switch our imaging that we discussed to a CT of the abdomen as opposed to an ultrasound. That's going to give me visualization of her right kidney, appendix, and ovary on that side. If she is amenable to this, we will need to get her appointment changed from the ultrasound at 4:30 and verify when they could accommodate a CT. Thank you!   MARIO Pierce CNP on 1/8/2024 at 10:28 AM

## 2024-01-08 NOTE — TELEPHONE ENCOUNTER
Detailed vm left for patient with providers message below. Informed patient that CT scan was scheduled for 6pm tonight, arrival time of 5:45pm at imaging center (same location as previously scheduled US). Patient should plan to drink 1L fluids 1hr prior to procedure per imaging scheduler.     Advised patient to call back with questions/concerns.

## 2024-01-08 NOTE — TELEPHONE ENCOUNTER
Left message to call back for: Jennifer  Information to relay to patient: Please notify patient of Sara's message and help schedule CT scan instead of U/S for today. Thank you.

## 2024-01-08 NOTE — TELEPHONE ENCOUNTER
The official diagnostic criteria of a urinary tract infection is the presence of nitrites. We tend to look at the results as a whole, however, and not just one component. She has some leukocytes which are suggestive, however not diagnostic of a UTI. The fact that she has squamous epithelial cells makes me question contamination of the sample. Because of the confluence of other things going on, I would recommend that we complete the imaging to make sure nothing else is causing symptoms as opposed to prescribing an antibiotic empirically. That being said, a urine culture will come back in 24 hours and would give us the official answer and I would definitely treat based on that in conjunction with the imaging completed.    Also please note that the CT has been ordered.

## 2024-01-08 NOTE — PROGRESS NOTES
Assessment & Plan   Problem List Items Addressed This Visit       RLQ abdominal pain     Workup today in clinic significant for mild leukocytosis with a WBC of 11.7, large amount of blood noted to UA, and negative wet prep. Some leukocytes and moderate bacteria on UA, but also epithelial cells so question contamination and will wait for both imaging and culture results as opposed to empirical treatment. We discussed potential etiologies of the pain she is describing including muscular pain of the right hip, appendicitis, ovarian cyst, nephrolithiasis and pyelonephritis. Would like to obtain CT imaging to assess more in depth and this order was placed and will plan to treat as appropriate. She is non toxic appearing in clinic today with stable vital signs, however we discussed that if symptoms change or worsen I would recommend immediate reevaluation. Patient expressed an understanding of and agreement with this plan. All questions were answered.         Relevant Orders    Wet prep - Clinic Collect (Completed)    UA Macroscopic with reflex to Microscopic and Culture - Clinic Collect (Completed)    Comprehensive metabolic panel (BMP + Alb, Alk Phos, ALT, AST, Total. Bili, TP)    CBC with platelets and differential (Completed)    Urine Microscopic Exam (Completed)    Urine Culture    CT Abdomen Pelvis w/o & w Contrast     Other Visit Diagnoses       Dysuria    -  Primary    Relevant Orders    Wet prep - Clinic Collect (Completed)    UA Macroscopic with reflex to Microscopic and Culture - Clinic Collect (Completed)    Urine Microscopic Exam (Completed)    Urine Culture    Hip pain, right               Sara MARIO Santoyo CNP  [facility] LIANET Rodriguez is a 28 year old, presenting for the following health issues:  Hip Pain (RT side radiates to groin for 2 weeks.) and Urinary Problem (Burning with urination for 2 weeks.)        1/8/2024     8:05 AM   Additional Questions   Roomed by  "sac   Accompanied by self         1/8/2024     8:05 AM   Patient Reported Additional Medications   Patient reports taking the following new medications no     Urinary symptoms:  Present for 2 days  Frequent urination, urgency, burning with urination. Lower abdominal cramping with passing of urine. No blood in the urine that she is aware of, flank pain, no fevers. No vaginal discharge.     Hip pain:  Present for 2 weeks  No known injury or strain  History of low back pain with generally responds to conservative cares such as ibuprofen, rest, ice, chiropractic care.   Pain is present to the right hip with radiation into the right groin   Pain is occasionally sharp, otherwise tends to be dull.  Aggravating factors are hard for patient to identify. Is sometimes worsened by activity/movements but not always.   Alleviating factors include rest (better symptoms in the morning)   No paresthesias, weakness  Has participated in ibuprofen, rest, ice.     History of Present Illness       Reason for visit:  Hip groin pain suspect uti  Symptom onset:  1-2 weeks ago  Symptoms include:  Pain burning  Symptom intensity:  Moderate  Symptom progression:  Improving  Had these symptoms before:  No    She eats 2-3 servings of fruits and vegetables daily.She consumes 0 sweetened beverage(s) daily.She exercises with enough effort to increase her heart rate 9 or less minutes per day.  She exercises with enough effort to increase her heart rate 3 or less days per week.   She is taking medications regularly.     Review of Systems         Objective    /83 (BP Location: Left arm, Patient Position: Sitting, Cuff Size: Adult Large)   Pulse 84   Temp 97.8  F (36.6  C) (Oral)   Resp 16   Ht 1.727 m (5' 8\")   Wt 97.6 kg (215 lb 1 oz)   LMP 12/28/2023 (Exact Date)   SpO2 98%   BMI 32.70 kg/m    Body mass index is 32.7 kg/m .    Physical Exam  Vitals and nursing note reviewed.   Constitutional:       General: She is not in acute " distress.     Appearance: Normal appearance. She is not ill-appearing or diaphoretic.   Cardiovascular:      Rate and Rhythm: Normal rate and regular rhythm.   Pulmonary:      Effort: Pulmonary effort is normal. No respiratory distress.   Abdominal:      General: Abdomen is flat. Bowel sounds are normal.      Palpations: Abdomen is soft.      Tenderness: There is abdominal tenderness in the right upper quadrant and right lower quadrant. There is right CVA tenderness. There is no guarding or rebound.   Neurological:      Mental Status: She is alert.        Results for orders placed or performed in visit on 01/08/24 (from the past 24 hour(s))   Wet prep - Clinic Collect    Specimen: Vagina; Swab   Result Value Ref Range    Trichomonas Absent Absent    Yeast Absent Absent    Clue Cells Absent Absent    WBCs/high power field None None   UA Macroscopic with reflex to Microscopic and Culture - Clinic Collect    Specimen: Urine, Midstream   Result Value Ref Range    Color Urine Yellow Colorless, Straw, Light Yellow, Yellow    Appearance Urine Slightly Cloudy (A) Clear    Glucose Urine Negative Negative mg/dL    Bilirubin Urine Negative Negative    Ketones Urine Negative Negative mg/dL    Specific Gravity Urine <=1.005 1.005 - 1.030    Blood Urine Large (A) Negative    pH Urine 5.5 5.0 - 8.0    Protein Albumin Urine Negative Negative mg/dL    Urobilinogen Urine 0.2 0.2, 1.0 E.U./dL    Nitrite Urine Negative Negative    Leukocyte Esterase Urine Moderate (A) Negative   Urine Microscopic Exam   Result Value Ref Range    Bacteria Urine Moderate (A) None Seen /HPF    RBC Urine 5-10 (A) 0-2 /HPF /HPF    WBC Urine 25-50 (A) 0-5 /HPF /HPF    Squamous Epithelials Urine Few (A) None Seen /LPF   CBC with platelets and differential    Narrative    The following orders were created for panel order CBC with platelets and differential.  Procedure                               Abnormality         Status                     ---------                                -----------         ------                     CBC with platelets and d...[[id number]]  Abnormal            Final result                 Please view results for these tests on the individual orders.   CBC with platelets and differential   Result Value Ref Range    WBC Count 11.7 (H) 4.0 - 11.0 10e3/uL    RBC Count 4.86 3.80 - 5.20 10e6/uL    Hemoglobin 14.1 11.7 - 15.7 g/dL    Hematocrit 42.7 35.0 - 47.0 %    MCV 88 78 - 100 fL    MCH 29.0 26.5 - 33.0 pg    MCHC 33.0 31.5 - 36.5 g/dL    RDW 12.1 10.0 - 15.0 %    Platelet Count 410 150 - 450 10e3/uL    % Neutrophils 68 %    % Lymphocytes 24 %    % Monocytes 6 %    % Eosinophils 1 %    % Basophils 0 %    % Immature Granulocytes 0 %    Absolute Neutrophils 7.9 1.6 - 8.3 10e3/uL    Absolute Lymphocytes 2.8 0.8 - 5.3 10e3/uL    Absolute Monocytes 0.8 0.0 - 1.3 10e3/uL    Absolute Eosinophils 0.1 0.0 - 0.7 10e3/uL    Absolute Basophils 0.1 0.0 - 0.2 10e3/uL    Absolute Immature Granulocytes 0.0 <=0.4 10e3/uL

## 2024-01-08 NOTE — ASSESSMENT & PLAN NOTE
Workup today in clinic significant for mild leukocytosis with a WBC of 11.7, large amount of blood noted to UA, and negative wet prep. Some leukocytes and moderate bacteria on UA, but also epithelial cells so question contamination and will wait for both imaging and culture results as opposed to empirical treatment. We discussed potential etiologies of the pain she is describing including muscular pain of the right hip, appendicitis, ovarian cyst, nephrolithiasis and pyelonephritis. Would like to obtain CT imaging to assess more in depth and this order was placed and will plan to treat as appropriate. She is non toxic appearing in clinic today with stable vital signs, however we discussed that if symptoms change or worsen I would recommend immediate reevaluation. Patient expressed an understanding of and agreement with this plan. All questions were answered.

## 2024-01-09 DIAGNOSIS — M25.551 HIP PAIN, RIGHT: ICD-10-CM

## 2024-01-09 DIAGNOSIS — N30.90 CYSTITIS: Primary | ICD-10-CM

## 2024-01-09 LAB — BACTERIA UR CULT: ABNORMAL

## 2024-01-09 RX ORDER — SULFAMETHOXAZOLE/TRIMETHOPRIM 800-160 MG
1 TABLET ORAL 2 TIMES DAILY
Qty: 6 TABLET | Refills: 0 | Status: SHIPPED | OUTPATIENT
Start: 2024-01-09 | End: 2024-01-12

## 2024-01-09 NOTE — PATIENT INSTRUCTIONS
Urinary Tract Infection (UTI) in Women: Care Instructions  Overview     A urinary tract infection, or UTI, is a general term for an infection anywhere between the kidneys and the urethra (where urine comes out). Most UTIs are bladder infections. They often cause pain or burning when you urinate.  UTIs are caused by bacteria and can be cured with antibiotics. Be sure to complete your treatment so that the infection does not get worse.  Follow-up care is a key part of your treatment and safety. Be sure to make and go to all appointments, and call your doctor if you are having problems. It's also a good idea to know your test results and keep a list of the medicines you take.  How can you care for yourself at home?  Take your antibiotics as directed. Do not stop taking them just because you feel better. You need to take the full course of antibiotics.  Drink extra water and other fluids for the next day or two. This will help make the urine less concentrated and help wash out the bacteria that are causing the infection. (If you have kidney, heart, or liver disease and have to limit fluids, talk with your doctor before you increase the amount of fluids you drink.)  Avoid drinks that are carbonated or have caffeine. They can irritate the bladder.  Urinate often. Try to empty your bladder each time.  To relieve pain, take a hot bath or lay a heating pad set on low over your lower belly or genital area. Never go to sleep with a heating pad in place.  To prevent UTIs  Drink plenty of water each day. This helps you urinate often, which clears bacteria from your system. (If you have kidney, heart, or liver disease and have to limit fluids, talk with your doctor before you increase the amount of fluids you drink.)  Urinate when you need to.  If you are sexually active, urinate right after you have sex.  Change sanitary pads often.  Avoid douches, bubble baths, feminine hygiene sprays, and other feminine hygiene products that  "have deodorants.  After going to the bathroom, wipe from front to back.  When should you call for help?   Call your doctor now or seek immediate medical care if:    Symptoms such as fever, chills, nausea, or vomiting get worse or appear for the first time.     You have new pain in your back just below your rib cage. This is called flank pain.     There is new blood or pus in your urine.     You have any problems with your antibiotic medicine.   Watch closely for changes in your health, and be sure to contact your doctor if:    You are not getting better after taking an antibiotic for 2 days.     Your symptoms go away but then come back.   Where can you learn more?  Go to https://www.[employer].net/patiented  Enter K848 in the search box to learn more about \"Urinary Tract Infection (UTI) in Women: Care Instructions.\"  Current as of: February 28, 2023               Content Version: 13.8    1073-8320 [employer].   Care instructions adapted under license by your healthcare professional. If you have questions about a medical condition or this instruction, always ask your healthcare professional. [employer] disclaims any warranty or liability for your use of this information.      "

## 2024-03-16 ENCOUNTER — MYC MEDICAL ADVICE (OUTPATIENT)
Dept: FAMILY MEDICINE | Facility: CLINIC | Age: 29
End: 2024-03-16
Payer: COMMERCIAL

## 2024-03-16 DIAGNOSIS — Z11.1 SCREENING EXAMINATION FOR PULMONARY TUBERCULOSIS: Primary | ICD-10-CM

## 2024-03-22 ENCOUNTER — LAB (OUTPATIENT)
Dept: LAB | Facility: CLINIC | Age: 29
End: 2024-03-22
Payer: COMMERCIAL

## 2024-03-22 DIAGNOSIS — Z11.1 SCREENING EXAMINATION FOR PULMONARY TUBERCULOSIS: ICD-10-CM

## 2024-03-22 PROCEDURE — 36415 COLL VENOUS BLD VENIPUNCTURE: CPT

## 2024-03-22 PROCEDURE — 86481 TB AG RESPONSE T-CELL SUSP: CPT

## 2024-04-22 PROBLEM — R68.84 JAW PAIN: Status: ACTIVE | Noted: 2024-04-22

## 2024-04-22 PROBLEM — R59.1 LYMPHADENOPATHY: Status: ACTIVE | Noted: 2024-04-22

## 2024-04-23 NOTE — ASSESSMENT & PLAN NOTE
She has been experiencing left sided jaw pain for past several months.  She describes the pain as mild.  The pain is unchanged.  Chewing and opening her mouth wide worsen the pain.  She has used ice, heat and Advil to treat her symptoms.  And the dentist.  She is also noticed a small node in her left cheek.  She does note that she grinds her teeth at night.  She tried an over-the-counter mouthguard but seem to worsen her symptoms.  Denies constitutional symptoms such as fever, weight loss or worsening symptoms.  She is a non-smoker.  No worrisome features on exam today.  It does appear that she may have bit her cheek.    Recommendation at this time is to proceed with Advil 400 mg 3 times a day with food for 1 week.  I encouraged her to set up an appointment with a dentist to evaluate for abscess or cavity although she does not appear to have gingivitis.  Lastly, if the small node continues we will set up for a soft tissue ultrasound for further evaluation.  She was comfortable with this plan of care.    We discussed that she is due for her annual physical in June.  She is also due for her COVID-vaccine which she declined today.

## 2024-04-24 ENCOUNTER — OFFICE VISIT (OUTPATIENT)
Dept: FAMILY MEDICINE | Facility: CLINIC | Age: 29
End: 2024-04-24
Payer: COMMERCIAL

## 2024-04-24 VITALS
BODY MASS INDEX: 32.24 KG/M2 | WEIGHT: 212.7 LBS | SYSTOLIC BLOOD PRESSURE: 130 MMHG | RESPIRATION RATE: 16 BRPM | TEMPERATURE: 98.4 F | HEIGHT: 68 IN | DIASTOLIC BLOOD PRESSURE: 76 MMHG | HEART RATE: 66 BPM | OXYGEN SATURATION: 100 %

## 2024-04-24 DIAGNOSIS — R68.84 JAW PAIN: Primary | ICD-10-CM

## 2024-04-24 DIAGNOSIS — R59.1 LYMPHADENOPATHY: ICD-10-CM

## 2024-04-24 PROBLEM — R10.31 RLQ ABDOMINAL PAIN: Status: RESOLVED | Noted: 2024-01-08 | Resolved: 2024-04-24

## 2024-04-24 NOTE — LETTER
April 24, 2024      Jennifer El  [address] [place] [address]        To Whom It May Concern:    Jennifer El  was seen on 04/24/24 .  Please excuse her from school today as she was seen in clinic.      Sincerely,        Chelita Ortiz PA-C

## 2024-04-24 NOTE — PROGRESS NOTES
"  Assessment & Plan   Problem List Items Addressed This Visit       Jaw pain - Primary     She has been experiencing left sided jaw pain for past several months.  She describes the pain as mild.  The pain is unchanged.  Chewing and opening her mouth wide worsen the pain.  She has used ice, heat and Advil to treat her symptoms.  And the dentist.  She is also noticed a small node in her left cheek.  She does note that she grinds her teeth at night.  She tried an over-the-counter mouthguard but seem to worsen her symptoms.  Denies constitutional symptoms such as fever, weight loss or worsening symptoms.  She is a non-smoker.  No worrisome features on exam today.  It does appear that she may have bit her cheek.    Recommendation at this time is to proceed with Advil 400 mg 3 times a day with food for 1 week.  I encouraged her to set up an appointment with a dentist to evaluate for abscess or cavity although she does not appear to have gingivitis.  Lastly, if the small node continues we will set up for a soft tissue ultrasound for further evaluation.  She was comfortable with this plan of care.    We discussed that she is due for her annual physical in June.  She is also due for her COVID-vaccine which she declined today.         Relevant Orders    US Head Neck Soft Tissue    Lymphadenopathy    Relevant Orders    US Head Neck Soft Tissue       BMI  Estimated body mass index is 32.34 kg/m  as calculated from the following:    Height as of this encounter: 1.727 m (5' 8\").    Weight as of this encounter: 96.5 kg (212 lb 11.2 oz).       Eugenia Rodriguez is a 29 year old, presenting for the following health issues:  jaw pain (Intermittent - years) and Mass (Left side of neck - 2 months)        4/24/2024     7:49 AM   Additional Questions   Roomed by ac   Accompanied by self     She has been experiencing left sided jaw pain for past several months.  She describes the pain as mild.  The pain is unchanged.  Chewing and " "opening her mouth wide worsen the pain.  She has used ice, heat and Advil to treat her symptoms.  And the dentist.  She is also noticed a small node in her left cheek.  She does note that she grinds her teeth at night.  She tried an over-the-counter mouthguard but seem to worsen her symptoms.  Denies constitutional symptoms such as fever, weight loss or worsening symptoms.  She is a non-smoker.  No worrisome features on exam today.  It does appear that she may have bit her cheek.    History of Present Illness       Reason for visit:  Jaw pain and nodule near jaw  Symptom onset:  More than a month  Symptoms include:  Left side primarily  Symptom intensity:  Mild  Symptom progression:  Staying the same  Had these symptoms before:  Yes  Has tried/received treatment for these symptoms:  No  What makes it worse:  Chewing opening jaw wide  What makes it better:  Ice heat advil    She eats 2-3 servings of fruits and vegetables daily.She consumes 0 sweetened beverage(s) daily.She exercises with enough effort to increase her heart rate 9 or less minutes per day.  She exercises with enough effort to increase her heart rate 3 or less days per week.   She is taking medications regularly.        Objective    /76 (BP Location: Left arm, Patient Position: Sitting, Cuff Size: Adult Regular)   Pulse 66   Temp 98.4  F (36.9  C) (Oral)   Resp 16   Ht 1.727 m (5' 8\")   Wt 96.5 kg (212 lb 11.2 oz)   LMP 04/10/2024 (Exact Date)   SpO2 100%   BMI 32.34 kg/m    Body mass index is 32.34 kg/m .  Physical Exam  Constitutional:       Appearance: Normal appearance.   HENT:      Head: Normocephalic.      Right Ear: Tympanic membrane, ear canal and external ear normal.      Left Ear: Tympanic membrane, ear canal and external ear normal.      Mouth/Throat:      Mouth: Mucous membranes are moist.      Comments: Noted to have a click when she opens and closes her mouth bilaterally.  She has a small 0.5 cm movable node in your left " cheek approximately 1-1/2 inches preauricular.    To have an area in the left buccal mucosa so she has bit her cheek.  No gingivitis noted.  Teeth appear in good repair.  Neurological:      Mental Status: She is alert.        Signed Electronically by: Chelita Ortiz PA-C

## 2024-04-24 NOTE — PATIENT INSTRUCTIONS
Take advil 400 mg three times a day for one week with food.  Go to dentist to determine if grinding.  Trial mouth guard.  If symptoms persist, will proceed with ultrasound of soft tissue.    Set up physical in June.

## 2024-05-06 ENCOUNTER — PATIENT OUTREACH (OUTPATIENT)
Dept: CARE COORDINATION | Facility: CLINIC | Age: 29
End: 2024-05-06
Payer: COMMERCIAL

## 2024-05-07 ENCOUNTER — E-VISIT (OUTPATIENT)
Dept: URGENT CARE | Facility: CLINIC | Age: 29
End: 2024-05-07
Payer: COMMERCIAL

## 2024-05-07 DIAGNOSIS — R19.7 DIARRHEA, UNSPECIFIED TYPE: Primary | ICD-10-CM

## 2024-05-07 PROCEDURE — 99207 PR NON-BILLABLE SERV PER CHARTING: CPT | Performed by: EMERGENCY MEDICINE

## 2024-05-07 NOTE — LETTER
May 7, 2024      Jennifer El  [address] [place] [address]        To Whom It May Concern:    Jennifer El  was seen on 5/7/24.  Please excuse her  until 5/9/24, due to illness.  May return sooner if better.      Sincerely,        Samy Gomez MD

## 2024-05-07 NOTE — PATIENT INSTRUCTIONS
Dear Jennifer El,    We are sorry you are not feeling well. Based on the responses you provided, it is recommended that you be seen in-person in urgent care so we can better evaluate your symptoms. Please click here to find the nearest urgent care location to you.   You will not be charged for this Visit. Thank you for trusting us with your care.    Samy Gomez MD    Diarrhea: Care Instructions  Overview     Diarrhea is loose, watery stools (bowel movements). The exact cause is often hard to find. Sometimes diarrhea is your body's way of getting rid of what caused an upset stomach. Viruses, food poisoning, and many medicines can cause diarrhea. Some people get diarrhea in response to emotional stress, anxiety, or certain foods.  Almost everyone has diarrhea now and then. It usually isn't serious, and your stools will return to normal soon. The important thing to do is replace the fluids you have lost, so you can prevent dehydration.  The doctor has checked you carefully, but problems can develop later. If you notice any problems or new symptoms, get medical treatment right away.  Follow-up care is a key part of your treatment and safety. Be sure to make and go to all appointments, and call your doctor if you are having problems. It's also a good idea to know your test results and keep a list of the medicines you take.  How can you care for yourself at home?  Watch for signs of dehydration, which means your body has lost too much water. Dehydration is a serious condition and should be treated right away. Signs of dehydration are:  Increasing thirst and dry eyes and mouth.  Feeling faint or lightheaded.  A smaller amount of urine than normal.  To prevent dehydration, drink plenty of fluids. Choose water and other clear liquids until you feel better. If you have kidney, heart, or liver disease and have to limit fluids, talk with your doctor before you increase the amount of fluids you drink.  When you feel  "like eating, start with small amounts of food.  The doctor may recommend that you take over-the-counter medicine, such as loperamide (Imodium). Read and follow all instructions on the label. Do not use this medicine if you have bloody diarrhea, a high fever, or other signs of serious illness. Call your doctor if you think you are having a problem with your medicine.  When should you call for help?   Call 911 anytime you think you may need emergency care. For example, call if:    You passed out (lost consciousness).     Your stools are maroon or very bloody.   Call your doctor now or seek immediate medical care if:    You are dizzy or lightheaded, or you feel like you may faint.     Your stools are black and look like tar, or they have streaks of blood.     You have new or worse belly pain.     You have symptoms of dehydration, such as:  Dry eyes and a dry mouth.  Passing only a little urine.  Cannot keep fluids down.     You have a new or higher fever.   Watch closely for changes in your health, and be sure to contact your doctor if:    Your diarrhea is getting worse.     You see pus in the diarrhea.     You are not getting better after 2 days (48 hours).   Where can you learn more?  Go to https://www.[employer].net/patiented  Enter W335 in the search box to learn more about \"Diarrhea: Care Instructions.\"  Current as of: October 19, 2023               Content Version: 14.0    7762-9016 [employer].   Care instructions adapted under license by your healthcare professional. If you have questions about a medical condition or this instruction, always ask your healthcare professional. [employer] disclaims any warranty or liability for your use of this information.      "

## 2024-05-20 ENCOUNTER — PATIENT OUTREACH (OUTPATIENT)
Dept: CARE COORDINATION | Facility: CLINIC | Age: 29
End: 2024-05-20
Payer: COMMERCIAL

## 2024-05-20 ENCOUNTER — HOSPITAL ENCOUNTER (OUTPATIENT)
Dept: ULTRASOUND IMAGING | Facility: HOSPITAL | Age: 29
Discharge: HOME | End: 2024-05-20
Attending: PHYSICIAN ASSISTANT | Admitting: PHYSICIAN ASSISTANT
Payer: COMMERCIAL

## 2024-05-20 DIAGNOSIS — R59.1 LYMPHADENOPATHY: ICD-10-CM

## 2024-05-20 DIAGNOSIS — R68.84 JAW PAIN: ICD-10-CM

## 2024-05-20 PROCEDURE — 76536 US EXAM OF HEAD AND NECK: CPT

## 2024-06-18 ENCOUNTER — MEDICAL CORRESPONDENCE (OUTPATIENT)
Dept: HEALTH INFORMATION MANAGEMENT | Facility: CLINIC | Age: 29
End: 2024-06-18
Payer: COMMERCIAL

## 2024-07-02 ENCOUNTER — TRANSFERRED RECORDS (OUTPATIENT)
Dept: HEALTH INFORMATION MANAGEMENT | Facility: CLINIC | Age: 29
End: 2024-07-02
Payer: COMMERCIAL

## 2024-07-03 ENCOUNTER — TRANSCRIBE ORDERS (OUTPATIENT)
Dept: MATERNAL FETAL MEDICINE | Facility: CLINIC | Age: 29
End: 2024-07-03
Payer: COMMERCIAL

## 2024-07-03 ENCOUNTER — MEDICAL CORRESPONDENCE (OUTPATIENT)
Dept: HEALTH INFORMATION MANAGEMENT | Facility: CLINIC | Age: 29
End: 2024-07-03
Payer: COMMERCIAL

## 2024-07-03 DIAGNOSIS — O26.90 PREGNANCY RELATED CONDITION: Primary | ICD-10-CM

## 2024-07-03 DIAGNOSIS — O09.299 CURRENT SINGLETON PREGNANCY WITH HISTORY OF CONGENITAL HEART DISEASE IN PRIOR CHILD, ANTEPARTUM: Primary | ICD-10-CM

## 2024-07-14 ENCOUNTER — HEALTH MAINTENANCE LETTER (OUTPATIENT)
Age: 29
End: 2024-07-14

## 2024-07-31 ENCOUNTER — PRE VISIT (OUTPATIENT)
Dept: MATERNAL FETAL MEDICINE | Facility: CLINIC | Age: 29
End: 2024-07-31
Payer: COMMERCIAL

## 2024-08-02 ENCOUNTER — OFFICE VISIT (OUTPATIENT)
Dept: CARDIOLOGY | Facility: CLINIC | Age: 29
End: 2024-08-02

## 2024-08-02 ENCOUNTER — OFFICE VISIT (OUTPATIENT)
Dept: MATERNAL FETAL MEDICINE | Facility: CLINIC | Age: 29
End: 2024-08-02
Attending: OBSTETRICS & GYNECOLOGY
Payer: COMMERCIAL

## 2024-08-02 ENCOUNTER — HOSPITAL ENCOUNTER (OUTPATIENT)
Dept: ULTRASOUND IMAGING | Facility: CLINIC | Age: 29
Discharge: HOME | End: 2024-08-02
Attending: OBSTETRICS & GYNECOLOGY
Payer: COMMERCIAL

## 2024-08-02 ENCOUNTER — HOSPITAL ENCOUNTER (OUTPATIENT)
Dept: CARDIOLOGY | Facility: CLINIC | Age: 29
Discharge: HOME | End: 2024-08-02
Attending: OBSTETRICS & GYNECOLOGY
Payer: COMMERCIAL

## 2024-08-02 ENCOUNTER — TELEPHONE (OUTPATIENT)
Dept: CARDIOLOGY | Facility: CLINIC | Age: 29
End: 2024-08-02

## 2024-08-02 DIAGNOSIS — Z82.79 FAMILY HISTORY OF CONGENITAL HEART DEFECT: Primary | ICD-10-CM

## 2024-08-02 DIAGNOSIS — O35.8XX0 FAMILY OR MATERNAL HISTORIC RISK OF CONGENITAL ANOMALY, ANTEPARTUM, SINGLE OR UNSPECIFIED FETUS: Primary | ICD-10-CM

## 2024-08-02 DIAGNOSIS — O09.299 CURRENT SINGLETON PREGNANCY WITH HISTORY OF CONGENITAL HEART DISEASE IN PRIOR CHILD, ANTEPARTUM: ICD-10-CM

## 2024-08-02 DIAGNOSIS — O35.BXX0 FETAL CARDIAC DISEASE AFFECTING PREGNANCY, SINGLE OR UNSPECIFIED FETUS: Primary | ICD-10-CM

## 2024-08-02 PROCEDURE — 76827 ECHO EXAM OF FETAL HEART: CPT

## 2024-08-02 PROCEDURE — 99213 OFFICE O/P EST LOW 20 MIN: CPT | Mod: 25 | Performed by: PEDIATRICS

## 2024-08-02 PROCEDURE — 93325 DOPPLER ECHO COLOR FLOW MAPG: CPT

## 2024-08-02 PROCEDURE — 76825 ECHO EXAM OF FETAL HEART: CPT | Mod: 26 | Performed by: PEDIATRICS

## 2024-08-02 PROCEDURE — 76827 ECHO EXAM OF FETAL HEART: CPT | Mod: 26 | Performed by: PEDIATRICS

## 2024-08-02 PROCEDURE — 93325 DOPPLER ECHO COLOR FLOW MAPG: CPT | Mod: 26 | Performed by: PEDIATRICS

## 2024-08-02 PROCEDURE — 76805 OB US >/= 14 WKS SNGL FETUS: CPT | Mod: 26 | Performed by: OBSTETRICS & GYNECOLOGY

## 2024-08-02 PROCEDURE — 76805 OB US >/= 14 WKS SNGL FETUS: CPT

## 2024-08-02 NOTE — PROGRESS NOTES
[facility]   Heart Center Fetal Consult Note    Patient:  Jennifer El MRN:  [id number]   YOB: 1995 Age:  29 year old   Date of Visit:  2024 PCP:  Sara Mc APRN CNP     Dear Doctor,     I had the pleasure of seeing Jennifer El at the [facility] on 2024 in fetal cardiology consultation for fetal echocardiogram results. She presented today accompanied by her . As you know, she is a 29 year old  at 15w3d who presented for fetal echocardiogram today because of previous child with hypoplastic left heart syndrome.    I performed and interpreted the fetal echocardiogram today, which demonstrated technically difficult due to early gestational age. Normal right and left ventricular size and systolic function. Normally related great arteries. No pericardial effusion.     I reviewed the echo findings today with Jennifer El and her partner. Although this was a technically difficult study, the patient is aware that no obvious cardiac abnormalities were identified. She is aware of the general limitations of fetal echocardiography. A follow-up fetal echocardiogram is recommended at 21 weeks gestation.     Thank you for allowing me to participate in Jennifer's care. Please do not hesitate to contact me with questions or concerns.    This visit was separate from the performance and interpretation of the ultrasound. The majority of the time (>50%) was spent in counseling and coordination of care. I spent approximately 20 minutes in face-to-face time reviewing the above considerations.    Fernando Matson M.D.  Pediatric Cardiology  [address], 5th floor, [facility] [address]  Phone [phone]  Fax [phone]

## 2024-08-02 NOTE — TELEPHONE ENCOUNTER
Asked that I speak with Ely to see if she could schedule with Axel instead of Kavisha and on 9/10/24 instead of 9/6.

## 2024-08-05 ENCOUNTER — TELEPHONE (OUTPATIENT)
Dept: CARDIOLOGY | Facility: CLINIC | Age: 29
End: 2024-08-05
Payer: COMMERCIAL

## 2024-08-13 ENCOUNTER — TRANSFERRED RECORDS (OUTPATIENT)
Dept: HEALTH INFORMATION MANAGEMENT | Facility: CLINIC | Age: 29
End: 2024-08-13
Payer: COMMERCIAL

## 2024-08-26 ENCOUNTER — HOSPITAL ENCOUNTER (OUTPATIENT)
Dept: ULTRASOUND IMAGING | Facility: CLINIC | Age: 29
Discharge: HOME | End: 2024-08-26
Attending: STUDENT IN AN ORGANIZED HEALTH CARE EDUCATION/TRAINING PROGRAM
Payer: COMMERCIAL

## 2024-08-26 ENCOUNTER — OFFICE VISIT (OUTPATIENT)
Dept: MATERNAL FETAL MEDICINE | Facility: CLINIC | Age: 29
End: 2024-08-26
Attending: STUDENT IN AN ORGANIZED HEALTH CARE EDUCATION/TRAINING PROGRAM
Payer: COMMERCIAL

## 2024-08-26 DIAGNOSIS — O09.299 CURRENT SINGLETON PREGNANCY WITH HISTORY OF CONGENITAL HEART DISEASE IN PRIOR CHILD, ANTEPARTUM: Primary | ICD-10-CM

## 2024-08-26 DIAGNOSIS — O09.299 CURRENT SINGLETON PREGNANCY WITH HISTORY OF CONGENITAL HEART DISEASE IN PRIOR CHILD, ANTEPARTUM: ICD-10-CM

## 2024-08-26 PROCEDURE — 76811 OB US DETAILED SNGL FETUS: CPT

## 2024-08-26 PROCEDURE — 76811 OB US DETAILED SNGL FETUS: CPT | Mod: 26 | Performed by: OBSTETRICS & GYNECOLOGY

## 2024-09-11 ENCOUNTER — TELEPHONE (OUTPATIENT)
Dept: CARDIOLOGY | Facility: CLINIC | Age: 29
End: 2024-09-11
Payer: COMMERCIAL

## 2024-09-18 ENCOUNTER — IMMUNIZATION (OUTPATIENT)
Dept: FAMILY MEDICINE | Facility: CLINIC | Age: 29
End: 2024-09-18
Payer: COMMERCIAL

## 2024-09-18 PROCEDURE — 90656 IIV3 VACC NO PRSV 0.5 ML IM: CPT

## 2024-09-18 PROCEDURE — 90471 IMMUNIZATION ADMIN: CPT

## 2024-10-02 ENCOUNTER — HOSPITAL ENCOUNTER (OUTPATIENT)
Dept: CARDIOLOGY | Facility: CLINIC | Age: 29
Discharge: HOME | End: 2024-10-02
Attending: OBSTETRICS & GYNECOLOGY | Admitting: OBSTETRICS & GYNECOLOGY
Payer: COMMERCIAL

## 2024-10-02 DIAGNOSIS — Z82.79 FAMILY HISTORY OF CONGENITAL HEART DEFECT: ICD-10-CM

## 2024-10-02 PROCEDURE — 93325 DOPPLER ECHO COLOR FLOW MAPG: CPT

## 2024-10-02 PROCEDURE — 76825 ECHO EXAM OF FETAL HEART: CPT | Mod: 26 | Performed by: PEDIATRICS

## 2024-10-02 PROCEDURE — 93325 DOPPLER ECHO COLOR FLOW MAPG: CPT | Mod: 26 | Performed by: PEDIATRICS

## 2024-10-02 PROCEDURE — 76827 ECHO EXAM OF FETAL HEART: CPT | Mod: 26 | Performed by: PEDIATRICS

## 2024-11-06 ASSESSMENT — ANXIETY QUESTIONNAIRES: GAD7 TOTAL SCORE: 5

## 2025-02-06 ENCOUNTER — MEDICAL CORRESPONDENCE (OUTPATIENT)
Dept: HEALTH INFORMATION MANAGEMENT | Facility: CLINIC | Age: 30
End: 2025-02-06
Payer: COMMERCIAL

## 2025-04-01 ENCOUNTER — OFFICE VISIT (OUTPATIENT)
Dept: FAMILY MEDICINE | Facility: CLINIC | Age: 30
End: 2025-04-01
Payer: COMMERCIAL

## 2025-04-01 VITALS
HEART RATE: 64 BPM | RESPIRATION RATE: 16 BRPM | SYSTOLIC BLOOD PRESSURE: 122 MMHG | DIASTOLIC BLOOD PRESSURE: 85 MMHG | HEIGHT: 68 IN | WEIGHT: 245 LBS | BODY MASS INDEX: 37.13 KG/M2 | OXYGEN SATURATION: 97 % | TEMPERATURE: 98.5 F

## 2025-04-01 DIAGNOSIS — R41.89 BRAIN FOG: ICD-10-CM

## 2025-04-01 DIAGNOSIS — R00.2 PALPITATIONS: Primary | ICD-10-CM

## 2025-04-01 LAB
ALBUMIN SERPL BCG-MCNC: 4.1 G/DL (ref 3.5–5.2)
ALP SERPL-CCNC: 92 U/L (ref 40–150)
ALT SERPL W P-5'-P-CCNC: 122 U/L (ref 0–50)
ANION GAP SERPL CALCULATED.3IONS-SCNC: 11 MMOL/L (ref 7–15)
AST SERPL W P-5'-P-CCNC: 49 U/L (ref 0–45)
ATRIAL RATE - MUSE: 67 BPM
BILIRUB SERPL-MCNC: 0.4 MG/DL
BUN SERPL-MCNC: 13.6 MG/DL (ref 6–20)
CALCIUM SERPL-MCNC: 9.7 MG/DL (ref 8.8–10.4)
CHLORIDE SERPL-SCNC: 107 MMOL/L (ref 98–107)
CREAT SERPL-MCNC: 0.61 MG/DL (ref 0.51–0.95)
DIASTOLIC BLOOD PRESSURE - MUSE: 85 MMHG
EGFRCR SERPLBLD CKD-EPI 2021: >90 ML/MIN/1.73M2
ERYTHROCYTE [DISTWIDTH] IN BLOOD BY AUTOMATED COUNT: 11.9 % (ref 10–15)
GLUCOSE SERPL-MCNC: 97 MG/DL (ref 70–99)
HCO3 SERPL-SCNC: 23 MMOL/L (ref 22–29)
HCT VFR BLD AUTO: 39.8 % (ref 35–47)
HGB BLD-MCNC: 13.3 G/DL (ref 11.7–15.7)
HOLD SPECIMEN: NORMAL
INTERPRETATION ECG - MUSE: NORMAL
MAGNESIUM SERPL-MCNC: 2.1 MG/DL (ref 1.7–2.3)
MCH RBC QN AUTO: 28.9 PG (ref 26.5–33)
MCHC RBC AUTO-ENTMCNC: 33.4 G/DL (ref 31.5–36.5)
MCV RBC AUTO: 86 FL (ref 78–100)
P AXIS - MUSE: 56 DEGREES
PLATELET # BLD AUTO: 390 10E3/UL (ref 150–450)
POTASSIUM SERPL-SCNC: 4.6 MMOL/L (ref 3.4–5.3)
PR INTERVAL - MUSE: 156 MS
PROT SERPL-MCNC: 7.4 G/DL (ref 6.4–8.3)
QRS DURATION - MUSE: 90 MS
QT - MUSE: 412 MS
QTC - MUSE: 435 MS
R AXIS - MUSE: 44 DEGREES
RBC # BLD AUTO: 4.61 10E6/UL (ref 3.8–5.2)
SODIUM SERPL-SCNC: 141 MMOL/L (ref 135–145)
SYSTOLIC BLOOD PRESSURE - MUSE: 122 MMHG
T AXIS - MUSE: 31 DEGREES
T4 FREE SERPL-MCNC: 2.24 NG/DL (ref 0.9–1.7)
TSH SERPL DL<=0.005 MIU/L-ACNC: 0.01 UIU/ML (ref 0.3–4.2)
VENTRICULAR RATE- MUSE: 67 BPM
VIT B12 SERPL-MCNC: 427 PG/ML (ref 232–1245)
VIT D+METAB SERPL-MCNC: 56 NG/ML (ref 20–50)
WBC # BLD AUTO: 7 10E3/UL (ref 4–11)

## 2025-04-01 PROCEDURE — 80053 COMPREHEN METABOLIC PANEL: CPT

## 2025-04-01 PROCEDURE — 83735 ASSAY OF MAGNESIUM: CPT

## 2025-04-01 PROCEDURE — 99214 OFFICE O/P EST MOD 30 MIN: CPT

## 2025-04-01 PROCEDURE — 82607 VITAMIN B-12: CPT

## 2025-04-01 PROCEDURE — 85027 COMPLETE CBC AUTOMATED: CPT

## 2025-04-01 PROCEDURE — 36415 COLL VENOUS BLD VENIPUNCTURE: CPT

## 2025-04-01 PROCEDURE — 82306 VITAMIN D 25 HYDROXY: CPT

## 2025-04-01 PROCEDURE — 3079F DIAST BP 80-89 MM HG: CPT

## 2025-04-01 PROCEDURE — 84443 ASSAY THYROID STIM HORMONE: CPT

## 2025-04-01 PROCEDURE — 84439 ASSAY OF FREE THYROXINE: CPT

## 2025-04-01 PROCEDURE — 93005 ELECTROCARDIOGRAM TRACING: CPT

## 2025-04-01 PROCEDURE — 93010 ELECTROCARDIOGRAM REPORT: CPT | Performed by: INTERNAL MEDICINE

## 2025-04-01 PROCEDURE — 3074F SYST BP LT 130 MM HG: CPT

## 2025-04-01 ASSESSMENT — ENCOUNTER SYMPTOMS: FATIGUE: 1

## 2025-04-01 NOTE — PROGRESS NOTES
Assessment & Plan   Assessment & Plan  Palpitations  Patient presents today to discuss approximately 4 to 6 weeks worth of a constellation of symptoms, including relatively frequent heart palpitations, chest wall pain and brain fog with fatigue.  Overall, the severity of the symptoms have actually been improving in the last few weeks, however are still present.  She denies any associated symptoms of shortness of breath, systemic symptoms like fever or chills or any exertional chest pain/symptoms.  Of note, she is approximately 12 weeks postpartum with her second child and reports that sleep has been a significant challenge.  We discussed that it is a very possible that her constellation of symptoms are result of poor sleep and exhaustion secondary to taking care of 2 small children at home.  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  However, we will expand our workup to include labs and EKG today in clinic to assess for any underlying etiologies including but not limited to anemia or thyroid dysfunction.  We discussed that because specifically the palpitations are improving with improved sleep and were associated with increased caffeine consumption, it would be reasonable to continue monitoring at this time if her workup is negative, however we did discuss the utility of a 7-day outpatient monitor if symptoms are persistent or worsening in any way.  We discussed that assuming a benign workup, would recommend optimizing sleep as able, reducing caffeine and increasing other fluids as well as any stress management techniques as she is able.  Discussed reasons to return to care.  Patient expressed understanding of and agreement with this plan.  All questions were answered.  Orders:    TSH with free T4 reflex; Future    CBC with Platelets and Reflex to Iron Studies; Future    Vitamin  "D Deficiency; Future    Vitamin B12; Future    Comprehensive metabolic panel (BMP + Alb, Alk Phos, ALT, AST, Total. Bili, TP); Future    Magnesium; Future    EKG 12-lead, tracing only    Brain fog    Orders:    TSH with free T4 reflex; Future    CBC with Platelets and Reflex to Iron Studies; Future    Vitamin D Deficiency; Future    Vitamin B12; Future    Comprehensive metabolic panel (BMP + Alb, Alk Phos, ALT, AST, Total. Bili, TP); Future    Magnesium; Future    Subjective   Jennifer is a 30 year old, presenting for the following health issues:  Fatigue (Brain fog, palpitations. Delivered 12 weeks ago and has not been getting much sleep.)        4/1/2025    10:29 AM   Additional Questions   Roomed by sac   Accompanied by self         4/1/2025    10:29 AM   Patient Reported Additional Medications   Patient reports taking the following new medications no     OV to discuss brain fog and palpitations  12 weeks postpartum. These symptoms started about 4-6 weeks after delivery.  \"I just don't remember feeling this crappy\" after the birth of her first child  Brain fog and fatigue are most days. It's getting slightly better now that baby is sleeping longer. Also improves with distraction. \"I'm processing things slower.\"  Palpitations initially were quite frequent (daily). \"Felt like my heart was in my throat.\" Frequency has decreased since making the appointment; still present but more noticeable when she lies down. She has some chest wall pain that she attributes to the fact she is breast feeding and breasts feel heavier  No shortness of breath. No fevers or chills.  Pregnancy complicated by pre-eclampsia and Influenza A.    History of Present Illness       Reason for visit:  Brain fog  Symptom onset:  More than a month  Symptoms include:  Brain fog fatiigue palpatations  Symptom intensity:  Moderate  Symptom progression:  Improving  Had these symptoms before:  No  What makes it worse:  Lack sleep coffee  What makes it " "better:  Sleep nutrition hydration   She is taking medications regularly.          Objective    /85 (BP Location: Left arm, Patient Position: Sitting, Cuff Size: Adult Large)   Pulse 64   Temp 98.5  F (36.9  C) (Oral)   Resp 16   Ht 1.727 m (5' 8\")   Wt 111.1 kg (245 lb)   LMP 04/10/2024 (Exact Date)   SpO2 97%   Breastfeeding Yes   BMI 37.25 kg/m    Body mass index is 37.25 kg/m .    Physical Exam  Vitals and nursing note reviewed.   Constitutional:       General: She is not in acute distress.     Appearance: Normal appearance.   Cardiovascular:      Rate and Rhythm: Normal rate and regular rhythm.      Heart sounds: Normal heart sounds.   Pulmonary:      Effort: Pulmonary effort is normal. No respiratory distress.   Skin:     General: Skin is warm.      Coloration: Skin is not pale.      Findings: No rash.   Neurological:      General: No focal deficit present.      Mental Status: She is alert.      Motor: No weakness.      Coordination: Coordination normal.      Gait: Gait normal.   Psychiatric:         Mood and Affect: Mood normal.         Behavior: Behavior normal.         Thought Content: Thought content normal.        Results for orders placed or performed in visit on 04/01/25 (from the past 24 hours)   EKG 12-lead, tracing only   Result Value Ref Range    Systolic Blood Pressure 122 mmHg    Diastolic Blood Pressure 85 mmHg    Ventricular Rate 67 BPM    Atrial Rate 67 BPM    DE Interval 156 ms    QRS Duration 90 ms     ms    QTc 435 ms    P Axis 56 degrees    R AXIS 44 degrees    T Axis 31 degrees    Interpretation ECG       Sinus rhythm  Normal ECG  No previous ECGs available     CBC with Platelets and Reflex to Iron Studies    Narrative    The following orders were created for panel order CBC with Platelets and Reflex to Iron Studies.  Procedure                               Abnormality         Status                     ---------                               -----------         ------ "                     CBC with Platelets and ...[[id number]]                      In process                 Extra Green Top (Lithiu...[[id number]]                      In process                   Please view results for these tests on the individual orders.           Signed Electronically by: MARIO Pierce CNP

## 2025-04-02 DIAGNOSIS — R79.89 ELEVATED SERUM FREE T4 LEVEL: ICD-10-CM

## 2025-04-02 DIAGNOSIS — R79.89 LOW SERUM THYROID STIMULATING HORMONE (TSH): Primary | ICD-10-CM

## 2025-04-04 ENCOUNTER — MYC MEDICAL ADVICE (OUTPATIENT)
Dept: FAMILY MEDICINE | Facility: CLINIC | Age: 30
End: 2025-04-04
Payer: COMMERCIAL

## 2025-04-04 DIAGNOSIS — R94.6 THYROID FUNCTION TEST ABNORMAL: Primary | ICD-10-CM

## 2025-04-04 DIAGNOSIS — E05.90: ICD-10-CM

## 2025-04-04 PROCEDURE — 99207 E-CONSULT TO ENDOCRINOLOGY (ADULT OUTPT PROVIDER TO SPECIALIST WRITTEN QUESTION & RESPONSE): CPT

## 2025-04-15 ENCOUNTER — E-CONSULT (OUTPATIENT)
Dept: ENDOCRINOLOGY | Facility: CLINIC | Age: 30
End: 2025-04-15
Payer: COMMERCIAL

## 2025-04-15 NOTE — PROGRESS NOTES
4/15/2025     E-Consult has been accepted.    Interprofessional consultation requested by:  Sara Mc APRN CNP      Clinical Question/Purpose: MY CLINICAL QUESTION IS: Patient is a 30-year-old female with no significant thyroid history who presented to clinic with complaints of fatigue, brain fog and intermittent palpitations. Of note she is adopted so family history is unknown.  At the time of our visit, she was approximately 12 weeks postpartum and had noted that with slightly improved sleep and caffeine, some symptoms were mildly improving.  Labs drawn in clinic suggestive of abnormal thyroid function with TSH less than 0.01, free t4 2.24, total t3 204 and very mild elevation in ESR. Thyroid stimulating immunoglobulin is pending (lab in [place] unsure when they can provide results hence the delayed e-consultation). Suspect postpartum thyroiditis? Looking for any additional workup recommendations or is this someone who just needs to be monitored and if so, how frequently? An update as of yesterday was that symptoms were similar, not improving significantly.     Patient assessment and information reviewed:   6/5/23 TSH 3.35  1/1/25 vaginal delivery   4/1/25 weight 245, HR 64/minute; TSH 0.01, free T4 2.24  4/4/25 T3 204 , ESR 25 , CRP 4.85 mg/L , TSI pending         Recommendations:   TSI is pending . Let me know if it is high.  Suspect postpartum thyroiditis? Could be   Looking for any additional workup recommendations or is this someone who just needs to be monitored and if so, how frequently?  Labs now: Free T4, TSH, total T3 and monthly for a while .  If this is transient thyroiditis the labs will go from thyrotoxicosis to euthyroidism, possibly to hypothyroid and then back to euthyroid over a period of weeks to months.      The recommendations provided in this E-Consult are based on a review of clinical data pertinent to the clinical question presented, without a review of the patient's complete  medical record or, the benefit of a comprehensive in-person or virtual patient evaluation. This consultation should not replace the clinical judgement and evaluation of the provider ordering this E-Consult. Any new clinical issues, or changes in patient status since the filing of this E-Consult will need to be taken into account when assessing these recommendations. Please contact me if you have further questions.    My total time spent reviewing clinical information and formulating assessment was 10 minutes.        Marilu Hernandez MD

## 2025-05-03 ENCOUNTER — LAB (OUTPATIENT)
Dept: LAB | Facility: CLINIC | Age: 30
End: 2025-05-03
Payer: COMMERCIAL

## 2025-05-03 DIAGNOSIS — E05.90: ICD-10-CM

## 2025-05-03 LAB
T3 SERPL-MCNC: 159 NG/DL (ref 85–202)
T4 FREE SERPL-MCNC: 1.45 NG/DL (ref 0.9–1.7)
TSH SERPL DL<=0.005 MIU/L-ACNC: <0.01 UIU/ML (ref 0.3–4.2)

## 2025-05-03 PROCEDURE — 84480 ASSAY TRIIODOTHYRONINE (T3): CPT

## 2025-05-03 PROCEDURE — 84443 ASSAY THYROID STIM HORMONE: CPT

## 2025-05-03 PROCEDURE — 84439 ASSAY OF FREE THYROXINE: CPT

## 2025-05-03 PROCEDURE — 36415 COLL VENOUS BLD VENIPUNCTURE: CPT

## 2025-06-05 ENCOUNTER — LAB (OUTPATIENT)
Dept: LAB | Facility: CLINIC | Age: 30
End: 2025-06-05
Payer: COMMERCIAL

## 2025-06-05 DIAGNOSIS — E05.90: ICD-10-CM

## 2025-06-05 LAB
T3 SERPL-MCNC: 114 NG/DL (ref 85–202)
T4 FREE SERPL-MCNC: 0.74 NG/DL (ref 0.9–1.7)
TSH SERPL DL<=0.005 MIU/L-ACNC: 5.82 UIU/ML (ref 0.3–4.2)

## 2025-06-06 ENCOUNTER — RESULTS FOLLOW-UP (OUTPATIENT)
Dept: FAMILY MEDICINE | Facility: CLINIC | Age: 30
End: 2025-06-06
Payer: COMMERCIAL

## 2025-06-09 NOTE — TELEPHONE ENCOUNTER
Left message to call back for: Patient  Information to relay to patient: Please assist with scheduling visit. Okay to use reserved slots, okay for virtual

## 2025-07-02 ENCOUNTER — LAB (OUTPATIENT)
Dept: LAB | Facility: CLINIC | Age: 30
End: 2025-07-02
Payer: COMMERCIAL

## 2025-07-02 DIAGNOSIS — E05.90: ICD-10-CM

## 2025-07-02 LAB
T3 SERPL-MCNC: 113 NG/DL (ref 85–202)
T4 FREE SERPL-MCNC: 0.73 NG/DL (ref 0.9–1.7)
TSH SERPL DL<=0.005 MIU/L-ACNC: 4.2 UIU/ML (ref 0.3–4.2)

## 2025-07-02 PROCEDURE — 36415 COLL VENOUS BLD VENIPUNCTURE: CPT

## 2025-07-02 PROCEDURE — 84443 ASSAY THYROID STIM HORMONE: CPT

## 2025-07-02 PROCEDURE — 84439 ASSAY OF FREE THYROXINE: CPT

## 2025-07-02 PROCEDURE — 84480 ASSAY TRIIODOTHYRONINE (T3): CPT

## 2025-07-03 ENCOUNTER — RESULTS FOLLOW-UP (OUTPATIENT)
Dept: FAMILY MEDICINE | Facility: CLINIC | Age: 30
End: 2025-07-03

## 2025-07-19 ENCOUNTER — HEALTH MAINTENANCE LETTER (OUTPATIENT)
Age: 30
End: 2025-07-19

## 2025-08-11 ENCOUNTER — TELEPHONE (OUTPATIENT)
Dept: FAMILY MEDICINE | Facility: CLINIC | Age: 30
End: 2025-08-11
Payer: COMMERCIAL

## 2025-08-11 DIAGNOSIS — R94.6 THYROID FUNCTION TEST ABNORMAL: ICD-10-CM

## 2025-08-11 DIAGNOSIS — E05.90: Primary | ICD-10-CM

## 2025-08-12 ENCOUNTER — LAB (OUTPATIENT)
Dept: LAB | Facility: CLINIC | Age: 30
End: 2025-08-12
Payer: COMMERCIAL

## 2025-08-12 DIAGNOSIS — E05.90: ICD-10-CM

## 2025-08-12 DIAGNOSIS — R94.6 THYROID FUNCTION TEST ABNORMAL: ICD-10-CM

## 2025-08-12 LAB
T3 SERPL-MCNC: 126 NG/DL (ref 85–202)
T4 FREE SERPL-MCNC: 0.94 NG/DL (ref 0.9–1.7)
TSH SERPL DL<=0.005 MIU/L-ACNC: 2.15 UIU/ML (ref 0.3–4.2)

## 2025-08-12 PROCEDURE — 84480 ASSAY TRIIODOTHYRONINE (T3): CPT

## 2025-08-12 PROCEDURE — 84443 ASSAY THYROID STIM HORMONE: CPT

## 2025-08-12 PROCEDURE — 36415 COLL VENOUS BLD VENIPUNCTURE: CPT

## 2025-08-12 PROCEDURE — 84439 ASSAY OF FREE THYROXINE: CPT

## (undated) DEVICE — DRSG ABDOMINAL 07 1/2X8" 7197D

## (undated) DEVICE — SOL ADH LIQUID BENZOIN SWAB 0.6ML C1544

## (undated) DEVICE — GLOVE BIOGEL PI MICRO SZ 6.0 48560

## (undated) DEVICE — STRAP KNEE/BODY 31143004

## (undated) DEVICE — CATH TRAY FOLEY SURESTEP 16FR WDRAIN BAG STLK LATEX A300316A

## (undated) DEVICE — SU VICRYL 0 CT-1 36" J346H

## (undated) DEVICE — DRSG STERI STRIP 1/2X4" R1547

## (undated) DEVICE — ESU PENCIL W/SMOKE EVAC NEPTUNE STRYKER 0703-046-000

## (undated) DEVICE — BNDG ABDOMINAL BINDER 9X45-62" 79-89071

## (undated) DEVICE — SOL NACL 0.9% IRRIG 1000ML BOTTLE 2F7124

## (undated) DEVICE — SU MONOCRYL 0 CTB-1 36" YB946

## (undated) DEVICE — SOL WATER IRRIG 1000ML BOTTLE 2F7114

## (undated) DEVICE — ESU GROUND PAD UNIVERSAL W/O CORD

## (undated) DEVICE — PREP CHLORAPREP 26ML TINTED HI-LITE ORANGE 930815

## (undated) DEVICE — PACK C-SECTION LF PL15OTA83B

## (undated) RX ORDER — CITRIC ACID/SODIUM CITRATE 334-500MG
SOLUTION, ORAL ORAL
Status: DISPENSED
Start: 2022-11-16

## (undated) RX ORDER — METHYLERGONOVINE MALEATE 0.2 MG/ML
INJECTION INTRAVENOUS
Status: DISPENSED
Start: 2022-11-16

## (undated) RX ORDER — LIDOCAINE HYDROCHLORIDE 10 MG/ML
INJECTION, SOLUTION EPIDURAL; INFILTRATION; INTRACAUDAL; PERINEURAL
Status: DISPENSED
Start: 2022-11-16

## (undated) RX ORDER — MORPHINE SULFATE 2 MG/ML
INJECTION, SOLUTION INTRAMUSCULAR; INTRAVENOUS
Status: DISPENSED
Start: 2022-11-16

## (undated) RX ORDER — FENTANYL CITRATE 50 UG/ML
INJECTION, SOLUTION INTRAMUSCULAR; INTRAVENOUS
Status: DISPENSED
Start: 2022-11-16

## (undated) RX ORDER — OXYTOCIN/0.9 % SODIUM CHLORIDE 30/500 ML
PLASTIC BAG, INJECTION (ML) INTRAVENOUS
Status: DISPENSED
Start: 2022-11-16

## (undated) RX ORDER — FENTANYL CITRATE-0.9 % NACL/PF 10 MCG/ML
PLASTIC BAG, INJECTION (ML) INTRAVENOUS
Status: DISPENSED
Start: 2022-11-16

## (undated) RX ORDER — PHENYLEPHRINE HCL IN 0.9% NACL 50MG/250ML
PLASTIC BAG, INJECTION (ML) INTRAVENOUS
Status: DISPENSED
Start: 2022-11-16

## (undated) RX ORDER — ACETAMINOPHEN 325 MG/1
TABLET ORAL
Status: DISPENSED
Start: 2022-11-16

## (undated) RX ORDER — HEPARIN SODIUM 1000 [USP'U]/ML
INJECTION, SOLUTION INTRAVENOUS; SUBCUTANEOUS
Status: DISPENSED
Start: 2022-11-16

## (undated) RX ORDER — MORPHINE SULFATE 1 MG/ML
INJECTION, SOLUTION EPIDURAL; INTRATHECAL; INTRAVENOUS
Status: DISPENSED
Start: 2022-11-16

## (undated) RX ORDER — CARBOPROST TROMETHAMINE 250 UG/ML
INJECTION, SOLUTION INTRAMUSCULAR
Status: DISPENSED
Start: 2022-11-16

## (undated) RX ORDER — ONDANSETRON 2 MG/ML
INJECTION INTRAMUSCULAR; INTRAVENOUS
Status: DISPENSED
Start: 2022-11-16

## (undated) RX ORDER — CEFAZOLIN SODIUM/WATER 2 G/20 ML
SYRINGE (ML) INTRAVENOUS
Status: DISPENSED
Start: 2022-11-16